# Patient Record
Sex: FEMALE | Race: OTHER | HISPANIC OR LATINO | Employment: FULL TIME | URBAN - METROPOLITAN AREA
[De-identification: names, ages, dates, MRNs, and addresses within clinical notes are randomized per-mention and may not be internally consistent; named-entity substitution may affect disease eponyms.]

---

## 2017-12-11 ENCOUNTER — ALLSCRIPTS OFFICE VISIT (OUTPATIENT)
Dept: OTHER | Facility: OTHER | Age: 32
End: 2017-12-11

## 2017-12-12 NOTE — CONSULTS
Assessment    1  GERD without esophagitis (530 81) (K21 9)   2  Gastritis (535 50) (K29 70)   3  Hematemesis (578 0) (K92 0)    Plan  Gastritis, GERD without esophagitis    · Follow Up After Tests Complete Evaluation and Treatment  Follow-up  Status: Hold For -Scheduling  Requested for: [id number]   Ordered; For: Gastritis, GERD without esophagitis; Ordered By: Max Mendez Performed:  Due: [id number]   · EGD; Status:Hold For - Scheduling; Requested for:[id number];    Perform:[facility]; Order Comments:west; SWR:[id number];LBYVCVF;SQFL without esophagitis; Ordered By:Laurie Pickard;    Discussion/Summary  Discussion Summary:   She appears to have worsening of her reflux symptoms  I will schedule her for an upper endoscopy to evaluate for reflux esophagitis, Santana's esophagus, and hiatal hernia  Given her history of gastritis in her possible history of bleeding also evaluate for peptic ulcer disease and Helicobacter pylori infection  I spoke to her about the benefits and risks of the procedure as well as instructions and answered all of her questions  I have asked her to restart the lansoprazole but to hold off on the sucralfate and metoclopramide for now  Chief Complaint  Chief Complaint Free Text Note Form: Patient is here for consult for GERD  History of Present Illness  HPI: She presents for evaluation because of gastroesophageal reflux disease  Her reflux symptoms began about 10 years ago and she had an upper endoscopy and was diagnosed with reflux and gastritis  Her symptoms had improved but then recently she had a worsening of her symptoms and was started on omeprazole  She developed headaches so she stopped taking the omeprazole  She was then started on lansoprazole, sucralfate, and metoclopramide  She felt this helped but then stopped taking the medications when her prescription ran out  She denies any dysphagia, change in bowel habits, bleeding, or weight loss   She complains of mild chronic constipation  She did have one episode of vomiting and noted some red blood in the emesis  History Reviewed: The history was obtained today from the patient and I agree with the documented history  Review of Systems  Complete-Female GI Adult:  Constitutional: No fever, no chills, feels well, no tiredness, no recent weight gain or weight loss  Eyes: No complaints of eye pain, no red eyes, no eyesight problems, no discharge, no dry eyes, no itching of eyes  ENT: no complaints of earache, no loss of hearing, no nose bleeds, no nasal discharge, no sore throat, no hoarseness  Cardiovascular: No complaints of slow heart rate, no fast heart rate, no chest pain, no palpitations, no leg claudication, no lower extremity edema  Respiratory: No complaints of shortness of breath, no wheezing, no cough, no SOB on exertion, no orthopnea, no PND  Gastrointestinal: GERD  Genitourinary: No complaints of dysuria, no incontinence, no pelvic pain, no dysmenorrhea, no vaginal discharge or bleeding  Musculoskeletal: No complaints of arthralgias, no myalgias, no joint swelling or stiffness, no limb pain or swelling  Integumentary: No complaints of skin rash or lesions, no itching, no skin wounds, no breast pain or lump  Neurological: No complaints of headache, no confusion, no convulsions, no numbness, no dizziness or fainting, no tingling, no limb weakness, no difficulty walking  Psychiatric: Not suicidal, no sleep disturbance, no anxiety or depression, no change in personality, no emotional problems  Endocrine: No complaints of proptosis, no hot flashes, no muscle weakness, no deepening of the voice, no feelings of weakness  Hematologic/Lymphatic: No complaints of swollen glands, no swollen glands in the neck, does not bleed easily, does not bruise easily  ROS Reviewed:   ROS reviewed  Past Medical History  Active Problems And Past Medical History Reviewed:    The active problems and past medical history were reviewed and updated today  Surgical History    1  History of Ankle Surgery   2  History of Tonsillectomy  Surgical History Reviewed: The surgical history was reviewed and updated today  Family History  Other    1  No pertinent family history  Family History Reviewed: The family history was reviewed and updated today  Social History  Social History Reviewed: The social history was reviewed and updated today  Current Meds   1  Allegra CAPS; Therapy: (Recorded:[id number]) to Recorded   2  Multiple Vitamin TABS; Therapy: (Recorded:90Adw6466) to Recorded  Medication List Reviewed: The medication list was reviewed and updated today  Allergies  1  No Known Drug Allergies    Vitals  Vital Signs    Recorded: 49QXM2373 10:11AM   Temperature 98 7 F, Tympanic   Heart Rate 88   Respiration 18   Systolic 194, LUE, Sitting   Diastolic 76, LUE, Sitting   Height 5 ft 7 in   Weight 200 lb    BMI Calculated 31 32   BSA Calculated 2 02       Physical Exam   Constitutional  General appearance: No acute distress, well appearing and well nourished  Eyes  Conjunctiva and lids: No swelling, erythema or discharge  Pupils and irises: Equal, round and reactive to light  Ears, Nose, Mouth, and Throat  External inspection of ears and nose: Normal    Nasal mucosa, septum, and turbinates: Normal without edema or erythema  Oropharynx: Normal with no erythema, edema, exudate or lesions  Pulmonary  Respiratory effort: No increased work of breathing or signs of respiratory distress  Auscultation of lungs: Clear to auscultation  Cardiovascular  Auscultation of heart: Normal rate and rhythm, normal S1 and S2, without murmurs  Examination of extremities for edema and/or varicosities: Normal    Abdomen  Abdomen: Abnormal  -- mild epigastric tenderness  Liver and spleen: No hepatomegaly or splenomegaly  Lymphatic  Palpation of lymph nodes in neck: No lymphadenopathy     Musculoskeletal  Gait and station: Normal    Digits and nails: Normal without clubbing or cyanosis  Inspection/palpation of joints, bones, and muscles: Normal    Skin  Skin and subcutaneous tissue: Normal without rashes or lesions     Psychiatric  Orientation to person, place, and time: Normal    Mood and affect: Normal          Signatures   Electronically signed by : Charo Posada MD; Dec 11 2017 10:48AM EST                       (Author)

## 2018-01-19 RX ORDER — DIPHENOXYLATE HYDROCHLORIDE AND ATROPINE SULFATE 2.5; .025 MG/1; MG/1
1 TABLET ORAL DAILY
COMMUNITY
End: 2020-08-24 | Stop reason: ALTCHOICE

## 2018-01-22 VITALS
RESPIRATION RATE: 18 BRPM | HEIGHT: 67 IN | TEMPERATURE: 98.7 F | SYSTOLIC BLOOD PRESSURE: 113 MMHG | DIASTOLIC BLOOD PRESSURE: 76 MMHG | WEIGHT: 200 LBS | HEART RATE: 88 BPM | BODY MASS INDEX: 31.39 KG/M2

## 2018-01-23 NOTE — CONSULTS
Chief Complaint  Patient is here for consult for GERD  History of Present Illness  She presents for evaluation because of gastroesophageal reflux disease  Her reflux symptoms began about 10 years ago and she had an upper endoscopy and was diagnosed with reflux and gastritis  Her symptoms had improved but then recently she had a worsening of her symptoms and was started on omeprazole  She developed headaches so she stopped taking the omeprazole  She was then started on lansoprazole, sucralfate, and metoclopramide  She felt this helped but then stopped taking the medications when her prescription ran out  She denies any dysphagia, change in bowel habits, bleeding, or weight loss  She complains of mild chronic constipation  She did have one episode of vomiting and noted some red blood in the emesis  The history was obtained today from the patient and I agree with the documented history  Review of Systems    Constitutional: No fever, no chills, feels well, no tiredness, no recent weight gain or weight loss  Eyes: No complaints of eye pain, no red eyes, no eyesight problems, no discharge, no dry eyes, no itching of eyes  ENT: no complaints of earache, no loss of hearing, no nose bleeds, no nasal discharge, no sore throat, no hoarseness  Cardiovascular: No complaints of slow heart rate, no fast heart rate, no chest pain, no palpitations, no leg claudication, no lower extremity edema  Respiratory: No complaints of shortness of breath, no wheezing, no cough, no SOB on exertion, no orthopnea, no PND  Gastrointestinal: GERD  Genitourinary: No complaints of dysuria, no incontinence, no pelvic pain, no dysmenorrhea, no vaginal discharge or bleeding  Musculoskeletal: No complaints of arthralgias, no myalgias, no joint swelling or stiffness, no limb pain or swelling  Integumentary: No complaints of skin rash or lesions, no itching, no skin wounds, no breast pain or lump     Neurological: No complaints of headache, no confusion, no convulsions, no numbness, no dizziness or fainting, no tingling, no limb weakness, no difficulty walking  Psychiatric: Not suicidal, no sleep disturbance, no anxiety or depression, no change in personality, no emotional problems  Endocrine: No complaints of proptosis, no hot flashes, no muscle weakness, no deepening of the voice, no feelings of weakness  Hematologic/Lymphatic: No complaints of swollen glands, no swollen glands in the neck, does not bleed easily, does not bruise easily  ROS reviewed  Past Medical History    The active problems and past medical history were reviewed and updated today  Surgical History    · History of Ankle Surgery   · History of Tonsillectomy    The surgical history was reviewed and updated today  Family History    · No pertinent family history    The family history was reviewed and updated today  Social History  The social history was reviewed and updated today  Current Meds   1  Allegra CAPS; Therapy: (Recorded:78Hka1863) to Recorded   2  Multiple Vitamin TABS; Therapy: (Recorded:54Sjk7530) to Recorded    The medication list was reviewed and updated today  Allergies    1  No Known Drug Allergies    Vitals   Recorded: 88GRZ9411 10:11AM   Temperature 98 7 F, Tympanic   Heart Rate 88   Respiration 18   Systolic 721, LUE, Sitting   Diastolic 76, LUE, Sitting   Height 5 ft 7 in   Weight 200 lb    BMI Calculated 31 32   BSA Calculated 2 02     Physical Exam    Constitutional   General appearance: No acute distress, well appearing and well nourished  Eyes   Conjunctiva and lids: No swelling, erythema or discharge  Pupils and irises: Equal, round and reactive to light  Ears, Nose, Mouth, and Throat   External inspection of ears and nose: Normal     Nasal mucosa, septum, and turbinates: Normal without edema or erythema  Oropharynx: Normal with no erythema, edema, exudate or lesions      Pulmonary Respiratory effort: No increased work of breathing or signs of respiratory distress  Auscultation of lungs: Clear to auscultation  Cardiovascular   Auscultation of heart: Normal rate and rhythm, normal S1 and S2, without murmurs  Examination of extremities for edema and/or varicosities: Normal     Abdomen   Abdomen: Abnormal   mild epigastric tenderness  Liver and spleen: No hepatomegaly or splenomegaly  Lymphatic   Palpation of lymph nodes in neck: No lymphadenopathy  Musculoskeletal   Gait and station: Normal     Digits and nails: Normal without clubbing or cyanosis  Inspection/palpation of joints, bones, and muscles: Normal     Skin   Skin and subcutaneous tissue: Normal without rashes or lesions  Psychiatric   Orientation to person, place, and time: Normal     Mood and affect: Normal          Assessment    1  GERD without esophagitis (530 81) (K21 9)   2  Gastritis (535 50) (K29 70)   3  Hematemesis (578 0) (K92 0)    Plan  Gastritis, GERD without esophagitis    · Follow Up After Tests Complete Evaluation and Treatment  Follow-up  Status: Hold For -  Scheduling  Requested for: [id number]   Ordered; For: Gastritis, GERD without esophagitis; Ordered By: Alex Ojeda Performed:  Due: 12UCW4983   · EGD; Status:Hold For - Scheduling; Requested for:04Hey1098;    Perform:[facility]; Order Comments:west; WOZ:[id number]; For:Gastritis, GERD without esophagitis; Ordered By:Gabby Pickard Seat;    Discussion/Summary    She appears to have worsening of her reflux symptoms  I will schedule her for an upper endoscopy to evaluate for reflux esophagitis, Santana's esophagus, and hiatal hernia  Given her history of gastritis in her possible history of bleeding also evaluate for peptic ulcer disease and Helicobacter pylori infection  I spoke to her about the benefits and risks of the procedure as well as instructions and answered all of her questions   I have asked her to restart the lansoprazole but to hold off on the sucralfate and metoclopramide for now        Signatures   Electronically signed by : Ruby Stern MD; Dec 11 2017 10:48AM EST                       (Author)

## 2018-01-24 ENCOUNTER — ANESTHESIA EVENT (OUTPATIENT)
Dept: GASTROENTEROLOGY | Facility: MEDICAL CENTER | Age: 33
End: 2018-01-24
Payer: COMMERCIAL

## 2018-01-29 ENCOUNTER — ANESTHESIA (OUTPATIENT)
Dept: GASTROENTEROLOGY | Facility: MEDICAL CENTER | Age: 33
End: 2018-01-29
Payer: COMMERCIAL

## 2018-01-29 ENCOUNTER — HOSPITAL ENCOUNTER (OUTPATIENT)
Facility: MEDICAL CENTER | Age: 33
Setting detail: OUTPATIENT SURGERY
Discharge: HOME | End: 2018-01-29
Attending: INTERNAL MEDICINE | Admitting: INTERNAL MEDICINE
Payer: COMMERCIAL

## 2018-01-29 VITALS
HEIGHT: 66 IN | OXYGEN SATURATION: 100 % | SYSTOLIC BLOOD PRESSURE: 100 MMHG | WEIGHT: 190 LBS | BODY MASS INDEX: 30.53 KG/M2 | HEART RATE: 64 BPM | DIASTOLIC BLOOD PRESSURE: 60 MMHG | TEMPERATURE: 98.6 F | RESPIRATION RATE: 16 BRPM

## 2018-01-29 DIAGNOSIS — K21.9 GASTRO-ESOPHAGEAL REFLUX DISEASE WITHOUT ESOPHAGITIS: ICD-10-CM

## 2018-01-29 LAB — EXT PREGNANCY TEST URINE: NEGATIVE

## 2018-01-29 PROCEDURE — 88305 TISSUE EXAM BY PATHOLOGIST: CPT | Performed by: INTERNAL MEDICINE

## 2018-01-29 PROCEDURE — 88342 IMHCHEM/IMCYTCHM 1ST ANTB: CPT | Performed by: PATHOLOGY

## 2018-01-29 PROCEDURE — 88313 SPECIAL STAINS GROUP 2: CPT | Performed by: INTERNAL MEDICINE

## 2018-01-29 PROCEDURE — 43239 EGD BIOPSY SINGLE/MULTIPLE: CPT | Performed by: INTERNAL MEDICINE

## 2018-01-29 PROCEDURE — 81025 URINE PREGNANCY TEST: CPT | Performed by: ANESTHESIOLOGY

## 2018-01-29 PROCEDURE — 88341 IMHCHEM/IMCYTCHM EA ADD ANTB: CPT | Performed by: INTERNAL MEDICINE

## 2018-01-29 PROCEDURE — 88313 SPECIAL STAINS GROUP 2: CPT | Performed by: PATHOLOGY

## 2018-01-29 PROCEDURE — 88342 IMHCHEM/IMCYTCHM 1ST ANTB: CPT | Performed by: INTERNAL MEDICINE

## 2018-01-29 PROCEDURE — 88305 TISSUE EXAM BY PATHOLOGIST: CPT | Performed by: PATHOLOGY

## 2018-01-29 RX ORDER — SODIUM CHLORIDE 9 MG/ML
125 INJECTION, SOLUTION INTRAVENOUS CONTINUOUS
Status: DISCONTINUED | OUTPATIENT
Start: 2018-01-29 | End: 2018-01-29 | Stop reason: HOSPADM

## 2018-01-29 RX ORDER — DEXTROAMPHETAMINE SACCHARATE, AMPHETAMINE ASPARTATE, DEXTROAMPHETAMINE SULFATE AND AMPHETAMINE SULFATE 2.5; 2.5; 2.5; 2.5 MG/1; MG/1; MG/1; MG/1
10 TABLET ORAL
COMMUNITY
End: 2020-08-24 | Stop reason: ALTCHOICE

## 2018-01-29 RX ADMIN — SODIUM CHLORIDE 125 ML/HR: 0.9 INJECTION, SOLUTION INTRAVENOUS at 12:00

## 2018-01-29 NOTE — OP NOTE
**** GI/ENDOSCOPY REPORT ****     PATIENT NAME: MARCY Deleon - VISIT ID:  Patient ID: [id number] YOB: 1985     INTRODUCTION: Esophagogastroduodenoscopy - A 29 female patient presents   for an outpatient Esophagogastroduodenoscopy at [address] [place] [address]  INDICATIONS: GERD  CONSENT: The benefits, risks, and alternatives to the procedure were   discussed and informed consent was obtained from the patient  PREPARATION:  EKG, pulse, pulse oximetry and blood pressure were monitored   throughout the procedure  ASA Classification: Class 2 - Patient has mild   to moderate systemic disturbance that may or may not be related to the   disorder requiring surgery  MEDICATIONS: Anesthesia-check records     PROCEDURE:  The endoscope was passed without difficulty through the mouth   under direct visualization and advanced to the 2nd portion of the   duodenum  The scope was withdrawn and the mucosa was carefully examined  Retroflexion was performed  FINDINGS:   Esophagus: The esophagus appeared to be normal    GE junction:   The GE junction appeared to be normal   Stomach: Erosive gastritis was   found in the stomach  Multiple biopsies was taken  Duodenum: The   duodenum appeared to be normal      COMPLICATIONS: There were no complications  IMPRESSIONS: Normal esophagus  Normal GE junction  Erosive gastritis   found  Multiple biopsies taken  Normal duodenum  RECOMMENDATIONS: Anti-reflux measures: Raise the head of the bed 4 to 6   inches  Avoid smoking  Avoid excess coffee, tea or other caffeinated   beverages  Avoid garments that fit tightly through the abdomen  Avoid   eating before bed  Continue current medications  Follow-up on the results   of the biopsy specimens  Follow-up appointment with endoscopist      ESTIMATED BLOOD LOSS:     PATHOLOGY SPECIMENS: Multiple biopsies taken  Associated finding:   Gastritis       PROCEDURE CODES:     ICD-9 Codes: 530 81 Esophageal reflux 535 40 Other specified gastritides,   without mention of hemorrhage     ICD-10 Codes: K21 Gastro-esophageal reflux disease K29 Gastritis and   duodenitis     PERFORMED BY: REINALDO Storm  on 01/29/2018  Version 1, electronically signed by REINALDO Cha  on 01/29/2018   at 13:06

## 2018-01-29 NOTE — DISCHARGE INSTRUCTIONS
Gastroesophageal Reflux Disease   WHAT YOU NEED TO KNOW:   What is gastroesophageal reflux disease? Gastroesophageal reflux occurs when acid and food in the stomach back up into the esophagus  Gastroesophageal reflux disease (GERD) is reflux that occurs more than twice a week for a few weeks  It usually causes heartburn and other symptoms  GERD can cause other health problems over time if it is not treated  What causes GERD? GERD often occurs when the lower muscle (sphincter) of the esophagus does not close properly  The sphincter normally opens to let food into the stomach  It then closes to keep food and stomach acid in the stomach  If the sphincter does not close properly, stomach acid and food back up (reflux) into the esophagus  The following may increase your risk for GERD:  · Certain foods such as spicy foods, chocolate, foods that contain caffeine, peppermint, and fried foods    · Hiatal hernia    · Certain medicines such as calcium channel blockers (used to treat high blood pressure), allergy medicines, sedatives, or antidepressants    · Pregnancy or obesity    · Lying down after a meal    · Drinking alcohol or smoking  What are the signs and symptoms of GERD? Heartburn is the most common symptom of GERD  You may feel burning pain in your chest or below the breast bone  This usually occurs after meals and spreads to your neck, jaw, or shoulder  The pain gets better when you change positions  You may also have any of the following:  · Bitter or acid taste in your mouth    · Dry cough    · Trouble swallowing or pain with swallowing    · Hoarseness or sore throat    · Frequent burping or hiccups    · Feeling of fullness soon after you start eating  How is GERD diagnosed? Your healthcare provider will ask about your symptoms and when they started  Tell him or her about other medical conditions you have, your eating habits, and your activities   You may also need any of the following:  · Esophageal pH monitoring  is used to place a small probe inside your esophagus and stomach to check the amount of acid  · An endoscopy  is a procedure used to look at the inside of your esophagus and stomach  An endoscope is a bendable tube with a light and camera on the end  Your healthcare provider may remove a small sample of tissue and send it to a lab for tests  · Upper GI x-rays  are done to take pictures of your stomach and intestines (bowel)  You may be given a chalky liquid to drink before the pictures are taken  This liquid helps your stomach and intestines show up better on the x-rays  · Esophageal manometry  is a test that shows how your esophagus pushes food and fluid to your stomach  It also shows the pressures in your esophagus and stomach  It may show a hiatal hernia  How is GERD treated? · Medicines  are used to decrease stomach acid  Medicine may also be used to help your lower esophageal sphincter and stomach contract (tighten) more  · Surgery  is done to wrap the upper part of the stomach around the esophageal sphincter  This will strengthen the sphincter and prevent reflux  How can I help manage GERD? · Do not have foods or drinks that may increase heartburn  These include chocolate, peppermint, fried or fatty foods, drinks that contain caffeine, or carbonated drinks (soda)  Other foods include spicy foods, onions, tomatoes, and tomato-based foods  Do not have foods or drinks that can irritate your esophagus, such as citrus fruits, juices, and alcohol  · Do not eat large meals  When you eat a lot of food at one time, your stomach needs more acid to digest it  Eat 6 small meals each day instead of 3 large ones, and eat slowly  Do not eat meals 2 to 3 hours before bedtime  · Elevate the head of your bed  Place 6-inch blocks under the head of your bed frame  You may also use more than one pillow under your head and shoulders while you sleep  · Maintain a healthy weight    If you are overweight, weight loss may help relieve symptoms of GERD  · Do not smoke  Smoking weakens the lower esophageal sphincter and increases the risk of GERD  Ask your healthcare provider for information if you currently smoke and need help to quit  E-cigarettes or smokeless tobacco still contain nicotine  Talk to your healthcare provider before you use these products  · Do not wear clothing that is tight around your waist   Tight clothing can put pressure on your stomach and cause or worsen GERD symptoms  When should I seek immediate care? · You feel full and cannot burp or vomit  · You have severe chest pain and sudden trouble breathing  · Your bowel movements are black, bloody, or tarry-looking  · Your vomit looks like coffee grounds or has blood in it  When should I contact my healthcare provider? · You vomit large amounts, or you vomit often  · You have trouble breathing after you vomit  · You have trouble swallowing, or pain with swallowing  · You are losing weight without trying  · Your symptoms get worse or do not improve with treatment  · You have questions or concerns about your condition or care  CARE AGREEMENT:   You have the right to help plan your care  Learn about your health condition and how it may be treated  Discuss treatment options with your caregivers to decide what care you want to receive  You always have the right to refuse treatment  The above information is an  only  It is not intended as medical advice for individual conditions or treatments  Talk to your doctor, nurse or pharmacist before following any medical regimen to see if it is safe and effective for you  © 2017 [address] Information is for End User's use only and may not be sold, redistributed or otherwise used for commercial purposes   All illustrations and images included in CareNotes® are the copyrighted property of [employer] M [employer]  or [employer]  Upper Endoscopy   WHAT YOU NEED TO KNOW:   An upper endoscopy is also called an upper gastrointestinal (GI) endoscopy, or an esophagogastroduodenoscopy (EGD)  You may feel bloated, gassy, or have some abdominal discomfort after your procedure  Your throat may be sore for 24 to 36 hours  You may burp or pass gas from air that is still inside your body  DISCHARGE INSTRUCTIONS:   Call 911 for any of the following:   · You have sudden chest pain or trouble breathing  Seek care immediately if:   · You feel dizzy or faint  · You have trouble swallowing  · Your bowel movements are very dark or black  · Your abdomen is hard and firm and you have severe pain  · You vomit blood  Contact your healthcare provider if:   · You feel full or bloated and cannot burp or pass gas  · You have not had a bowel movement for 3 days after your procedure  · You have neck pain  · You have a fever or chills  · You have nausea or are vomiting  · You have a rash or hives  · You have questions or concerns about your endoscopy  Relieve a sore throat:  Suck on throat lozenges or crushed ice  Gargle with a small amount of warm salt water  Mix 1 teaspoon of salt and 1 cup of warm water to make salt water  Relieve gas and discomfort from bloating:  Lie on your right side with a heating pad on your abdomen  Take short walks to help pass gas  Eat small meals until bloating is relieved  Rest after your procedure: You have been given medicine to relax you  Do not  drive or make important decisions until the day after your procedure  Return to your normal activity as directed  You can usually return to work the day after your procedure  Follow up with your healthcare provider as directed:  Write down your questions so you remember to ask them during your visits     © 2017 [address] is for End User's use only and may not be sold, redistributed or otherwise used for commercial purposes  All illustrations and images included in CareNotes® are the copyrighted property of [employer]  or [employer]  The above information is an  only  It is not intended as medical advice for individual conditions or treatments  Talk to your doctor, nurse or pharmacist before following any medical regimen to see if it is safe and effective for you  Helicobacter Pylori   WHAT YOU NEED TO KNOW:   What is Helicobacter pylori (H  pylori)? H  pylori is a type of bacteria that causes an infection in the lining of the stomach and upper intestine  People are usually infected with the bacteria as children, but may not have symptoms until they are adults  What increases my risk for an H  pylori infection? Experts do not know exactly how H  pylori spread  The following may increase your risk for an infection:  · You eat food that is not washed well or cooked properly  · You drink water that is not clean  · You have contact with bowel movement, vomit, or saliva that is infected with H  pylori  What are the signs and symptoms of an H  pylori infection? Most people who are infected with H  pylori never have symptoms  If you do, your symptoms may come and go and last for minutes or hours  You may feel better for a short time after you eat food or take medicine  You may have any of the following:  · Dull or burning pain in your stomach or throat    · Nausea, vomiting, bloating, or burping    · Loss of appetite or weight loss    · Pain at night or with an empty stomach  How is an H  pylori infection diagnosed? · A urea breath test  may be used to test for H  pylori  You will swallow pudding, liquid, or a capsule that contains a chemical  Then you will breathe into a container  Your breath sample will be tested for a reaction to the chemical that confirms H  pylori infection  · A bowel movement sample  may be sent to a lab to test for infection      · Blood tests may be used to test for infection  · Endoscopy  is a procedure that uses a scope to see the inside of your stomach  A scope is a soft, flexible tube with a light and tiny camera on the end  It is passed down your throat and into your stomach  Samples of your stomach tissue may be removed and sent to a lab to test for H  pylori infection  This test will be done if your healthcare provider thinks you may have an ulcer  How is an H  pylori infection treated? · Antibiotics  help kill the bacteria  You may need to take this medicine for 10 to 14 days  Your healthcare provider will prescribe at least 2 antibiotics at the same time  · Antiulcer medicines  help decrease the amount of acid that is normally made by the stomach  These help relieve pain and heal or prevent ulcers  · Bismuth  is a liquid or tablet that may be used to decrease heartburn, upset stomach, or diarrhea  It may also decrease swelling in your stomach and help kill the infection if other medicines do not work  It also protects ulcers from stomach acid so they can heal   What are the risks of an H  pylori infection? You may need more than one course of treatment to kill the H  pylori infection  Your symptoms may return after treatment  You are at risk for a peptic ulcer in the lining of your stomach and intestines if you do not receive treatment for your infection  H  pylori may also cause swelling in your stomach  The infection may lead to stomach cancer and lymphoma (cancer of the lymph nodes) if it is not treated  How can I help prevent an H  pylori infection? · Wash your hands well with soap and warm water  Clean your hands before you eat and after you use the bathroom  · Handle food properly  Rinse food well before you cook or eat it  [place] food all the way through  Proper handling will help kill any bacteria that may be on your food  · Drink clean water from a safe source    Only drink water that has been filtered or purified  · Ask about NSAIDs  NSAIDs can cause stomach bleeding and kidney problems if not taken correctly  Your healthcare provider may tell you to avoid these medicines because they can make your symptoms worse  · Do not smoke  Nicotine and other chemicals in cigarettes and cigars can worsen your symptoms and also cause lung damage  Ask your healthcare provider for information if you currently smoke and need help to quit  E-cigarettes or smokeless tobacco still contain nicotine  Talk to your healthcare provider before you use these products  · Do not drink alcohol  Alcohol may worsen your symptoms of heartburn  When should I seek immediate care? · You have bloody bowel movements, bloody vomit, or vomit that looks like coffee grounds  · You have sudden, sharp stomach pain that does not go away or spreads to your back  When should I contact my healthcare provider? · Your symptoms do not improve with treatment  · You feel full after eating only a small amount of food  · You lose weight without trying  · You have questions or concerns about your condition or care  CARE AGREEMENT:   You have the right to help plan your care  Learn about your health condition and how it may be treated  Discuss treatment options with your caregivers to decide what care you want to receive  You always have the right to refuse treatment  The above information is an  only  It is not intended as medical advice for individual conditions or treatments  Talk to your doctor, nurse or pharmacist before following any medical regimen to see if it is safe and effective for you  © 2017 [address] Information is for End User's use only and may not be sold, redistributed or otherwise used for commercial purposes  All illustrations and images included in CareNotes® are the copyrighted property of [employer]  or [employer] Garcia

## 2018-01-29 NOTE — ANESTHESIA PREPROCEDURE EVALUATION
Review of Systems/Medical History  Patient summary reviewed  Chart reviewed      Cardiovascular  Negative cardio ROS Exercise tolerance: good,     Pulmonary  Negative pulmonary ROS        GI/Hepatic  Dysphagia,          Negative  ROS        Endo/Other  Negative endo/other ROS      GYN  Negative gynecology ROS          Hematology  Negative hematology ROS      Musculoskeletal  Negative musculoskeletal ROS        Neurology  Negative neurology ROS      Psychology   Negative psychology ROS              Physical Exam    Airway    Mallampati score: I  TM Distance: >3 FB  Neck ROM: full     Dental       Cardiovascular  Comment: Negative ROS, Cardiovascular exam normal    Pulmonary  Pulmonary exam normal     Other Findings        Anesthesia Plan  ASA Score- 1     Anesthesia Type- IV sedation with anesthesia with ASA Monitors  Additional Monitors:   Airway Plan:         Plan Factors- Patient instructed to abstain from smoking on day of procedure  Patient did not smoke on day of surgery  Induction- intravenous  Postoperative Plan-     Informed Consent- Anesthetic plan and risks discussed with patient

## 2018-02-03 DIAGNOSIS — A04.8 HELICOBACTER PYLORI (H. PYLORI) INFECTION: Primary | ICD-10-CM

## 2018-02-03 RX ORDER — AMOXICILLIN 500 MG/1
1000 CAPSULE ORAL EVERY 12 HOURS SCHEDULED
Qty: 56 CAPSULE | Refills: 0 | Status: SHIPPED | OUTPATIENT
Start: 2018-02-03 | End: 2018-02-17

## 2018-02-03 RX ORDER — LANSOPRAZOLE 30 MG/1
30 CAPSULE, DELAYED RELEASE ORAL
Qty: 28 CAPSULE | Refills: 0 | Status: SHIPPED | OUTPATIENT
Start: 2018-02-03 | End: 2018-04-10

## 2018-02-03 RX ORDER — CLARITHROMYCIN 500 MG/1
500 TABLET, COATED ORAL EVERY 12 HOURS SCHEDULED
Qty: 28 TABLET | Refills: 0 | Status: SHIPPED | OUTPATIENT
Start: 2018-02-03 | End: 2018-02-17

## 2018-02-03 RX ORDER — LANSOPRAZOLE, AMOXICILLIN, CLARITHROMYCIN 30-500-500
KIT ORAL 2 TIMES DAILY
Qty: 1 KIT | Refills: 0 | Status: SHIPPED | OUTPATIENT
Start: 2018-02-03 | End: 2018-02-03

## 2018-02-16 ENCOUNTER — TELEPHONE (OUTPATIENT)
Dept: GASTROENTEROLOGY | Facility: MEDICAL CENTER | Age: 33
End: 2018-02-16

## 2018-02-16 DIAGNOSIS — A04.8 HELICOBACTER PYLORI (H. PYLORI) INFECTION: ICD-10-CM

## 2018-02-16 RX ORDER — LANSOPRAZOLE 30 MG/1
30 CAPSULE, DELAYED RELEASE ORAL
Qty: 28 CAPSULE | Refills: 0 | Status: CANCELLED | OUTPATIENT
Start: 2018-02-16 | End: 2018-03-02

## 2018-02-16 RX ORDER — LANSOPRAZOLE 30 MG/1
30 CAPSULE, DELAYED RELEASE ORAL
COMMUNITY
End: 2018-02-16

## 2018-02-16 NOTE — TELEPHONE ENCOUNTER
Dr Deana Van pt called stating she will finishing her antibiotics tomorrown and also her Lansoprazole  Pt states she is still having the same symptoms  Pt is sched for office visit with PA on 02/28/18 and wanted to know if she can get a refill on her Lansoprazole   Pt can

## 2018-04-10 ENCOUNTER — OFFICE VISIT (OUTPATIENT)
Dept: GASTROENTEROLOGY | Facility: MEDICAL CENTER | Age: 33
End: 2018-04-10
Payer: COMMERCIAL

## 2018-04-10 VITALS
HEART RATE: 89 BPM | WEIGHT: 190 LBS | TEMPERATURE: 96.8 F | BODY MASS INDEX: 30.67 KG/M2 | SYSTOLIC BLOOD PRESSURE: 108 MMHG | DIASTOLIC BLOOD PRESSURE: 72 MMHG

## 2018-04-10 DIAGNOSIS — K29.70 HELICOBACTER POSITIVE GASTRITIS: Primary | ICD-10-CM

## 2018-04-10 DIAGNOSIS — B96.81 HELICOBACTER POSITIVE GASTRITIS: Primary | ICD-10-CM

## 2018-04-10 DIAGNOSIS — K21.9 GERD WITHOUT ESOPHAGITIS: ICD-10-CM

## 2018-04-10 PROCEDURE — 99214 OFFICE O/P EST MOD 30 MIN: CPT | Performed by: INTERNAL MEDICINE

## 2018-04-10 RX ORDER — OMEPRAZOLE 40 MG/1
CAPSULE, DELAYED RELEASE ORAL
Refills: 11 | COMMUNITY
Start: 2018-03-28 | End: 2018-04-10 | Stop reason: ALTCHOICE

## 2018-04-10 RX ORDER — LANSOPRAZOLE 30 MG/1
30 CAPSULE, DELAYED RELEASE ORAL
Qty: 30 CAPSULE | Refills: 5 | Status: SHIPPED | OUTPATIENT
Start: 2018-04-10 | End: 2018-12-19 | Stop reason: SDUPTHER

## 2018-04-10 NOTE — LETTER
April 10, 2018     Patient: Светлана Stephenson   YOB: 1985   Date of Visit: 4/10/2018       To Whom it May Concern:    Светлана Stpehenson is under my professional care  She was seen in my office on 4/10/2018  She may return to work on 4/11/2018  If you have any questions or concerns, please don't hesitate to call           Sincerely,          Shahla Kincaid MD        CC: No Recipients

## 2018-04-10 NOTE — LETTER
April 10, 2018     Kourtney Dyer MD  [address]  [address] [place] [address]    Patient: Charles Lindo   YOB: 1985   Date of Visit: 4/10/2018       Dear Dr Tamika Green: Thank you for referring Charles Lindo to me for evaluation  Below are my notes for this consultation  If you have questions, please do not hesitate to call me  I look forward to following your patient along with you  Sincerely,        Liane Parikh MD        CC: No Recipients  Liane Parikh MD  4/10/2018  2:24 PM  Sign at close encounter  [facility] - Outpatient Follow-up Note  Charles Lindo 29 y o  female MRN: [id number]  Encounter: [id number]          ASSESSMENT AND PLAN:      1  Helicobacter positive gastritis  I will check her Helicobacter pylori stool antigen to make sure her infection has been eradicated  I asked her to hold the lansoprazole for one week prior to the stool test   - lansoprazole (PREVACID) 30 mg capsule; Take 1 capsule (30 mg total) by mouth daily before breakfast for 30 days  Dispense: 30 capsule; Refill: 5  - H  pylori antigen, stool; Future    2  GERD without esophagitis  I have asked her to resume lansoprazole daily as the omeprazole is causing headaches  I also spoke to her about dietary and lifestyle modifications that can help her symptoms  She should call if she develops any alarm symptoms   - lansoprazole (PREVACID) 30 mg capsule; Take 1 capsule (30 mg total) by mouth daily before breakfast for 30 days  Dispense: 30 capsule; Refill: 5    ______________________________________________________________________    SUBJECTIVE:  She presents for follow-up after her recent upper endoscopy because of reflux and epigastric pain  She was diagnosed with gastritis and her biopsies were positive for Helicobacter pylori infection  I started her on triple therapy and she completed a two week course    She recently started taking omeprazole again but feels it is giving her headaches  She denies any dysphagia, and feels her reflux is well controlled with a proton pump inhibitor  REVIEW OF SYSTEMS IS OTHERWISE NEGATIVE  Historical Information   No past medical history on file  Past Surgical History:   Procedure Laterality Date    ANKLE SURGERY      MOUTH SURGERY      [place] ESOPHAGOGASTRODUODENOSCOPY TRANSORAL DIAGNOSTIC N/A 1/29/2018    Procedure: ESOPHAGOGASTRODUODENOSCOPY (EGD); Surgeon: Lupe Keane MD;  Location: [facility]; Service: Gastroenterology    TONSILLECTOMY       Social History   History   Alcohol Use    Yes     History   Drug Use No     History   Smoking Status    Never Smoker   Smokeless Tobacco    Former User     No family history on file  Meds/Allergies       Current Outpatient Prescriptions:     amphetamine-dextroamphetamine (ADDERALL) 10 mg tablet    Fexofenadine HCl (ALLEGRA PO)    multivitamin (THERAGRAN) TABS    lansoprazole (PREVACID) 30 mg capsule    No Known Allergies        Objective     Blood pressure 108/72, pulse 89, temperature (!) 96 8 °F (36 °C), temperature source Tympanic, weight 86 2 kg (190 lb), unknown if currently breastfeeding  Body mass index is 30 67 kg/m²  PHYSICAL EXAM:      General Appearance:   Alert, cooperative, no distress   HEENT:   Normocephalic, atraumatic, anicteric      Neck:  Supple, symmetrical, trachea midline   Lungs:   Clear to auscultation bilaterally; no rales, rhonchi or wheezing; respirations unlabored    Heart[de-identified]   Regular rate and rhythm; no murmur, rub, or gallop  Abdomen:   Soft, mild tenderness, non-distended; normal bowel sounds; no masses, no organomegaly    Genitalia:   Deferred    Rectal:   Deferred    Extremities:  No cyanosis, clubbing or edema    Pulses:  2+ and symmetric    Skin:  No jaundice, rashes, or lesions    Lymph nodes:  No palpable cervical lymphadenopathy        Lab Results:   No visits with results within 1 Day(s) from this visit     Latest known visit with results is:   Admission on 01/29/2018, Discharged on 01/29/2018   Component Date Value    EXT Preg Test, Ur 01/29/2018 Negative     Case Report 01/29/2018                      Value:Surgical Pathology Report                         Case: [id number]                                   Authorizing Provider:  Shawn Gimenez MD           Collected:           01/29/2018 1254              Ordering Location:     [facility]        Received:            01/29/2018 [facility] Endoscopy                                                     Pathologist:           Paul Navarrete MD                                                              Specimen:    Stomach, erosive gastritis, r/o h pylori                                                   Addendum 01/29/2018                      Value: This result contains rich text formatting which cannot be displayed here   Final Diagnosis 01/29/2018                      Value: This result contains rich text formatting which cannot be displayed here   Note 01/29/2018                      Value: This result contains rich text formatting which cannot be displayed here   Additional Information 01/29/2018                      Value: This result contains rich text formatting which cannot be displayed here  Faye Medrano Gross Description 01/29/2018                      Value: This result contains rich text formatting which cannot be displayed here  Radiology Results:   No results found

## 2018-04-10 NOTE — PROGRESS NOTES
[facility] - Outpatient Follow-up Note  Marielos Mccauley 29 y o  female MRN: [id number]  Encounter: [id number]          ASSESSMENT AND PLAN:      1  Helicobacter positive gastritis  I will check her Helicobacter pylori stool antigen to make sure her infection has been eradicated  I asked her to hold the lansoprazole for one week prior to the stool test   - lansoprazole (PREVACID) 30 mg capsule; Take 1 capsule (30 mg total) by mouth daily before breakfast for 30 days  Dispense: 30 capsule; Refill: 5  - H  pylori antigen, stool; Future    2  GERD without esophagitis  I have asked her to resume lansoprazole daily as the omeprazole is causing headaches  I also spoke to her about dietary and lifestyle modifications that can help her symptoms  She should call if she develops any alarm symptoms   - lansoprazole (PREVACID) 30 mg capsule; Take 1 capsule (30 mg total) by mouth daily before breakfast for 30 days  Dispense: 30 capsule; Refill: 5    ______________________________________________________________________    SUBJECTIVE:  She presents for follow-up after her recent upper endoscopy because of reflux and epigastric pain  She was diagnosed with gastritis and her biopsies were positive for Helicobacter pylori infection  I started her on triple therapy and she completed a two week course  She recently started taking omeprazole again but feels it is giving her headaches  She denies any dysphagia, and feels her reflux is well controlled with a proton pump inhibitor  REVIEW OF SYSTEMS IS OTHERWISE NEGATIVE  Historical Information   No past medical history on file  Past Surgical History:   Procedure Laterality Date    ANKLE SURGERY      MOUTH SURGERY      AR ESOPHAGOGASTRODUODENOSCOPY TRANSORAL DIAGNOSTIC N/A 1/29/2018    Procedure: ESOPHAGOGASTRODUODENOSCOPY (EGD); Surgeon: Daniela Zarate MD;  Location: [facility];   Service: Gastroenterology    TONSILLECTOMY       Social History   History   Alcohol Use    Yes     History   Drug Use No     History   Smoking Status    Never Smoker   Smokeless Tobacco    Former User     No family history on file  Meds/Allergies       Current Outpatient Prescriptions:     amphetamine-dextroamphetamine (ADDERALL) 10 mg tablet    Fexofenadine HCl (ALLEGRA PO)    multivitamin (THERAGRAN) TABS    lansoprazole (PREVACID) 30 mg capsule    No Known Allergies        Objective     Blood pressure 108/72, pulse 89, temperature (!) 96 8 °F (36 °C), temperature source Tympanic, weight 86 2 kg (190 lb), unknown if currently breastfeeding  Body mass index is 30 67 kg/m²  PHYSICAL EXAM:      General Appearance:   Alert, cooperative, no distress   HEENT:   Normocephalic, atraumatic, anicteric      Neck:  Supple, symmetrical, trachea midline   Lungs:   Clear to auscultation bilaterally; no rales, rhonchi or wheezing; respirations unlabored    Heart[de-identified]   Regular rate and rhythm; no murmur, rub, or gallop  Abdomen:   Soft, mild tenderness, non-distended; normal bowel sounds; no masses, no organomegaly    Genitalia:   Deferred    Rectal:   Deferred    Extremities:  No cyanosis, clubbing or edema    Pulses:  2+ and symmetric    Skin:  No jaundice, rashes, or lesions    Lymph nodes:  No palpable cervical lymphadenopathy        Lab Results:   No visits with results within 1 Day(s) from this visit     Latest known visit with results is:   Admission on 01/29/2018, Discharged on 01/29/2018   Component Date Value    EXT Preg Test, Ur 01/29/2018 Negative     Case Report 01/29/2018                      Value:Surgical Pathology Report                         Case: [id number]                                   Authorizing Provider:  Ankit Draper MD           Collected:           01/29/2018 1254              Ordering Location:     [address] [place] [address] End        Received:            01/29/2018 [facility] Pathologist:           Sheryl Hooper MD                                                              Specimen:    Stomach, erosive gastritis, r/o h pylori                                                   Addendum 01/29/2018                      Value: This result contains rich text formatting which cannot be displayed here   Final Diagnosis 01/29/2018                      Value: This result contains rich text formatting which cannot be displayed here   Note 01/29/2018                      Value: This result contains rich text formatting which cannot be displayed here   Additional Information 01/29/2018                      Value: This result contains rich text formatting which cannot be displayed here  Randall Ken Gross Description 01/29/2018                      Value: This result contains rich text formatting which cannot be displayed here  Radiology Results:   No results found

## 2018-04-20 ENCOUNTER — LAB (OUTPATIENT)
Dept: LAB | Facility: MEDICAL CENTER | Age: 33
End: 2018-04-20
Attending: INTERNAL MEDICINE
Payer: COMMERCIAL

## 2018-04-20 DIAGNOSIS — K29.70 HELICOBACTER POSITIVE GASTRITIS: ICD-10-CM

## 2018-04-20 DIAGNOSIS — B96.81 HELICOBACTER POSITIVE GASTRITIS: ICD-10-CM

## 2018-04-20 PROCEDURE — 87338 HPYLORI STOOL AG IA: CPT

## 2018-04-21 LAB — H PYLORI AG STL QL IA: NEGATIVE

## 2018-07-13 ENCOUNTER — OFFICE VISIT (OUTPATIENT)
Dept: OBGYN CLINIC | Facility: MEDICAL CENTER | Age: 33
End: 2018-07-13
Payer: COMMERCIAL

## 2018-07-13 VITALS
WEIGHT: 186.8 LBS | HEIGHT: 66 IN | DIASTOLIC BLOOD PRESSURE: 68 MMHG | SYSTOLIC BLOOD PRESSURE: 102 MMHG | BODY MASS INDEX: 30.02 KG/M2

## 2018-07-13 DIAGNOSIS — Z11.51 SCREENING FOR HPV (HUMAN PAPILLOMAVIRUS): ICD-10-CM

## 2018-07-13 DIAGNOSIS — Z01.419 ENCOUNTER FOR WELL WOMAN EXAM WITH ROUTINE GYNECOLOGICAL EXAM: ICD-10-CM

## 2018-07-13 DIAGNOSIS — Z01.419 ENCOUNTER FOR ROUTINE GYNECOLOGICAL EXAMINATION WITH PAPANICOLAOU SMEAR OF CERVIX: Primary | ICD-10-CM

## 2018-07-13 DIAGNOSIS — N93.9 ABNORMAL UTERINE BLEEDING (AUB): ICD-10-CM

## 2018-07-13 PROBLEM — Z97.5 IUD (INTRAUTERINE DEVICE) IN PLACE: Status: ACTIVE | Noted: 2018-07-13

## 2018-07-13 PROCEDURE — 87624 HPV HI-RISK TYP POOLED RSLT: CPT | Performed by: OBSTETRICS & GYNECOLOGY

## 2018-07-13 PROCEDURE — S0610 ANNUAL GYNECOLOGICAL EXAMINA: HCPCS | Performed by: OBSTETRICS & GYNECOLOGY

## 2018-07-13 PROCEDURE — G0145 SCR C/V CYTO,THINLAYER,RESCR: HCPCS | Performed by: OBSTETRICS & GYNECOLOGY

## 2018-07-13 NOTE — PROGRESS NOTES
ASSESSMENT & PLAN: Sallie Paula is a 29 y o  Teresa Matute with normal gynecologic exam     1   Routine well woman exam done today  2  Pap and HPV:  The patient's last pap and hpv was   It was normal in [facility]  Pap and cotesting was done today  Current ASCCP Guidelines reviewed  3   The following were reviewed in today's visit: breast self exam and mammography screening ordered  4  AUB, Mirena in place; states was told she has Fibroid in [facility] - check ultrasound     CC:  Annual Gynecologic Examination    HPI: Sallie Paula is a 29 y o  Teresa Matute who presents for annual gynecologic examination  She has the following concerns:  States had heavy bleeding after intercourse; has been using Mirena for 7 years; has not had intercourse since episode, was told she has Fibroid in [facility]  Health Maintenance:    She wears her seatbelt routinely  She does not perform regular monthly self breast exams  She feels safe at home  Past Medical History:   Diagnosis Date    Gastritis        Past Surgical History:   Procedure Laterality Date    ANKLE SURGERY      MOUTH SURGERY      [place] ESOPHAGOGASTRODUODENOSCOPY TRANSORAL DIAGNOSTIC N/A 2018    Procedure: ESOPHAGOGASTRODUODENOSCOPY (EGD); Surgeon: Shawn Gimenez MD;  Location: [facility] GI [facility]; Service: Gastroenterology    TONSILLECTOMY         Past OB/Gyn History:  OB History      Para Term  AB Living    0 0 0 0 0 0    SAB TAB Ectopic Multiple Live Births    0 0 0 0 0        Pt does not have menstrual issues  History of sexually transmitted infection: No   History of abnormal pap smears: No prior HPV +, states had laser  Patient is currently sexually active  heterosexual   The current method of family planning is IUD      Family History   Problem Relation Age of Onset    Hyperlipidemia Father     Hyperlipidemia Brother        Social History:  Social History     Social History    Marital status: /Civil Union     Spouse name: N/A  Number of children: N/A    Years of education: N/A     Occupational History    Not on file  Social History Main Topics    Smoking status: Never Smoker    Smokeless tobacco: Never Used    Alcohol use Yes      Comment: social    Drug use: No    Sexual activity: Yes     Partners: Male     Birth control/ protection: IUD     Other Topics Concern    Not on file     Social History Narrative    No narrative on file       No Known Allergies      Current Outpatient Prescriptions:     amphetamine-dextroamphetamine (ADDERALL) 10 mg tablet, Take 10 mg by mouth 2 (two) times a day, Disp: , Rfl:     Fexofenadine HCl (ALLEGRA PO), Take by mouth, Disp: , Rfl:     levonorgestrel (MIRENA) 20 MCG/24HR IUD, , Disp: , Rfl:     multivitamin (THERAGRAN) TABS, Take 1 tablet by mouth daily, Disp: , Rfl:     lansoprazole (PREVACID) 30 mg capsule, Take 1 capsule (30 mg total) by mouth daily before breakfast for 30 days, Disp: 30 capsule, Rfl: 5      Review of Systems  Constitutional :no fever, feels well, no tiredness, no recent weight gain or loss  ENT: no ear ache, no loss of hearing, no nosebleeds or nasal discharge, no sore throat or hoarseness  Cardiovascular: no complaints of slow or fast heart beat, no chest pain, no palpitations, no leg claudication or lower extremity edema  Respiratory: no complaints of shortness of shortness of breath, no MORTENSEN  Breasts:no complaints of breast pain, breast lump, or nipple discharge  Gastrointestinal: no complaints of abdominal pain, constipation, nausea, vomiting, or diarrhea or bloody stools  Genitourinary : no complaints of dysuria, incontinence, pelvic pain, no dysmenorrhea, vaginal discharge or abnormal vaginal bleeding and as noted in HPI  Musculoskeletal: no complaints of arthralgia, no myalgia, no joint swelling or stiffness, no limb pain or swelling    Integumentary: no complaints of skin rash or lesion, itching or dry skin  Neurological: no complaints of headache, no confusion, no numbness or tingling, no dizziness or fainting    Objective      /68 (BP Location: Left arm, Patient Position: Sitting, Cuff Size: Adult)   Ht 5' 5 75" (1 67 m)   Wt 84 7 kg (186 lb 12 8 oz)   BMI 30 38 kg/m²   General:   appears stated age, cooperative, alert normal mood and affect   Neck: normal, supple,trachea midline, no masses   Heart: regular rate and rhythm, S1, S2 normal, no murmur, click, rub or gallop   Lungs: clear to auscultation bilaterally   Breasts: normal appearance, no masses or tenderness, Inspection negative, No nipple retraction or dimpling, No nipple discharge or bleeding, No axillary or supraclavicular adenopathy   Abdomen: soft, non-tender, without masses or organomegaly   Vulva: normal female genitalia, Bartholin's, Urethra, [place] normal, no lesions   Vagina: normal vagina, no discharge, exudate, lesion, or erythema   Urethra: normal   Cervix: Normal, no discharge  PAP done  IUD strings present  Uterus: normal size, contour, position, consistency, mobility, non-tender   Adnexa: no mass, fullness, tenderness   Lymphatic palpation of lymph nodes in neck, axilla, groin and/or other locations: no lymphadenopathy or masses noted   Skin normal skin turgor and no rashes     Psychiatric orientation to person, place, and time: normal  mood and affect: normal

## 2018-07-17 LAB — HPV RRNA GENITAL QL NAA+PROBE: NORMAL

## 2018-07-20 LAB
LAB AP GYN PRIMARY INTERPRETATION: NORMAL
Lab: NORMAL

## 2018-09-04 ENCOUNTER — HOSPITAL ENCOUNTER (OUTPATIENT)
Dept: ULTRASOUND IMAGING | Facility: MEDICAL CENTER | Age: 33
Discharge: HOME | End: 2018-09-04
Payer: COMMERCIAL

## 2018-09-04 DIAGNOSIS — N93.9 ABNORMAL UTERINE BLEEDING (AUB): ICD-10-CM

## 2018-09-04 PROCEDURE — 76856 US EXAM PELVIC COMPLETE: CPT

## 2018-09-04 PROCEDURE — 76830 TRANSVAGINAL US NON-OB: CPT

## 2018-09-10 ENCOUNTER — TELEPHONE (OUTPATIENT)
Dept: OBGYN CLINIC | Facility: MEDICAL CENTER | Age: 33
End: 2018-09-10

## 2018-10-04 ENCOUNTER — OFFICE VISIT (OUTPATIENT)
Dept: OBGYN CLINIC | Facility: MEDICAL CENTER | Age: 33
End: 2018-10-04
Payer: COMMERCIAL

## 2018-10-04 VITALS — WEIGHT: 182 LBS | DIASTOLIC BLOOD PRESSURE: 70 MMHG | BODY MASS INDEX: 29.6 KG/M2 | SYSTOLIC BLOOD PRESSURE: 110 MMHG

## 2018-10-04 DIAGNOSIS — Z97.5 BREAKTHROUGH BLEEDING ASSOCIATED WITH INTRAUTERINE DEVICE (IUD): Primary | ICD-10-CM

## 2018-10-04 DIAGNOSIS — Z97.5 IUD (INTRAUTERINE DEVICE) IN PLACE: ICD-10-CM

## 2018-10-04 DIAGNOSIS — N92.1 BREAKTHROUGH BLEEDING ASSOCIATED WITH INTRAUTERINE DEVICE (IUD): Primary | ICD-10-CM

## 2018-10-04 DIAGNOSIS — D25.1 INTRAMURAL LEIOMYOMA OF UTERUS: ICD-10-CM

## 2018-10-04 PROCEDURE — 99214 OFFICE O/P EST MOD 30 MIN: CPT | Performed by: OBSTETRICS & GYNECOLOGY

## 2018-10-04 NOTE — PROGRESS NOTES
Chuckie Carrasco is a 34 y o  female  presenting for discussion of recent ultrasound  The ultrasound performed revealed a 2 4 centimeter intramural fibroid  Patient brought her records from [facility] for comparison  Her last ultrasound from [facility] revealed a 1 4 centimeter intramural fibroid  She also has an IUD in place  She reports postcoital spotting and cramping  It is noted that the IUD is in place in the fundus of the endometrium  She has a questionable history of endometriosis  She has tried the oral contraceptive pills in the past but did not tolerated due to nausea  We discussed the implications of a uterine fibroid  At this size and location, is not necessary to have this fibroid removed  Will likely not interfere with conception  Patient is interested in maintaining future fertility  We will start using the Ortho Evra patch in attempts to control her breakthrough bleeding and cramping  If this tolerated well, we will consider removing the IUD  We did discuss common side effects of the IUD, with breakthrough bleeding being 1 of them  I have spent 25 minutes with Patient  today in which greater than 50% of this time was spent in counseling/coordination of care regarding Diagnostic results, Prognosis, Risks and benefits of tx options, Intructions for management, Importance of tx compliance, Risk factor reductions and Impressions

## 2018-10-09 ENCOUNTER — OFFICE VISIT (OUTPATIENT)
Dept: URGENT CARE | Facility: MEDICAL CENTER | Age: 33
End: 2018-10-09
Payer: COMMERCIAL

## 2018-10-09 VITALS
OXYGEN SATURATION: 97 % | RESPIRATION RATE: 16 BRPM | HEIGHT: 66 IN | SYSTOLIC BLOOD PRESSURE: 100 MMHG | DIASTOLIC BLOOD PRESSURE: 68 MMHG | BODY MASS INDEX: 29.25 KG/M2 | WEIGHT: 182 LBS | TEMPERATURE: 97.5 F | HEART RATE: 82 BPM

## 2018-10-09 DIAGNOSIS — R11.0 NAUSEA: Primary | ICD-10-CM

## 2018-10-09 DIAGNOSIS — H60.502 ACUTE OTITIS EXTERNA OF LEFT EAR, UNSPECIFIED TYPE: ICD-10-CM

## 2018-10-09 DIAGNOSIS — R42 VERTIGO: ICD-10-CM

## 2018-10-09 PROCEDURE — 99203 OFFICE O/P NEW LOW 30 MIN: CPT | Performed by: FAMILY MEDICINE

## 2018-10-09 RX ORDER — ONDANSETRON 4 MG/1
4 TABLET, ORALLY DISINTEGRATING ORAL EVERY 6 HOURS PRN
Status: DISCONTINUED | OUTPATIENT
Start: 2018-10-09 | End: 2020-09-03

## 2018-10-09 RX ORDER — ONDANSETRON 4 MG/1
4 TABLET, ORALLY DISINTEGRATING ORAL EVERY 6 HOURS PRN
Qty: 20 TABLET | Refills: 0 | Status: SHIPPED | OUTPATIENT
Start: 2018-10-09 | End: 2020-08-24 | Stop reason: ALTCHOICE

## 2018-10-09 RX ORDER — METHYLPREDNISOLONE 4 MG/1
TABLET ORAL
Qty: 21 TABLET | Refills: 0 | Status: SHIPPED | OUTPATIENT
Start: 2018-10-09 | End: 2020-08-24 | Stop reason: ALTCHOICE

## 2018-10-09 RX ORDER — NEOMYCIN SULFATE, POLYMYXIN B SULFATE AND HYDROCORTISONE 10; 3.5; 1 MG/ML; MG/ML; [USP'U]/ML
4 SUSPENSION/ DROPS AURICULAR (OTIC) EVERY 8 HOURS SCHEDULED
Qty: 10 ML | Refills: 0 | Status: SHIPPED | OUTPATIENT
Start: 2018-10-09 | End: 2020-08-24 | Stop reason: ALTCHOICE

## 2018-10-09 RX ADMIN — ONDANSETRON 4 MG: 4 TABLET, ORALLY DISINTEGRATING ORAL at 12:23

## 2018-10-09 NOTE — PATIENT INSTRUCTIONS
Acute Nausea and Vomiting   WHAT YOU NEED TO KNOW:   Acute nausea and vomiting start suddenly, worsen quickly, and last a short time  DISCHARGE INSTRUCTIONS:   Return to the emergency department if:   · You see blood in your vomit or your bowel movements  · You have sudden, severe pain in your chest and upper abdomen after hard vomiting or retching  · You have swelling in your neck and chest      · You are dizzy, cold, and thirsty and your eyes and mouth are dry  · You are urinating very little or not at all  · You have muscle weakness, leg cramps, and trouble breathing  · Your heart is beating much faster than normal      · You continue to vomit for more than 48 hours  Contact your healthcare provider if:   · You have frequent dry heaves (vomiting but nothing comes out)  · Your nausea and vomiting does not get better or go away after you use medicine  · You have questions or concerns about your condition or treatment  Medicines: You may need any of the following:  · Medicines  may be given to calm your stomach and stop your vomiting  You may also need medicines to help you feel more relaxed or to stop nausea and vomiting caused by motion sickness  · Gastrointestinal stimulants  are used to help empty your stomach and bowels  This may help decrease nausea and vomiting  · Take your medicine as directed  Contact your healthcare provider if you think your medicine is not helping or if you have side effects  Tell him or her if you are allergic to any medicine  Keep a list of the medicines, vitamins, and herbs you take  Include the amounts, and when and why you take them  Bring the list or the pill bottles to follow-up visits  Carry your medicine list with you in case of an emergency  Prevent or manage acute nausea and vomiting:   · Do not drink alcohol  Alcohol may upset or irritate your stomach  Too much alcohol can also cause acute nausea and vomiting  · Control stress    Headaches due to stress may cause nausea and vomiting  Find ways to relax and manage your stress  Get more rest and sleep  · Drink more liquids as directed  Vomiting can lead to dehydration  It is important to drink more liquids to help replace lost body fluids  Ask your healthcare provider how much liquid to drink each day and which liquids are best for you  Your provider may recommend that you drink an oral rehydration solution (ORS)  ORS contains water, salts, and sugar that are needed to replace the lost body fluids  Ask what kind of ORS to use, how much to drink, and where to get it  · Eat smaller meals, more often  Eat small amounts of food every 2 to 3 hours, even if you are not hungry  Food in your stomach may decrease your nausea  · Talk to your healthcare provider before you take over-the-counter (OTC) medicines  These medicines can cause serious problems if you use certain other medicines, or you have a medical condition  You may have problems if you use too much or use them for longer than the label says  Follow directions on the label carefully  Follow up with your healthcare provider as directed:  Write down your questions so you remember to ask them during your follow-up visits  © 2017 [employer] Information is for End User's use only and may not be sold, redistributed or otherwise used for commercial purposes  All illustrations and images included in CareNotes® are the copyrighted property of [employer]  or Luis Garcia  The above information is an  only  It is not intended as medical advice for individual conditions or treatments  Talk to your doctor, nurse or pharmacist before following any medical regimen to see if it is safe and effective for you

## 2018-10-09 NOTE — LETTER
October 9, 2018     Patient: Rhona Hadley   YOB: 1985   Date of Visit: 10/9/2018       To Whom it May Concern:    Rhona Hadley was seen in my clinic on 10/9/2018  She may return to work on 10/10/18  If you have any questions or concerns, please don't hesitate to call           Sincerely,          Ricardo Rivero MD        CC: No Recipients

## 2018-10-09 NOTE — PROGRESS NOTES
[facility]        NAME: Todd Hart is a 34 y o  female  : 1985    MRN: [id number]  DATE: 2018  TIME: 12:37 PM    Assessment and Plan   Nausea [R11 0]  1  Nausea  ondansetron (ZOFRAN-ODT) dispersible tablet 4 mg    Methylprednisolone 4 MG TBPK    ondansetron (ZOFRAN-ODT) 4 mg disintegrating tablet   2  Acute otitis externa of left ear, unspecified type  neomycin-polymyxin-hydrocortisone (CORTISPORIN) 0 35%-10,000 units/mL-1% otic suspension   3  Vertigo  Methylprednisolone 4 MG TBPK       Patient had relief in her nausea after the Zofran  Administration  This point will sent home with Zofran along with methylprednisolone for her vertigo  Patient was refractory to meclizine during her last episode   follow up with the primary care physician or ENT  Patient Instructions       Follow up with PCP in 3-5 days  Proceed to  ER if symptoms worsen  Chief Complaint     Chief Complaint   Patient presents with    Earache     Pt with complaints of left ear pain , nausea, epigastric pain this am  History of vertigo  Nausea worse when moving head   Nausea         History of Present Illness        Patient complains of nausea since last night  She had an episode of nonbilious nonbloody vomiting this morning  She states that she had 1 previous episode last year which was accompanied vertigo  also there is no erwin vertigo the patient states that this is a preceding event  She has pain left ear pain since yesterday as well  Denies any changes in her hearing  Also complains of loss of appetite  Does have severe allergies takes the Zyrtec   Currently complaining of postnasal discharge secondary to allergies   denies any fevers or chills chest pain or shortness of breath   does have history of GERD and states that she has mild heartburn at the moment         Review of Systems   Review of Systems   as above    Current Medications       Current Outpatient Prescriptions:    amphetamine-dextroamphetamine (ADDERALL) 10 mg tablet, Take 10 mg by mouth 2 (two) times a day, Disp: , Rfl:     Fexofenadine HCl (ALLEGRA PO), Take by mouth, Disp: , Rfl:     levonorgestrel (MIRENA) 20 MCG/24HR IUD, , Disp: , Rfl:     multivitamin (THERAGRAN) TABS, Take 1 tablet by mouth daily, Disp: , Rfl:     norelgestromin-ethinyl estradiol (ORTHO EVRA) 150-35 MCG/24HR, Place 1 patch on the skin once a week, Disp: 3 patch, Rfl: 11    lansoprazole (PREVACID) 30 mg capsule, Take 1 capsule (30 mg total) by mouth daily before breakfast for 30 days, Disp: 30 capsule, Rfl: 5    Methylprednisolone 4 MG TBPK, Use as directed on package, Disp: 21 tablet, Rfl: 0    neomycin-polymyxin-hydrocortisone (CORTISPORIN) 0 35%-10,000 units/mL-1% otic suspension, Administer 4 drops into the left ear every 8 (eight) hours for 5 days, Disp: 10 mL, Rfl: 0    ondansetron (ZOFRAN-ODT) 4 mg disintegrating tablet, Take 1 tablet (4 mg total) by mouth every 6 (six) hours as needed for nausea or vomiting, Disp: 20 tablet, Rfl: 0    Current Facility-Administered Medications:     ondansetron (ZOFRAN-ODT) dispersible tablet 4 mg, 4 mg, Oral, Q6H PRN, Sagar Gonzalez MD, 4 mg at 10/09/18 1223    Current Allergies     Allergies as of 10/09/2018    (No Known Allergies)            The following portions of the patient's history were reviewed and updated as appropriate: allergies, current medications, past family history, past medical history, past social history, past surgical history and problem list      Past Medical History:   Diagnosis Date    Gastritis        Past Surgical History:   Procedure Laterality Date    ANKLE SURGERY      MOUTH SURGERY      MI ESOPHAGOGASTRODUODENOSCOPY TRANSORAL DIAGNOSTIC N/A 1/29/2018    Procedure: ESOPHAGOGASTRODUODENOSCOPY (EGD); Surgeon: Payton Andrade MD;  Location: [facility];   Service: Gastroenterology    TONSILLECTOMY         Family History   Problem Relation Age of Onset    Hyperlipidemia Father     Hyperlipidemia Brother          Medications have been verified  Objective   /68 (BP Location: Left arm, Patient Position: Sitting, Cuff Size: Standard)   Pulse 82   Temp 97 5 °F (36 4 °C) (Tympanic)   Resp 16   Ht 5' 6" (1 676 m)   Wt 82 6 kg (182 lb)   LMP 09/29/2018   SpO2 97%   BMI 29 38 kg/m²        Physical Exam     Physical Exam   Constitutional: She is oriented to person, place, and time  She appears well-developed and well-nourished  She appears distressed  HENT:   Head: Normocephalic and atraumatic  Right Ear: External ear normal    Mouth/Throat: Oropharynx is clear and moist     Left ear canal with mild erythema without exudates or discharge  Left TM appears within normal limits  Eyes: Pupils are equal, round, and reactive to light  Conjunctivae and EOM are normal    Neck: Neck supple  Cardiovascular: Normal rate, regular rhythm and normal heart sounds  Pulmonary/Chest: Effort normal and breath sounds normal  No respiratory distress  She has no wheezes  Abdominal: Soft  Musculoskeletal: Normal range of motion  Neurological: She is alert and oriented to person, place, and time  No cranial nerve deficit  Skin: Skin is warm and dry  Psychiatric: She has a normal mood and affect  Her behavior is normal    Nursing note and vitals reviewed

## 2018-11-19 ENCOUNTER — OFFICE VISIT (OUTPATIENT)
Dept: GASTROENTEROLOGY | Facility: MEDICAL CENTER | Age: 33
End: 2018-11-19
Payer: COMMERCIAL

## 2018-11-19 VITALS
DIASTOLIC BLOOD PRESSURE: 70 MMHG | SYSTOLIC BLOOD PRESSURE: 118 MMHG | WEIGHT: 183 LBS | HEIGHT: 66 IN | BODY MASS INDEX: 29.41 KG/M2 | TEMPERATURE: 98.6 F | HEART RATE: 60 BPM

## 2018-11-19 DIAGNOSIS — A04.8 H. PYLORI INFECTION: ICD-10-CM

## 2018-11-19 DIAGNOSIS — K21.9 GASTROESOPHAGEAL REFLUX DISEASE WITHOUT ESOPHAGITIS: ICD-10-CM

## 2018-11-19 DIAGNOSIS — K29.60 OTHER GASTRITIS WITHOUT HEMORRHAGE, UNSPECIFIED CHRONICITY: Primary | ICD-10-CM

## 2018-11-19 PROCEDURE — 99214 OFFICE O/P EST MOD 30 MIN: CPT | Performed by: PHYSICIAN ASSISTANT

## 2018-11-19 NOTE — PROGRESS NOTES
[facility] - Outpatient Follow-up Note  Beau Fregoso 34 y o  female MRN: [id number]  Encounter: [id number]      ASSESSMENT AND PLAN:    1  Helicobacter positive gastritis   -stool antigen test after treatment was negative, confirming eradication and her symptoms entirely resolved at that point,unfortunately she had recurrence of her symptoms about a month ago  -recheck H pylori stool test, she has been off of her PPI for 3 weeks    -she is concerned that her  also has H pylori as he has symptoms and she is maria t this from him, I recommend that he call the office directly regarding his symptoms    -consider repeat upper endoscopy with stomach biopsies if H pylori stool test is still negative and she has persistent symptoms      2  GERD without esophagitis   -she states that her reflux was well controlled until a month ago, she is concerned that she has recurrence of the H pylori, see plan for 1    -she did discontinue the lansoprazole about 3 weeks ago, I let her know that she can restart this after reviewing the H pylori stool test   We discussed the importance of tapering this rather than stopping it abruptly    -again discussed dietary and lifestyle modifications   -if the H pylori stool test is negative, could consider repeat endoscopy to confirm that the stool tests are not a false negative    -if she continues to have significant GERD symptoms despite H pylori eradication, consider esophageal manometry and pH testing for further evaluation and for consideration of STRETTA  She is young and would prefer not to be on lifelong medication  -follow-up in the office in 3 months to see how she is doing    ____________________________________________________________    SUBJECTIVE:    79-year-old female presents to the office for follow-up, she had an upper endoscopy earlier this year with gastritis noted, biopsies were positive for H pylori    She was treated with triple therapy and had a subsequent negative stool antigen test   Her upper endoscopy was otherwise normal  She reports that after treatment she was feeling very well however her symptoms recurred about a month ago  She is having nausea, vomiting, substernal burning as well as abdominal pain after eating  She states her symptoms are exactly the same as when she 1st presented was diagnosed with H pylori  She is concerned that her  also has H pylori and she is maria t it from him  She reports that her symptoms are primarily when she drinks coffee, sodas, eats spicy foods or fatty foods  She has never had a colonoscopy  REVIEW OF SYSTEMS IS OTHERWISE NEGATIVE  Historical Information   Past Medical History:   Diagnosis Date    Gastritis      Past Surgical History:   Procedure Laterality Date    ANKLE SURGERY      MOUTH SURGERY      KS ESOPHAGOGASTRODUODENOSCOPY TRANSORAL DIAGNOSTIC N/A 1/29/2018    Procedure: ESOPHAGOGASTRODUODENOSCOPY (EGD); Surgeon: Sue Antonio MD;  Location[facility];   Service: Gastroenterology    TONSILLECTOMY       Social History   History   Alcohol Use    Yes     Comment: social     History   Drug Use No     History   Smoking Status    Never Smoker   Smokeless Tobacco    Never Used     Family History   Problem Relation Age of Onset    Hyperlipidemia Father     Hyperlipidemia Brother        Meds/Allergies       Current Outpatient Prescriptions:     amphetamine-dextroamphetamine (ADDERALL) 10 mg tablet    Fexofenadine HCl (ALLEGRA PO)    lansoprazole (PREVACID) 30 mg capsule    levonorgestrel (MIRENA) 20 MCG/24HR IUD    Methylprednisolone 4 MG TBPK    multivitamin (THERAGRAN) TABS    neomycin-polymyxin-hydrocortisone (CORTISPORIN) 0 35%-10,000 units/mL-1% otic suspension    norelgestromin-ethinyl estradiol (ORTHO EVRA) 150-35 MCG/24HR    ondansetron (ZOFRAN-ODT) 4 mg disintegrating tablet    Current Facility-Administered Medications:    ondansetron (ZOFRAN-ODT) dispersible tablet 4 mg, 4 mg, Oral, Q6H PRN, 4 mg at 10/09/18 1223    No Known Allergies        Objective     Blood pressure 118/70, pulse 60, temperature 98 6 °F (37 °C), temperature source Tympanic, height 5' 6" (1 676 m), weight 83 kg (183 lb), unknown if currently breastfeeding  PHYSICAL EXAM:      Physical Exam   Constitutional: She is oriented to person, place, and time  No distress  overweight   HENT:   Head: Normocephalic and atraumatic  Eyes: Right eye exhibits no discharge  Left eye exhibits no discharge  No scleral icterus  Neck: Neck supple  No tracheal deviation present  Cardiovascular: Normal rate, regular rhythm and normal heart sounds  Exam reveals no gallop and no friction rub  No murmur heard  Pulmonary/Chest: Effort normal  No respiratory distress  She has no wheezes  She has no rales  Abdominal: Soft  Bowel sounds are normal  She exhibits no distension  There is tenderness (epigastric)  There is no rebound and no guarding  Neurological: She is alert and oriented to person, place, and time  Skin: Skin is warm and dry  Psychiatric: She has a normal mood and affect  Lab Results:   No visits with results within 1 Day(s) from this visit     Latest known visit with results is:   Office Visit on 07/13/2018   Component Date Value    Case Report 07/13/2018                      Value:Gynecologic Cytology Report                       Case: [id number]                                  Authorizing Provider:  Mihai Smiley MD          Collected:           07/13/2018 1513              Ordering Location:     [facility]  Received:            07/13/2018 [address]                                                           First Screen:          ABIMBOLA Call                                                       Rescreen:              Mary Kuhn Specimen:    LIQUID-BASED PAP, SCREENING, Endocervical                                                  Primary Interpretation 07/13/2018 Negative for intraepithelial lesion or malignancy     Specimen Adequacy 07/13/2018 Satisfactory for evaluation  Endocervical/transformation zone component present   High Risk HPV Result 07/13/2018                      Value:HPV, High Risk: HPV NEG, HPV16 NEG, HPV18 NEG      Other High Risk HPV Negative, HPV 16 Negative, HPV 18 Negative  HPV types: 16,18,31,33,35,39,45,51,52,56,58,59,66 and 68 DNA are undetectable or below the pre-set threshold  Roches FDA approved Lesia 4800 is utilized with strict adherence to the s instruction  manual to test for the presence of High-Risk HPV DNA, as well as HPV 16 and HPV 18  This instrument  has been validated by our laboratory and/or by the   A negative result does not preclude the presence of HPV infection because results depend on adequate  specimen collection, absence of inhibitors and sufficient DNA to be detected  Additionally, HPV negative  results are not intended to prevent women from proceeding to colposcopy if clinically warranted  Positive HPV test results indicate the presence of any one or more of the high risk types, but since patients  are often co-infected with low-risk types it does not rule out the presence of low-risk                           types in patients  with mixed infections   Additional Information 07/13/2018                      Value: This result contains rich text formatting which cannot be displayed here   HPV, High Risk 07/13/2018 HPV NEG, HPV16 NEG, HPV18 NEG          Radiology Results:   No results found

## 2018-11-26 ENCOUNTER — APPOINTMENT (OUTPATIENT)
Dept: LAB | Facility: MEDICAL CENTER | Age: 33
End: 2018-11-26
Payer: COMMERCIAL

## 2018-11-26 DIAGNOSIS — K29.60 OTHER GASTRITIS WITHOUT HEMORRHAGE, UNSPECIFIED CHRONICITY: ICD-10-CM

## 2018-11-26 PROCEDURE — 87338 HPYLORI STOOL AG IA: CPT

## 2018-11-27 LAB — H PYLORI AG STL QL IA: NEGATIVE

## 2018-12-19 DIAGNOSIS — B96.81 HELICOBACTER POSITIVE GASTRITIS: ICD-10-CM

## 2018-12-19 DIAGNOSIS — K29.70 HELICOBACTER POSITIVE GASTRITIS: ICD-10-CM

## 2018-12-19 DIAGNOSIS — K21.9 GERD WITHOUT ESOPHAGITIS: ICD-10-CM

## 2018-12-19 RX ORDER — LANSOPRAZOLE 30 MG/1
30 CAPSULE, DELAYED RELEASE ORAL
Qty: 30 CAPSULE | Refills: 0 | Status: SHIPPED | OUTPATIENT
Start: 2018-12-19 | End: 2019-01-15 | Stop reason: SDUPTHER

## 2018-12-19 RX ORDER — LANSOPRAZOLE 30 MG/1
30 CAPSULE, DELAYED RELEASE ORAL
Qty: 30 CAPSULE | Refills: 5 | Status: SHIPPED | OUTPATIENT
Start: 2018-12-19 | End: 2020-08-24 | Stop reason: ALTCHOICE

## 2018-12-19 NOTE — TELEPHONE ENCOUNTER
Dr Pierce Alcazar pt    Pt is requesting a refill for lansoprazole 30mg   Please send to [facility] [address]

## 2019-01-15 DIAGNOSIS — K29.70 HELICOBACTER POSITIVE GASTRITIS: ICD-10-CM

## 2019-01-15 DIAGNOSIS — K21.9 GERD WITHOUT ESOPHAGITIS: ICD-10-CM

## 2019-01-15 DIAGNOSIS — B96.81 HELICOBACTER POSITIVE GASTRITIS: ICD-10-CM

## 2019-01-15 RX ORDER — LANSOPRAZOLE 30 MG/1
30 CAPSULE, DELAYED RELEASE ORAL
Qty: 30 CAPSULE | Refills: 0 | Status: SHIPPED | OUTPATIENT
Start: 2019-01-15 | End: 2019-03-15

## 2019-02-08 DIAGNOSIS — Z30.44 ENCOUNTER FOR SURVEILLANCE OF VAGINAL RING HORMONAL CONTRACEPTIVE DEVICE: Primary | ICD-10-CM

## 2019-02-08 RX ORDER — ETONOGESTREL AND ETHINYL ESTRADIOL 11.7; 2.7 MG/1; MG/1
INSERT, EXTENDED RELEASE VAGINAL
Qty: 1 EACH | Refills: 3 | Status: SHIPPED | OUTPATIENT
Start: 2019-02-08 | End: 2019-06-12 | Stop reason: SDUPTHER

## 2019-02-20 DIAGNOSIS — K21.9 GERD WITHOUT ESOPHAGITIS: ICD-10-CM

## 2019-02-20 DIAGNOSIS — B96.81 HELICOBACTER POSITIVE GASTRITIS: ICD-10-CM

## 2019-02-20 DIAGNOSIS — K29.70 HELICOBACTER POSITIVE GASTRITIS: ICD-10-CM

## 2019-02-20 RX ORDER — LANSOPRAZOLE 30 MG/1
30 CAPSULE, DELAYED RELEASE ORAL
Qty: 30 CAPSULE | Refills: 0 | OUTPATIENT
Start: 2019-02-20 | End: 2019-03-22

## 2019-02-25 ENCOUNTER — OFFICE VISIT (OUTPATIENT)
Dept: GASTROENTEROLOGY | Facility: MEDICAL CENTER | Age: 34
End: 2019-02-25
Payer: COMMERCIAL

## 2019-02-25 VITALS
TEMPERATURE: 98.4 F | WEIGHT: 192 LBS | SYSTOLIC BLOOD PRESSURE: 116 MMHG | BODY MASS INDEX: 30.99 KG/M2 | DIASTOLIC BLOOD PRESSURE: 64 MMHG | HEART RATE: 71 BPM

## 2019-02-25 DIAGNOSIS — R13.19 ESOPHAGEAL DYSPHAGIA: ICD-10-CM

## 2019-02-25 DIAGNOSIS — K21.9 GERD WITHOUT ESOPHAGITIS: Primary | ICD-10-CM

## 2019-02-25 PROCEDURE — 99214 OFFICE O/P EST MOD 30 MIN: CPT | Performed by: INTERNAL MEDICINE

## 2019-02-25 NOTE — PROGRESS NOTES
[facility] - Outpatient Follow-up Note  Stephanie Burton 34 y o  female MRN: [id number]  Encounter: [id number]          ASSESSMENT AND PLAN:      1  GERD without esophagitis  2  Esophageal dysphagia  I am concerned that her symptoms have not completely resolved on the lansoprazole even after she has made dietary and lifestyle modifications  I encouraged her to continue the lansoprazole for now and will check a gastric emptying study to rule out gastroparesis and a pH with impedance and manometry testing to measure the motility of her esophagus and see if she has refractory acid reflux or non acid reflux  I will have her follow up with me to discuss the results after these are completed  We also again discussed dietary and lifestyle modifications that can help her symptoms   - NM gastric emptying; Future  - Espoh manometry/24hr ph; Future    ______________________________________________________________________    SUBJECTIVE:  She presents for follow-up of her reflux, difficulty swallowing, and Helicobacter pylori infection  She was treated with triple therapy and her stool test was negative for Helicobacter pylori  She felt her symptoms improved after treatment and is happy to hear that the infection is now gone  She continues to have reflux symptoms and is concerned that she has been unable to stop the lansoprazole as whenever she tries to stop at the reflux gets worse  Even on lansoprazole she continues to have occasional symptoms of reflux and the sensation of food getting stuck when she swallows  She also complains of nausea and vomiting  She denies any diarrhea constipation bleeding and weight loss  REVIEW OF SYSTEMS IS OTHERWISE NEGATIVE        Historical Information   Past Medical History:   Diagnosis Date    Gastritis      Past Surgical History:   Procedure Laterality Date    ANKLE SURGERY      MOUTH SURGERY      AR ESOPHAGOGASTRODUODENOSCOPY TRANSORAL DIAGNOSTIC N/A 1/29/2018    Procedure: ESOPHAGOGASTRODUODENOSCOPY (EGD); Surgeon: Ivis Garg MD;  Location: [facility] GI [facility]; Service: Gastroenterology    TONSILLECTOMY       Social History   Social History     Substance and Sexual Activity   Alcohol Use Yes    Comment: social     Social History     Substance and Sexual Activity   Drug Use No     Social History     Tobacco Use   Smoking Status Never Smoker   Smokeless Tobacco Never Used     Family History   Problem Relation Age of Onset    Hyperlipidemia Father     Hyperlipidemia Brother        Meds/Allergies       Current Outpatient Medications:     amphetamine-dextroamphetamine (ADDERALL) 10 mg tablet    etonogestrel-ethinyl estradiol (NUVARING) 0 12-0 015 MG/24HR vaginal ring    Fexofenadine HCl (ALLEGRA PO)    levonorgestrel (MIRENA) 20 MCG/24HR IUD    multivitamin (THERAGRAN) TABS    norelgestromin-ethinyl estradiol (ORTHO EVRA) 150-35 MCG/24HR    lansoprazole (PREVACID) 30 mg capsule    lansoprazole (PREVACID) 30 mg capsule    Methylprednisolone 4 MG TBPK    neomycin-polymyxin-hydrocortisone (CORTISPORIN) 0 35%-10,000 units/mL-1% otic suspension    ondansetron (ZOFRAN-ODT) 4 mg disintegrating tablet    Current Facility-Administered Medications:     ondansetron (ZOFRAN-ODT) dispersible tablet 4 mg, 4 mg, Oral, Q6H PRN, 4 mg at 10/09/18 1223    No Known Allergies        Objective     Blood pressure 116/64, pulse 71, temperature 98 4 °F (36 9 °C), temperature source Tympanic, weight 87 1 kg (192 lb), unknown if currently breastfeeding  Body mass index is 30 99 kg/m²  PHYSICAL EXAM:      General Appearance:   Alert, cooperative, no distress   HEENT:   Normocephalic, atraumatic, anicteric      Neck:  Supple, symmetrical, trachea midline   Lungs:   Clear to auscultation bilaterally; no rales, rhonchi or wheezing; respirations unlabored    Heart[de-identified]   Regular rate and rhythm; no murmur, rub, or gallop     Abdomen:   Soft, non-tender, non-distended; normal bowel sounds; no masses, no organomegaly    Genitalia:   Deferred    Rectal:   Deferred    Extremities:  No cyanosis, clubbing or edema    Pulses:  2+ and symmetric    Skin:  No jaundice, rashes, or lesions    Lymph nodes:  No palpable cervical lymphadenopathy        Lab Results:   No visits with results within 1 Day(s) from this visit  Latest known visit with results is:   Appointment on 11/26/2018   Component Date Value    H pylori Ag, Stl 11/26/2018 Negative          Radiology Results:   No results found

## 2019-03-14 DIAGNOSIS — K29.70 HELICOBACTER POSITIVE GASTRITIS: ICD-10-CM

## 2019-03-14 DIAGNOSIS — K21.9 GERD WITHOUT ESOPHAGITIS: ICD-10-CM

## 2019-03-14 DIAGNOSIS — B96.81 HELICOBACTER POSITIVE GASTRITIS: ICD-10-CM

## 2019-03-15 DIAGNOSIS — B96.81 HELICOBACTER POSITIVE GASTRITIS: ICD-10-CM

## 2019-03-15 DIAGNOSIS — K29.70 HELICOBACTER POSITIVE GASTRITIS: ICD-10-CM

## 2019-03-15 DIAGNOSIS — K21.9 GERD WITHOUT ESOPHAGITIS: ICD-10-CM

## 2019-03-15 RX ORDER — LANSOPRAZOLE 30 MG/1
30 CAPSULE, DELAYED RELEASE ORAL
Qty: 30 CAPSULE | Refills: 5 | OUTPATIENT
Start: 2019-03-15 | End: 2019-04-14

## 2019-03-15 RX ORDER — LANSOPRAZOLE 30 MG/1
30 CAPSULE, DELAYED RELEASE ORAL
Qty: 30 CAPSULE | Refills: 0 | Status: CANCELLED | OUTPATIENT
Start: 2019-03-15 | End: 2019-04-14

## 2019-03-15 RX ORDER — LANSOPRAZOLE 30 MG/1
30 CAPSULE, DELAYED RELEASE ORAL
Qty: 30 CAPSULE | Refills: 0 | Status: SHIPPED | OUTPATIENT
Start: 2019-03-15 | End: 2019-04-15 | Stop reason: SDUPTHER

## 2019-03-28 ENCOUNTER — DOCUMENTATION (OUTPATIENT)
Dept: GASTROENTEROLOGY | Facility: CLINIC | Age: 34
End: 2019-03-28

## 2019-03-28 NOTE — PROGRESS NOTES
Prior authorization for CPT code 31892 has been approved through [employer]  Prior authorization # is [id number], valid 3/28/2019 - 5/27/2019  Authorization form was faxed back to [place],  @ [phone]

## 2019-04-01 ENCOUNTER — HOSPITAL ENCOUNTER (OUTPATIENT)
Dept: NUCLEAR MEDICINE | Facility: HOSPITAL | Age: 34
Discharge: HOME | End: 2019-04-01
Attending: INTERNAL MEDICINE
Payer: COMMERCIAL

## 2019-04-01 DIAGNOSIS — K21.9 GERD WITHOUT ESOPHAGITIS: ICD-10-CM

## 2019-04-01 DIAGNOSIS — R13.19 ESOPHAGEAL DYSPHAGIA: ICD-10-CM

## 2019-04-01 PROCEDURE — 78264 GASTRIC EMPTYING IMG STUDY: CPT

## 2019-04-01 PROCEDURE — A9541 TC99M SULFUR COLLOID: HCPCS

## 2019-04-15 DIAGNOSIS — K21.9 GERD WITHOUT ESOPHAGITIS: ICD-10-CM

## 2019-04-15 DIAGNOSIS — B96.81 HELICOBACTER POSITIVE GASTRITIS: ICD-10-CM

## 2019-04-15 DIAGNOSIS — K29.70 HELICOBACTER POSITIVE GASTRITIS: ICD-10-CM

## 2019-04-15 RX ORDER — LANSOPRAZOLE 30 MG/1
30 CAPSULE, DELAYED RELEASE ORAL
Qty: 30 CAPSULE | Refills: 0 | Status: SHIPPED | OUTPATIENT
Start: 2019-04-15 | End: 2019-05-19 | Stop reason: SDUPTHER

## 2019-04-18 ENCOUNTER — HOSPITAL ENCOUNTER (OUTPATIENT)
Dept: GASTROENTEROLOGY | Facility: HOSPITAL | Age: 34
Discharge: HOME | End: 2019-04-18
Attending: INTERNAL MEDICINE
Payer: COMMERCIAL

## 2019-04-18 VITALS
HEART RATE: 80 BPM | OXYGEN SATURATION: 96 % | SYSTOLIC BLOOD PRESSURE: 118 MMHG | RESPIRATION RATE: 16 BRPM | DIASTOLIC BLOOD PRESSURE: 61 MMHG | TEMPERATURE: 98 F

## 2019-04-18 DIAGNOSIS — R13.19 ESOPHAGEAL DYSPHAGIA: ICD-10-CM

## 2019-04-18 DIAGNOSIS — K21.9 GERD WITHOUT ESOPHAGITIS: ICD-10-CM

## 2019-04-18 PROCEDURE — 91038 ESOPH IMPED FUNCT TEST > 1HR: CPT

## 2019-05-03 ENCOUNTER — TELEPHONE (OUTPATIENT)
Dept: GASTROENTEROLOGY | Facility: CLINIC | Age: 34
End: 2019-05-03

## 2019-05-03 DIAGNOSIS — R13.19 ESOPHAGEAL DYSPHAGIA: Primary | ICD-10-CM

## 2019-05-19 DIAGNOSIS — K29.70 HELICOBACTER POSITIVE GASTRITIS: ICD-10-CM

## 2019-05-19 DIAGNOSIS — B96.81 HELICOBACTER POSITIVE GASTRITIS: ICD-10-CM

## 2019-05-19 DIAGNOSIS — K21.9 GERD WITHOUT ESOPHAGITIS: ICD-10-CM

## 2019-05-20 RX ORDER — LANSOPRAZOLE 30 MG/1
30 CAPSULE, DELAYED RELEASE ORAL
Qty: 30 CAPSULE | Refills: 3 | Status: SHIPPED | OUTPATIENT
Start: 2019-05-20 | End: 2019-06-19

## 2019-06-12 DIAGNOSIS — Z30.44 ENCOUNTER FOR SURVEILLANCE OF VAGINAL RING HORMONAL CONTRACEPTIVE DEVICE: ICD-10-CM

## 2019-06-12 RX ORDER — ETONOGESTREL AND ETHINYL ESTRADIOL 11.7; 2.7 MG/1; MG/1
INSERT, EXTENDED RELEASE VAGINAL
Qty: 3 EACH | Refills: 3 | Status: SHIPPED | OUTPATIENT
Start: 2019-06-12 | End: 2020-08-24 | Stop reason: ALTCHOICE

## 2019-07-25 ENCOUNTER — TELEPHONE (OUTPATIENT)
Dept: OBGYN CLINIC | Facility: MEDICAL CENTER | Age: 34
End: 2019-07-25

## 2019-07-25 NOTE — TELEPHONE ENCOUNTER
----- Message from Uriel Bunn sent at 7/25/2019  8:42 AM EDT -----  Regarding: RE: Dewayne Don  lmovm to cb     ----- Message -----  From: Uriel Bunn  Sent: 7/25/2019   8:32 AM EDT  To: Uriel Bunn  Subject: RE: Dewayne Don                                      I contacted pt's insurance  Pt effective 1/1/19  Pt is covered for removal at 100%  Will be subject to pt's $200 deductible  Deductible has been met  Jeffrey Chapman Ref# [id number]    ----- Message -----  From: Carrillo Adam  Sent: 7/24/2019   3:06 PM EDT  To: Uriel Bunn  Subject: Preaut                                          Pt wants mirena removed  Says its been 3 yrs since placement  Please shellih

## 2019-09-06 ENCOUNTER — PROCEDURE VISIT (OUTPATIENT)
Dept: OBGYN CLINIC | Facility: MEDICAL CENTER | Age: 34
End: 2019-09-06
Payer: COMMERCIAL

## 2019-09-06 VITALS — WEIGHT: 200.4 LBS | DIASTOLIC BLOOD PRESSURE: 80 MMHG | SYSTOLIC BLOOD PRESSURE: 106 MMHG | BODY MASS INDEX: 32.35 KG/M2

## 2019-09-06 DIAGNOSIS — N92.1 BREAKTHROUGH BLEEDING ASSOCIATED WITH INTRAUTERINE DEVICE (IUD): ICD-10-CM

## 2019-09-06 DIAGNOSIS — Z30.432 ENCOUNTER FOR REMOVAL OF INTRAUTERINE CONTRACEPTIVE DEVICE (IUD): Primary | ICD-10-CM

## 2019-09-06 DIAGNOSIS — Z97.5 BREAKTHROUGH BLEEDING ASSOCIATED WITH INTRAUTERINE DEVICE (IUD): ICD-10-CM

## 2019-09-06 PROCEDURE — 58301 REMOVE INTRAUTERINE DEVICE: CPT | Performed by: OBSTETRICS & GYNECOLOGY

## 2019-09-06 NOTE — PROGRESS NOTES
Iud removal  Date/Time: 9/6/2019 3:29 PM  Performed by: Sudeep Avila MD  Authorized by: Sudeep Avila MD     Consent:     Consent obtained:  Verbal and written    Consent given by:  Patient    Procedure risks and benefits discussed: yes      Patient questions answered: yes      Patient agrees, verbalizes understanding, and wants to proceed: yes      Educational handouts given: yes    Universal protocol:     Patient states understanding of procedure being performed: yes    Procedure:     Removed with no complications: yes      Other reason for removal:  Breakthrough bleeding      Patient was also noted to have a Diva cup, which was removed prior to removing the IUD

## 2019-09-06 NOTE — PATIENT INSTRUCTIONS
Thank you for your confidence in our team    We appreciate you and welcome your feedback  If you receive a survey from us, please take a few moments to let us know how we are doing     Sincerely,   Arleth Caro MD

## 2020-06-29 ENCOUNTER — APPOINTMENT (OUTPATIENT)
Dept: LAB | Facility: MEDICAL CENTER | Age: 35
End: 2020-06-29
Payer: COMMERCIAL

## 2020-06-29 DIAGNOSIS — N91.2 AMENORRHEA: Primary | ICD-10-CM

## 2020-06-29 DIAGNOSIS — N91.2 AMENORRHEA: ICD-10-CM

## 2020-06-29 LAB
B-HCG SERPL-ACNC: 44 MIU/ML
PROGEST SERPL-MCNC: 29.5 NG/ML

## 2020-06-29 PROCEDURE — 84702 CHORIONIC GONADOTROPIN TEST: CPT

## 2020-06-29 PROCEDURE — 84144 ASSAY OF PROGESTERONE: CPT

## 2020-06-29 PROCEDURE — 36415 COLL VENOUS BLD VENIPUNCTURE: CPT

## 2020-06-30 DIAGNOSIS — N91.2 AMENORRHEA: Primary | ICD-10-CM

## 2020-07-06 ENCOUNTER — APPOINTMENT (OUTPATIENT)
Dept: LAB | Facility: MEDICAL CENTER | Age: 35
End: 2020-07-06
Payer: COMMERCIAL

## 2020-07-06 DIAGNOSIS — N91.2 AMENORRHEA: ICD-10-CM

## 2020-07-06 LAB — B-HCG SERPL-ACNC: 1008 MIU/ML

## 2020-07-06 PROCEDURE — 84702 CHORIONIC GONADOTROPIN TEST: CPT

## 2020-07-06 PROCEDURE — 36415 COLL VENOUS BLD VENIPUNCTURE: CPT

## 2020-07-07 DIAGNOSIS — N91.2 AMENORRHEA: Primary | ICD-10-CM

## 2020-07-13 ENCOUNTER — APPOINTMENT (OUTPATIENT)
Dept: LAB | Facility: MEDICAL CENTER | Age: 35
End: 2020-07-13
Payer: COMMERCIAL

## 2020-07-13 DIAGNOSIS — N91.2 AMENORRHEA: ICD-10-CM

## 2020-07-13 LAB — B-HCG SERPL-ACNC: ABNORMAL MIU/ML

## 2020-07-13 PROCEDURE — 84702 CHORIONIC GONADOTROPIN TEST: CPT

## 2020-07-13 PROCEDURE — 36415 COLL VENOUS BLD VENIPUNCTURE: CPT

## 2020-07-27 ENCOUNTER — OFFICE VISIT (OUTPATIENT)
Dept: OBGYN CLINIC | Facility: MEDICAL CENTER | Age: 35
End: 2020-07-27
Payer: COMMERCIAL

## 2020-07-27 VITALS
HEIGHT: 66 IN | DIASTOLIC BLOOD PRESSURE: 60 MMHG | TEMPERATURE: 98.6 F | SYSTOLIC BLOOD PRESSURE: 100 MMHG | BODY MASS INDEX: 36.32 KG/M2 | WEIGHT: 226 LBS

## 2020-07-27 DIAGNOSIS — N91.2 AMENORRHEA: Primary | ICD-10-CM

## 2020-07-27 PROCEDURE — 99214 OFFICE O/P EST MOD 30 MIN: CPT | Performed by: OBSTETRICS & GYNECOLOGY

## 2020-07-27 PROCEDURE — 76830 TRANSVAGINAL US NON-OB: CPT | Performed by: OBSTETRICS & GYNECOLOGY

## 2020-07-27 NOTE — PROGRESS NOTES
LMP - 6/5/20 [facility] 3/12/21 @ 7w3d  Sonogram consistent with 7w 6 days will use the LMP dating      Pregnancy Counseling     1  Testing   Advised of the labs that we drawl and why and when they should be done     Genetic testing    CF / SMA - info given for codes to call insurance company for coverage      Nuchal / blood - pt explained timing of the test and why we do it      US - dating and anatomy scan     2  Life style    Exercise    Cont with the plan that done now if nothing recommend exercise 3 x week for  20 min    Weight gain - 25-30 pounds total gain    No smoking or drinking and no drugs       3   Eating    restriction and recommendation of diet during pregnancy

## 2020-08-03 ENCOUNTER — TELEPHONE (OUTPATIENT)
Dept: OBGYN CLINIC | Facility: MEDICAL CENTER | Age: 35
End: 2020-08-03

## 2020-08-03 NOTE — TELEPHONE ENCOUNTER
The patient called in this morning and stated that she is having sinus symptoms and per Gil Valdovinos I told the patient to call her PCP and let them know her symptoms and see if they recommend her to be tested    She was also told to call us back and let us know what she was recommended to do by her PCP

## 2020-08-19 NOTE — PATIENT INSTRUCTIONS
Pregnancy at 7 to [address]:   What changes are happening to your body:  Pregnancy hormones may cause your body to go through many changes during this stage of your pregnancy  You may feel more tired than usual, and have mood swings, nausea and vomiting, and headaches  Your breasts may feel tender and swollen and you may urinate more frequently  Seek care immediately if:   · You have pain or cramping in your abdomen or low back  · You have heavy vaginal bleeding or clotting  · You pass material that looks like tissue or large clots  Collect the material and bring it with you  Contact your healthcare provider if:   · You have light bleeding  · You have chills or a fever  · You have vaginal itching, burning, or pain  · You have yellow, green, white, or foul-smelling vaginal discharge  · You have pain or burning when you urinate, less urine than usual, or pink or bloody urine  · You have questions or concerns about your condition or care  How to care for yourself at this stage of your pregnancy:   · Manage nausea and vomiting  Avoid fatty and spicy foods  Eat small meals throughout the day instead of large meals  Aniya may help to decrease nausea  Ask your healthcare provider about other ways of decreasing nausea and vomiting  · Eat a variety of healthy foods  Healthy foods include fruits, vegetables, whole-grain breads, low-fat dairy foods, beans, lean meats, and fish  Drink liquids as directed  Ask how much liquid to drink each day and which liquids are best for you  Limit caffeine to less than 200 milligrams each day  Limit your intake of fish to 2 servings each week  Choose fish low in mercury such as canned light tuna, shrimp, salmon, cod, or tilapia  Do not  eat fish high in mercury such as swordfish, tilefish, sharon mackerel, and shark  · Take prenatal vitamins as directed    Your need for certain vitamins and minerals, such as folic acid, increases during pregnancy  Prenatal vitamins provide some of the extra vitamins and minerals you need  Prenatal vitamins may also help to decrease the risk of certain birth defects  · Ask how much weight you should gain each month  Too much or too little weight gain can be unhealthy for you and your baby  · Do not smoke  If you smoke, it is never too late to quit  Smoking increases your risk of a miscarriage and other health problems during your pregnancy  Smoking can cause your baby to be born too early or weigh less at birth  Ask your healthcare provider for information if you need help quitting  · Do not drink alcohol  Alcohol passes from your body to your baby through the placenta  It can affect your baby's brain development and cause fetal alcohol syndrome (FAS)  FAS is a group of conditions that causes mental, behavior, and growth problems  · Talk to your healthcare provider before you take any medicines  Many medicines may harm your baby if you take them when you are pregnant  Do not take any medicines, vitamins, herbs, or supplements without first talking to your healthcare provider  Never use illegal or street drugs (such as marijuana or cocaine) while you are pregnant  Safety tips during pregnancy:   · Avoid hot tubs and saunas  Do not use a hot tub or sauna while you are pregnant, especially during your first trimester  Hot tubs and saunas may raise your baby's temperature and increase the risk of birth defects  · Avoid toxoplasmosis  This is an infection caused by eating raw meat or being around infected cat feces  It can cause birth defects, miscarriages, and other problems  Wash your hands after you touch raw meat  Make sure any meat is well-cooked before you eat it  Avoid raw eggs and unpasteurized milk  Use gloves or ask someone else to clean your cat's litter box while you are pregnant    Changes that are happening with your baby:  By 10 weeks, your baby will be about 2 ½ inches long from the top of the head to the rump (baby's bottom)  Your baby weighs about ½ ounce  Major body organs, such as the brain, heart, and lungs, are forming  Your baby's facial features are also starting to form  What you need to know about prenatal care:  Prenatal care is a series of visits with your healthcare provider throughout your pregnancy  During the first 28 weeks of your pregnancy, you will see your healthcare provider once a month  Prenatal care can help prevent problems during pregnancy and childbirth  Your healthcare provider will ask questions about your health and any previous pregnancies you have had  He will also ask about any medicines you are taking  You may also need any of the following:  · A pap smear  will be done to check your cervix for abnormal cells  The cervix is the narrow opening at the bottom of your uterus  The cervix meets the top part of the vagina  · A pelvic exam  allows your healthcare provider to see your cervix (the bottom part of your uterus)  Your healthcare provider uses a speculum to gently open your vagina  He will check the size and shape of your uterus  · Blood tests  may be done to check for anemia or blood type  Your healthcare provider may also order other blood tests to check if you are immune to certain diseases such as Hepatitis B  He may also recommend an HIV test     · Urine tests  may also be done to check for signs of infection  · Your blood pressure and weight  will be checked  © 2017 [employer] Information is for End User's use only and may not be sold, redistributed or otherwise used for commercial purposes  All illustrations and images included in CareNotes® are the copyrighted property of [employer] M [employer]  or Luis Garcia  The above information is an  only  It is not intended as medical advice for individual conditions or treatments   Talk to your doctor, nurse or pharmacist before following any medical regimen to see if it is safe and effective for you  Pregnancy at 11 to 14 Weeks   AMBULATORY CARE:   What changes are happening to your body: You are now at the end of your first trimester and entering your second trimester  Morning sickness usually goes away by this time  You may have other symptoms such as fatigue, frequent urination, and headaches  You may have gained between 2 to 4 pounds by now  Seek care immediately if:   · You have pain or cramping in your abdomen or low back  · You have heavy vaginal bleeding or clotting  · You pass material that looks like tissue or large clots  Collect the material and bring it with you  Contact your healthcare provider if:   · You cannot keep food or drinks down, and you are losing weight  · You have light bleeding  · You have chills or a fever  · You have vaginal itching, burning, or pain  · You have yellow, green, white, or foul-smelling vaginal discharge  · You have pain or burning when you urinate, less urine than usual, or pink or bloody urine  · You have questions or concerns about your condition or care  How to care for yourself at this stage of your pregnancy:   · Get plenty of rest   You may feel more tired than normal  You may need to take naps or go to bed earlier  · Manage nausea and vomiting  Avoid fatty and spicy foods  Eat small meals throughout the day instead of large meals  Aniya may help to decrease nausea  Ask your healthcare provider about other ways of decreasing nausea and vomiting  · Eat a variety of healthy foods  Healthy foods include fruits, vegetables, whole-grain breads, low-fat dairy foods, beans, lean meats, and fish  Drink liquids as directed  Ask how much liquid to drink each day and which liquids are best for you  Limit caffeine to less than 200 milligrams each day  Limit your intake of fish to 2 servings each week  Choose fish low in mercury such as canned light tuna, shrimp, salmon, cod, or tilapia   Do not  eat fish high in mercury such as swordfish, tilefish, sharon mackerel, and shark  · Take prenatal vitamins as directed  Your need for certain vitamins and minerals, such as folic acid, increases during pregnancy  Prenatal vitamins provide some of the extra vitamins and minerals you need  Prenatal vitamins may also help to decrease the risk of certain birth defects  · Do not smoke  If you smoke, it is never too late to quit  Smoking increases your risk of a miscarriage and other health problems during your pregnancy  Smoking can cause your baby to be born too early or weigh less at birth  Ask your healthcare provider for information if you need help quitting  · Do not drink alcohol  Alcohol passes from your body to your baby through the placenta  It can affect your baby's brain development and cause fetal alcohol syndrome (FAS)  FAS is a group of conditions that causes mental, behavior, and growth problems  · Talk to your healthcare provider before you take any medicines  Many medicines may harm your baby if you take them when you are pregnant  Do not take any medicines, vitamins, herbs, or supplements without first talking to your healthcare provider  Never use illegal or street drugs (such as marijuana or cocaine) while you are pregnant  Safety tips during pregnancy:   · Avoid hot tubs and saunas  Do not use a hot tub or sauna while you are pregnant, especially during your first trimester  Hot tubs and saunas may raise your baby's temperature and increase the risk of birth defects  · Avoid toxoplasmosis  This is an infection caused by eating raw meat or being around infected cat feces  It can cause birth defects, miscarriages, and other problems  Wash your hands after you touch raw meat  Make sure any meat is well-cooked before you eat it  Avoid raw eggs and unpasteurized milk  Use gloves or ask someone else to clean your cat's litter box while you are pregnant    Changes that are happening with your baby: Your baby has fully formed fingernails and toenails  Your baby's heartbeat can now be heard  Ask your healthcare provider if you can listen to your baby's heartbeat  By week 14, your baby is over 4 inches long from the top of the head to the rump (baby's bottom)  Your baby weighs over 3 ounces  What you need to know about prenatal care:  During the first 28 weeks of your pregnancy, you will see your healthcare provider once a month  Prenatal care can help prevent problems during pregnancy and childbirth  Your healthcare provider will check your blood pressure and weight  You may also need any of the following:  · A urine test  may also be done to check for sugar and protein  These can be signs of gestational diabetes or infection  · Genetic disorders screening tests  may be offered to you  This screening test checks your baby's risk of genetic disorders such as Down syndrome  The screening test includes a blood test and ultrasound  · Your baby's heart rate  will be checked  © 2017 [employer]  Information is for End User's use only and may not be sold, redistributed or otherwise used for commercial purposes  All illustrations and images included in CareNotes® are the copyrighted property of [employer] M [employer]  or Luis Garcia  The above information is an  only  It is not intended as medical advice for individual conditions or treatments  Talk to your doctor, nurse or pharmacist before following any medical regimen to see if it is safe and effective for you

## 2020-08-20 LAB
ABO GROUP BLD: NORMAL
APPEARANCE UR: CLEAR
BACTERIA UR QL AUTO: NORMAL /HPF
BASOPHILS # BLD AUTO: 30 CELLS/UL (ref 0–200)
BASOPHILS NFR BLD AUTO: 0.8 %
BILIRUB UR QL STRIP: NEGATIVE
BLD GP AB SCN SERPL QL: NORMAL
COLOR UR: YELLOW
EOSINOPHIL # BLD AUTO: 49 CELLS/UL (ref 15–500)
EOSINOPHIL NFR BLD AUTO: 1.3 %
ERYTHROCYTE [DISTWIDTH] IN BLOOD BY AUTOMATED COUNT: 12.8 % (ref 11–15)
GLUCOSE 1H P 50 G GLC PO SERPL-MCNC: 86 MG/DL
GLUCOSE UR QL STRIP: NEGATIVE
HBV SURFACE AG SERPL QL IA: NORMAL
HCT VFR BLD AUTO: 37.9 % (ref 35–45)
HGB BLD-MCNC: 12.3 G/DL (ref 11.7–15.5)
HGB UR QL STRIP: NEGATIVE
HIV 1+2 AB+HIV1 P24 AG SERPL QL IA: NORMAL
HYALINE CASTS #/AREA URNS LPF: NORMAL /LPF
KETONES UR QL STRIP: NEGATIVE
LEUKOCYTE ESTERASE UR QL STRIP: NEGATIVE
LYMPHOCYTES # BLD AUTO: 1592 CELLS/UL (ref 850–3900)
LYMPHOCYTES NFR BLD AUTO: 41.9 %
MCH RBC QN AUTO: 29.4 PG (ref 27–33)
MCHC RBC AUTO-ENTMCNC: 32.5 G/DL (ref 32–36)
MCV RBC AUTO: 90.7 FL (ref 80–100)
MONOCYTES # BLD AUTO: 228 CELLS/UL (ref 200–950)
MONOCYTES NFR BLD AUTO: 6 %
NEUTROPHILS # BLD AUTO: 1900 CELLS/UL (ref 1500–7800)
NEUTROPHILS NFR BLD AUTO: 50 %
NITRITE UR QL STRIP: NEGATIVE
PH UR STRIP: 7 [PH] (ref 5–8)
PLATELET # BLD AUTO: 312 THOUSAND/UL (ref 140–400)
PMV BLD REES-ECKER: 9.7 FL (ref 7.5–12.5)
PROT UR QL STRIP: NEGATIVE
RBC # BLD AUTO: 4.18 MILLION/UL (ref 3.8–5.1)
RBC #/AREA URNS HPF: NORMAL /HPF
RH BLD: NORMAL
RPR SER QL: NORMAL
RUBV IGG SERPL IA-ACNC: 8.98 INDEX
SP GR UR STRIP: 1.02 (ref 1–1.03)
SQUAMOUS #/AREA URNS HPF: NORMAL /HPF
WBC # BLD AUTO: 3.8 THOUSAND/UL (ref 3.8–10.8)
WBC #/AREA URNS HPF: NORMAL /HPF

## 2020-08-24 ENCOUNTER — INITIAL PRENATAL (OUTPATIENT)
Dept: OBGYN CLINIC | Facility: MEDICAL CENTER | Age: 35
End: 2020-08-24

## 2020-08-24 DIAGNOSIS — Z34.91 ENCOUNTER FOR PREGNANCY RELATED EXAMINATION IN FIRST TRIMESTER: Primary | ICD-10-CM

## 2020-08-24 PROCEDURE — OBC: Performed by: OBSTETRICS & GYNECOLOGY

## 2020-08-24 NOTE — PROGRESS NOTES
OB INTAKE INTERVIEW      Pt presents for OB intake    OB History    Para Term  AB Living   1 0 0 0 0 0   SAB TAB Ectopic Multiple Live Births   0 0 0 0 0      # Outcome Date GA Lbr Mikey/2nd Weight Sex Delivery Anes PTL Lv   1 Current                  Hx of  delivery prior to 36 weeks 6 days:  NO   Last Menstrual Period:   Patient's last menstrual period was 2020 (exact date)  Ultrasound date:   20 7 weeks 6 days  Estimated date of delivery:   Estimated Date of Delivery:2021  confirmed by LMP? History of Diabetes: NO  History of Hypertension:  NO      Infection Screening: Does the pt have a hx of MRSA? NO    H&P visit scheduled  ?  Interview education  Information on   Lu[facility] Pregnancy Essentials reviewed  Handouts given: Baby and Me support center  [facility]un[facility] in Pregnancy information sheet   COVID-19: precautions and travel restrictions reviewed    Interview education    [facility]  Discussed genetic testing-    - pt has NT scan scheduled  Information on CF and SMA carrier screening reviewed-will let office know at next appointment if screening is destred              Discussed Tdap and Influenza vaccines         Depression Screening Follow-up Plan: Patient's depression screening was Negative  with an [place] score of  6                  The patient was oriented to our practice and all questions were answered    Interviewed by: Demi Fuentes RN 20

## 2020-09-02 ENCOUNTER — TELEPHONE (OUTPATIENT)
Dept: PERINATAL CARE | Facility: OTHER | Age: 35
End: 2020-09-02

## 2020-09-02 NOTE — TELEPHONE ENCOUNTER
Spoke with patient and confirmed appointment with MFM  1 support person (must be over age of 15) may accompany patient  Will you and your support person be able to wear a mask, without a valve, during entire appointment? yes  To minimize your exposure in our waiting area,check in and rooming questions will be done via phone  When you arrive in the parking lot please call the following inside line # prior to entering office:    [facility] [place]: [phone]    Have you or your support person traveled outside the state in the last 2 weeks? No   If yes, what state did you travel to? n/a     Do you or your support person have:  Fever or flu-like symptoms? No  Symptoms of upper respiratory infection like runny nose, sore throat or cough? No  Do you have new headache that you have not had in the past? No  Have you experienced any new shortness of breath recently? No  Do you have any new loss of taste or smell? No  Do you have any new diarrhea, nausea or vomiting? No  Have you recently been in contact with anyone who has been sick or diagnosed with COVID19 infection? No  Have you been recommended to quarantine because of an exposure to a confirmed positive COVID19 person? No  You and your support person will have temperature screening upon arrival     Patient verbalized understanding of all instructions

## 2020-09-03 ENCOUNTER — ROUTINE PRENATAL (OUTPATIENT)
Dept: PERINATAL CARE | Facility: CLINIC | Age: 35
End: 2020-09-03
Payer: COMMERCIAL

## 2020-09-03 VITALS
HEART RATE: 92 BPM | HEIGHT: 66 IN | WEIGHT: 236.4 LBS | SYSTOLIC BLOOD PRESSURE: 126 MMHG | BODY MASS INDEX: 37.99 KG/M2 | DIASTOLIC BLOOD PRESSURE: 75 MMHG | TEMPERATURE: 98.4 F

## 2020-09-03 DIAGNOSIS — O99.211 OBESITY AFFECTING PREGNANCY IN FIRST TRIMESTER: Primary | ICD-10-CM

## 2020-09-03 DIAGNOSIS — D25.9 UTERINE FIBROID DURING PREGNANCY, ANTEPARTUM: ICD-10-CM

## 2020-09-03 DIAGNOSIS — O34.10 UTERINE FIBROID DURING PREGNANCY, ANTEPARTUM: ICD-10-CM

## 2020-09-03 DIAGNOSIS — Z36.82 NUCHAL TRANSLUCENCY OF FETUS ON PRENATAL ULTRASOUND: ICD-10-CM

## 2020-09-03 DIAGNOSIS — Z3A.12 12 WEEKS GESTATION OF PREGNANCY: ICD-10-CM

## 2020-09-03 PROCEDURE — 76801 OB US < 14 WKS SINGLE FETUS: CPT | Performed by: OBSTETRICS & GYNECOLOGY

## 2020-09-03 PROCEDURE — 76813 OB US NUCHAL MEAS 1 GEST: CPT | Performed by: OBSTETRICS & GYNECOLOGY

## 2020-09-03 PROCEDURE — 99241 PR OFFICE CONSULTATION NEW/ESTAB PATIENT 15 MIN: CPT | Performed by: OBSTETRICS & GYNECOLOGY

## 2020-09-03 RX ORDER — LORATADINE 10 MG/1
10 TABLET ORAL DAILY
COMMUNITY

## 2020-09-03 NOTE — LETTER
September 3, 2020     Shannan Mercer MD  [facility]    Patient: Ronak Londono   YOB: 1985   Date of Visit: 9/3/2020     Dear Dr Jama Shelton      Thank you for referring Ronak Londono to me for evaluation  Below are the relevant portions of my assessment and plan of care  If you have questions, please do not hesitate to call me  I look forward to following [facility] along with you           Sincerely,        Juan Manuel Lam MD        CC: No Recipients    Progress Notes:

## 2020-09-03 NOTE — PROGRESS NOTES
Sergo Lao    Dear Dr Georgiana Campos     Thank you for requesting a  consultation on your patient Ms Latesha Pandey for the following indications: sequential screen    History Ambrosio Hawkins is here today with her partner:Rodolfo  She reports she is on prenatal vitamins and is on Claritin for allergies  She has no known allergies to medication  She reports a history of gastric reflux and was told that she has a history of a fibroid  Surgical history includes a tonsillectomy, ankle surgery and removal of wisdom teeth  Family history includes fibroids in her mother  She denies any use of cigarettes, alcohol or illicit drugs  Gerhardt Sep Ultrasound findings:  Ultrasound shows a fetus that is growing concordant with her dates  The nasal bone and nuchal translucency appear normal   No malformations are detected on today's early ultrasound  She has a posterior fundal fibroid measuring 3 24 x 2 7 centimeters  This fibroid is not near her placenta and is intramural      The patient was informed of the findings and counseled about the limitations of the exam in detecting all forms of fetal congenital abnormalities  She denies any vaginal bleeding or uterine cramping or contractions  Today's ultrasound findings and suggested follow-up were discussed in detail with the patient  The Sequential Screen was discussed in detail, including the sensitivities for detection of Down syndrome, Trisomy 18, and open neural tube defects  The patient underwent fingerstick blood collection for hCG and LOUIE-A to complete the initial component of the Sequential Screen  Results should be available within one week  Follow up recommended:   1  Follow-up multiple marker serum screening at 16-18 weeks' gestation is recommended to complete the Sequential Screen  2  Fetal Level II ultrasound imaging is recommended at 19-20 weeks' gestation    3  At this time Ambrosio Hawkins is aware that she has only 1 fibroid that we see that is less than 5 centimeters and is not near the placenta which is reassuring  She is considering having 2 children  I recommend spacing them close together but at least 1 year apart due to the following risks with large fibroids  Large fibroids >5 cms are associated with an increased risk of a short cervix, PPROM, delivery at an earlier gestational age, post partum hemorrhage and need for transfusion  In one study, PPROM occurred in 100% of women with 3 fibroids > 5 cms, 25% of women with 2 fibroids >5cms and in 8 3% of women with at least 1 fibroid > 5 cms in size  Women with a fibroid > 5 cms will on average deliver by 36 5 weeks  Women with large fibroids have a 12 2 % risk for a post partum hemorrhage(PPH) and 12 2% need for a transfusion compared with a 3 2% risk of PPH and 0% need for a transfusion in women without fibroids  4  Obesity in pregnancy -BMI above 30 confers increased pregnancy risks  Maternal risks include preeclampsia, gestational diabetes, cardiac dysfunction, and sleep apnea  Fetal risks include miscarriage, fetal anomalies, and stillbirth as well as macrosomia and impaired growth  Intrapartum risks include  delivery, wound complications, and venous thrombosis  The  detection of congenital anomalies is reduced with increasing maternal BMI  Strategies to limit weight gain to the [employer] guidelines, which for BMI>30 is 11-20 pounds, include dietary control, exercise, and behavior modification  [facility] recommends 150 minutes per week of aerobic exercise in pregnancy, which could be divided into 30 minutes daily, five times per week  Baby aspirin 162 milligrams orally taken till delivery has been associated with a lower risk of developing preeclampsia in pregnancy         The face to face time, in addition to time spent discussing ultrasound results, was approximately 15 minutes, greater than 50% of which was spent during counseling and coordination of care    Corey Amaral MD

## 2020-09-10 LAB
# FETUSES US: 1
AGE: NORMAL
B-HCG ADJ MOM SERPL: 1.61
B-HCG SERPL-ACNC: 94.6 IU/ML
COLLECT DATE: NORMAL
CURRENT SMOKER: NO
DONATED EGG PATIENT QL: NO
FET CRL US.MEAS: 70 MM
FET CRL US.MEAS: NORMAL MM
FET NUCHAL FOLD MOM THICKNESS US.MEAS: 0.98
FET NUCHAL FOLD THICKNESS US.MEAS: 1.6 MM
FET NUCHAL FOLD THICKNESS US.MEAS: NORMAL MM
FET TS 21 RISK FROM MAT AGE: NORMAL
GA CLIN EST: 13.1 WK
GA METHOD: NORMAL
HX OF NTD NARR: NO
HX OF TRISOMY 21 NARR: NO
IDDM PATIENT QL: NO
INTEGRATED SCN PATIENT-IMP: NORMAL
PAPP-A MOM SERPL: 2.26
PAPP-A SERPL-MCNC: 1137.7 NG/ML
PHYSICIAN NPI: NORMAL
SL AMB NASAL BONE: PRESENT
SL AMB NTQR LOCATION ID#: NORMAL
SL AMB NTQR READING PHYS ID#: NORMAL
SL AMB REFERRING PHYSICIAN NAME: NORMAL
SL AMB REFERRING PHYSICIAN PHONE: NORMAL
SL AMB REPEAT SPECIMEN: NO
SL AMB TWIN B NASAL BONE: NORMAL
SONOGRAPHER NAME: NORMAL
SONOGRAPHER: NORMAL
SONOGRAPHER: NORMAL
TS 18 RISK FETUS: NORMAL
TS 21 RISK FETUS: NORMAL
US DATE: NORMAL
US FETUSES STUDY [IMP]: NORMAL

## 2020-09-14 ENCOUNTER — TELEPHONE (OUTPATIENT)
Dept: PERINATAL CARE | Facility: CLINIC | Age: 35
End: 2020-09-14

## 2020-09-14 NOTE — TELEPHONE ENCOUNTER
----- Message from Milly Gusman MD sent at 9/11/2020 10:54 PM EDT -----  Patient updated with her lab results through my chart

## 2020-09-14 NOTE — TELEPHONE ENCOUNTER
----- Message from Yajaira Campos MD sent at 9/11/2020 10:54 PM EDT -----  Patient updated with her lab results through my chart

## 2020-09-14 NOTE — LETTER
09/14/20  Rhona Hadley  1985    Thank you for completing Part 1 of your Sequential Screen  To obtain a complete test result, please complete blood work for Part 2 Sequential Screen between the weeks of 9/13 to 9/30  Based on your insurance coverage, please use one of the following locations  The other option is to go to [email or web address]  Call our office for any questions at [phone]          Sincerely,    Jovi Chopra RN

## 2020-09-14 NOTE — TELEPHONE ENCOUNTER
Left voice mail message at number provided on communication consent  Result of [facility] Part 1 Sequential Screen given and Part 2 instructions explained  Patient to call MFM nurse line for any questions, number provided  #[phone]  TRF and lab instruction letter mailed

## 2020-09-17 ENCOUNTER — INITIAL PRENATAL (OUTPATIENT)
Dept: OBGYN CLINIC | Facility: MEDICAL CENTER | Age: 35
End: 2020-09-17

## 2020-09-17 VITALS — BODY MASS INDEX: 22.47 KG/M2 | SYSTOLIC BLOOD PRESSURE: 104 MMHG | DIASTOLIC BLOOD PRESSURE: 78 MMHG | WEIGHT: 139.2 LBS

## 2020-09-17 DIAGNOSIS — Z3A.14 14 WEEKS GESTATION OF PREGNANCY: Primary | ICD-10-CM

## 2020-09-17 DIAGNOSIS — D25.9 UTERINE FIBROID DURING PREGNANCY, ANTEPARTUM: ICD-10-CM

## 2020-09-17 DIAGNOSIS — O34.10 UTERINE FIBROID DURING PREGNANCY, ANTEPARTUM: ICD-10-CM

## 2020-09-17 DIAGNOSIS — Z13.71 SCREENING FOR GENETIC DISEASE CARRIER STATUS: ICD-10-CM

## 2020-09-17 PROCEDURE — PNV: Performed by: ADVANCED PRACTICE MIDWIFE

## 2020-09-17 NOTE — PROGRESS NOTES
Rosendo Friend is a 37y o  year old  at 14w6d for first prenatal visit  Pregnancy was desired  She is currently taking PNV    Nausea No Vomiting No   Exam done today - see OB flowsheet  Pap done No  Gonorrhea and Chlamydia sent  Labs reviewed    Genetic testing Given script for SMA and CF today  Had first part of sequential screen done  OB complications Obesity, Intramural Posterior Fundal fibroid  Pt has been counseled re diet, exercise, weight gain, foods to avoid, vaccines in pregnancy, trisomy screening, travel precautions to include seat belt use and VTE risk reduction  She has been provided our pregnancy packet which includes how and when to contact providers, medication recommendations, dietary suggestions, breastfeeding information as well as websites for additional information, hospital and delivery concerns

## 2020-09-18 LAB
C TRACH RRNA SPEC QL NAA+PROBE: NOT DETECTED
N GONORRHOEA RRNA SPEC QL NAA+PROBE: NOT DETECTED

## 2020-09-29 LAB
# FETUSES US: 1
AFP ADJ MOM SERPL: 1.03
AFP SERPL-MCNC: 27.2 NG/ML
B-HCG ADJ MOM SERPL: 1.44
B-HCG SERPL-ACNC: 36.5 IU/ML
COLLECT DATE: NORMAL
CURRENT SMOKER: NO
FET CRL US.MEAS: 70 MM
FET NUCHAL FOLD MOM THICKNESS US.MEAS: 0.98
FET NUCHAL FOLD THICKNESS US.MEAS: 1.6 MM
FET TS 21 RISK FROM MAT AGE: NORMAL
GA CLIN EST: 16.1 WK
HX OF NTD NARR: NORMAL
IDDM PATIENT QL: NORMAL
INHIBIN A ADJ MOM SERPL: 1.51
INHIBIN A SERPL-MCNC: 210 PG/ML
INTEGRATED SCN PATIENT-IMP: NORMAL
NEURAL TUBE DEFECT RISK FETUS: NORMAL %
PAPP-A MOM SERPL: 2.26
PAPP-A SERPL-MCNC: 1137.7 NG/ML
PHYSICIAN NPI: NORMAL
SL AMB NASAL BONE: PRESENT
SL AMB REFERRING PHYSICIAN NAME: NORMAL
SL AMB REFERRING PHYSICIAN PHONE: NORMAL
SL AMB REPEAT SPECIMEN: NORMAL
SL AMB SPECIMEN # FROM PART 1: NORMAL
SL AMB TWIN B NASAL BONE: NORMAL
TS 18 RISK FETUS: NORMAL
TS 21 RISK FETUS: NORMAL
U ESTRIOL ADJ MOM SERPL: 1.94
U ESTRIOL SERPL-MCNC: 1.37 NG/ML
US DATE: NORMAL

## 2020-10-02 LAB
CFTR MUT ANL BLD/T: NEGATIVE
CFTR MUT ANL BLD/T: NORMAL
CFTR MUT TESTED BLD/T: NORMAL
MICRODELETION SYND BLD/T FISH: NORMAL
REF LAB TEST METHOD: NORMAL
REF LAB TEST RESULTS: NEGATIVE
SERVICE CMNT-IMP: NORMAL
SMN1 GENE MUT ANL BLD/T: NORMAL
SMN2 GENE MUT ANL BLD/T: NORMAL

## 2020-10-05 ENCOUNTER — ROUTINE PRENATAL (OUTPATIENT)
Dept: OBGYN CLINIC | Facility: MEDICAL CENTER | Age: 35
End: 2020-10-05
Payer: COMMERCIAL

## 2020-10-05 VITALS — WEIGHT: 244 LBS | DIASTOLIC BLOOD PRESSURE: 66 MMHG | BODY MASS INDEX: 39.38 KG/M2 | SYSTOLIC BLOOD PRESSURE: 104 MMHG

## 2020-10-05 DIAGNOSIS — D25.9 UTERINE FIBROID DURING PREGNANCY, ANTEPARTUM: ICD-10-CM

## 2020-10-05 DIAGNOSIS — Z23 NEED FOR INFLUENZA VACCINATION: Primary | ICD-10-CM

## 2020-10-05 DIAGNOSIS — Z3A.17 17 WEEKS GESTATION OF PREGNANCY: ICD-10-CM

## 2020-10-05 DIAGNOSIS — O34.10 UTERINE FIBROID DURING PREGNANCY, ANTEPARTUM: ICD-10-CM

## 2020-10-05 PROCEDURE — PNV: Performed by: NURSE PRACTITIONER

## 2020-10-05 PROCEDURE — 90471 IMMUNIZATION ADMIN: CPT

## 2020-10-05 PROCEDURE — 90686 IIV4 VACC NO PRSV 0.5 ML IM: CPT

## 2020-10-06 ENCOUNTER — ROUTINE PRENATAL (OUTPATIENT)
Dept: OBGYN CLINIC | Facility: MEDICAL CENTER | Age: 35
End: 2020-10-06
Payer: COMMERCIAL

## 2020-10-06 ENCOUNTER — TELEPHONE (OUTPATIENT)
Dept: OBGYN CLINIC | Facility: MEDICAL CENTER | Age: 35
End: 2020-10-06

## 2020-10-06 VITALS
SYSTOLIC BLOOD PRESSURE: 100 MMHG | WEIGHT: 242.6 LBS | TEMPERATURE: 97.4 F | BODY MASS INDEX: 39.16 KG/M2 | DIASTOLIC BLOOD PRESSURE: 70 MMHG

## 2020-10-06 DIAGNOSIS — O99.891 BACK PAIN AFFECTING PREGNANCY IN SECOND TRIMESTER: Primary | ICD-10-CM

## 2020-10-06 DIAGNOSIS — M54.9 BACK PAIN AFFECTING PREGNANCY IN SECOND TRIMESTER: Primary | ICD-10-CM

## 2020-10-06 PROCEDURE — 76817 TRANSVAGINAL US OBSTETRIC: CPT | Performed by: STUDENT IN AN ORGANIZED HEALTH CARE EDUCATION/TRAINING PROGRAM

## 2020-10-06 PROCEDURE — 99213 OFFICE O/P EST LOW 20 MIN: CPT | Performed by: STUDENT IN AN ORGANIZED HEALTH CARE EDUCATION/TRAINING PROGRAM

## 2020-10-07 LAB
APPEARANCE UR: CLEAR
BACTERIA UR QL AUTO: NORMAL /HPF
BILIRUB UR QL STRIP: NEGATIVE
COLOR UR: YELLOW
GLUCOSE UR QL STRIP: NEGATIVE
HGB UR QL STRIP: NEGATIVE
HYALINE CASTS #/AREA URNS LPF: NORMAL /LPF
KETONES UR QL STRIP: NEGATIVE
LEUKOCYTE ESTERASE UR QL STRIP: NEGATIVE
NITRITE UR QL STRIP: NEGATIVE
PH UR STRIP: 5.5 [PH] (ref 5–8)
PROT UR QL STRIP: NEGATIVE
RBC #/AREA URNS HPF: NORMAL /HPF
SP GR UR STRIP: 1.01 (ref 1–1.03)
SQUAMOUS #/AREA URNS HPF: NORMAL /HPF
WBC #/AREA URNS HPF: NORMAL /HPF

## 2020-10-28 ENCOUNTER — TELEPHONE (OUTPATIENT)
Dept: PERINATAL CARE | Facility: CLINIC | Age: 35
End: 2020-10-28

## 2020-10-29 ENCOUNTER — ROUTINE PRENATAL (OUTPATIENT)
Dept: PERINATAL CARE | Facility: CLINIC | Age: 35
End: 2020-10-29
Payer: COMMERCIAL

## 2020-10-29 VITALS
SYSTOLIC BLOOD PRESSURE: 126 MMHG | TEMPERATURE: 97.3 F | BODY MASS INDEX: 40.31 KG/M2 | DIASTOLIC BLOOD PRESSURE: 65 MMHG | HEIGHT: 66 IN | WEIGHT: 250.8 LBS | HEART RATE: 72 BPM

## 2020-10-29 DIAGNOSIS — Z3A.20 20 WEEKS GESTATION OF PREGNANCY: Primary | ICD-10-CM

## 2020-10-29 DIAGNOSIS — O99.212 OBESITY AFFECTING PREGNANCY IN SECOND TRIMESTER: ICD-10-CM

## 2020-10-29 PROCEDURE — 76817 TRANSVAGINAL US OBSTETRIC: CPT | Performed by: OBSTETRICS & GYNECOLOGY

## 2020-10-29 PROCEDURE — 76811 OB US DETAILED SNGL FETUS: CPT | Performed by: OBSTETRICS & GYNECOLOGY

## 2020-11-03 ENCOUNTER — ROUTINE PRENATAL (OUTPATIENT)
Dept: OBGYN CLINIC | Facility: MEDICAL CENTER | Age: 35
End: 2020-11-03

## 2020-11-03 VITALS — SYSTOLIC BLOOD PRESSURE: 110 MMHG | BODY MASS INDEX: 40.93 KG/M2 | DIASTOLIC BLOOD PRESSURE: 80 MMHG | WEIGHT: 253.6 LBS

## 2020-11-03 DIAGNOSIS — O99.891 BACK PAIN AFFECTING PREGNANCY IN SECOND TRIMESTER: ICD-10-CM

## 2020-11-03 DIAGNOSIS — M54.9 BACK PAIN AFFECTING PREGNANCY IN SECOND TRIMESTER: ICD-10-CM

## 2020-11-03 DIAGNOSIS — O26.899 PELVIC PAIN IN PREGNANCY: ICD-10-CM

## 2020-11-03 DIAGNOSIS — O99.212 OBESITY AFFECTING PREGNANCY IN SECOND TRIMESTER: ICD-10-CM

## 2020-11-03 DIAGNOSIS — R10.2 PELVIC PAIN IN PREGNANCY: ICD-10-CM

## 2020-11-03 DIAGNOSIS — Z3A.21 21 WEEKS GESTATION OF PREGNANCY: Primary | ICD-10-CM

## 2020-11-03 PROCEDURE — PNV: Performed by: STUDENT IN AN ORGANIZED HEALTH CARE EDUCATION/TRAINING PROGRAM

## 2020-11-09 ENCOUNTER — EVALUATION (OUTPATIENT)
Dept: PHYSICAL THERAPY | Facility: CLINIC | Age: 35
End: 2020-11-09
Payer: COMMERCIAL

## 2020-11-09 DIAGNOSIS — O99.891 BACK PAIN AFFECTING PREGNANCY IN SECOND TRIMESTER: ICD-10-CM

## 2020-11-09 DIAGNOSIS — M54.9 BACK PAIN AFFECTING PREGNANCY IN SECOND TRIMESTER: ICD-10-CM

## 2020-11-09 DIAGNOSIS — R10.2 PELVIC PAIN IN PREGNANCY: ICD-10-CM

## 2020-11-09 DIAGNOSIS — O26.899 PELVIC PAIN IN PREGNANCY: ICD-10-CM

## 2020-11-09 PROCEDURE — 97162 PT EVAL MOD COMPLEX 30 MIN: CPT | Performed by: PHYSICAL THERAPIST

## 2020-11-09 PROCEDURE — 97112 NEUROMUSCULAR REEDUCATION: CPT | Performed by: PHYSICAL THERAPIST

## 2020-11-12 ENCOUNTER — OFFICE VISIT (OUTPATIENT)
Dept: PHYSICAL THERAPY | Facility: CLINIC | Age: 35
End: 2020-11-12
Payer: COMMERCIAL

## 2020-11-12 DIAGNOSIS — O26.899 PELVIC PAIN IN PREGNANCY: Primary | ICD-10-CM

## 2020-11-12 DIAGNOSIS — R10.2 PELVIC PAIN IN PREGNANCY: Primary | ICD-10-CM

## 2020-11-12 DIAGNOSIS — O99.891 BACK PAIN AFFECTING PREGNANCY IN SECOND TRIMESTER: ICD-10-CM

## 2020-11-12 DIAGNOSIS — M54.9 BACK PAIN AFFECTING PREGNANCY IN SECOND TRIMESTER: ICD-10-CM

## 2020-11-12 PROCEDURE — 97110 THERAPEUTIC EXERCISES: CPT | Performed by: PHYSICAL THERAPIST

## 2020-11-12 PROCEDURE — 97140 MANUAL THERAPY 1/> REGIONS: CPT | Performed by: PHYSICAL THERAPIST

## 2020-11-12 PROCEDURE — 97530 THERAPEUTIC ACTIVITIES: CPT | Performed by: PHYSICAL THERAPIST

## 2020-11-16 ENCOUNTER — OFFICE VISIT (OUTPATIENT)
Dept: PHYSICAL THERAPY | Facility: CLINIC | Age: 35
End: 2020-11-16
Payer: COMMERCIAL

## 2020-11-16 DIAGNOSIS — O99.891 BACK PAIN AFFECTING PREGNANCY IN SECOND TRIMESTER: ICD-10-CM

## 2020-11-16 DIAGNOSIS — R10.2 PELVIC PAIN IN PREGNANCY: Primary | ICD-10-CM

## 2020-11-16 DIAGNOSIS — M54.9 BACK PAIN AFFECTING PREGNANCY IN SECOND TRIMESTER: ICD-10-CM

## 2020-11-16 DIAGNOSIS — O26.899 PELVIC PAIN IN PREGNANCY: Primary | ICD-10-CM

## 2020-11-16 PROCEDURE — 97140 MANUAL THERAPY 1/> REGIONS: CPT | Performed by: PHYSICAL THERAPIST

## 2020-11-16 PROCEDURE — 97530 THERAPEUTIC ACTIVITIES: CPT | Performed by: PHYSICAL THERAPIST

## 2020-11-16 PROCEDURE — 97110 THERAPEUTIC EXERCISES: CPT | Performed by: PHYSICAL THERAPIST

## 2020-11-18 ENCOUNTER — TELEPHONE (OUTPATIENT)
Dept: PERINATAL CARE | Facility: OTHER | Age: 35
End: 2020-11-18

## 2020-11-19 ENCOUNTER — APPOINTMENT (OUTPATIENT)
Dept: PHYSICAL THERAPY | Facility: CLINIC | Age: 35
End: 2020-11-19
Payer: COMMERCIAL

## 2020-11-19 ENCOUNTER — ULTRASOUND (OUTPATIENT)
Dept: PERINATAL CARE | Facility: OTHER | Age: 35
End: 2020-11-19
Payer: COMMERCIAL

## 2020-11-19 VITALS
TEMPERATURE: 97.3 F | DIASTOLIC BLOOD PRESSURE: 79 MMHG | SYSTOLIC BLOOD PRESSURE: 118 MMHG | HEART RATE: 88 BPM | BODY MASS INDEX: 41.43 KG/M2 | WEIGHT: 257.8 LBS | HEIGHT: 66 IN

## 2020-11-19 DIAGNOSIS — D25.9 UTERINE FIBROID DURING PREGNANCY, ANTEPARTUM: ICD-10-CM

## 2020-11-19 DIAGNOSIS — Z3A.23 23 WEEKS GESTATION OF PREGNANCY: ICD-10-CM

## 2020-11-19 DIAGNOSIS — O99.212 OBESITY AFFECTING PREGNANCY IN SECOND TRIMESTER: Primary | ICD-10-CM

## 2020-11-19 DIAGNOSIS — O34.10 UTERINE FIBROID DURING PREGNANCY, ANTEPARTUM: ICD-10-CM

## 2020-11-19 PROCEDURE — 76816 OB US FOLLOW-UP PER FETUS: CPT | Performed by: OBSTETRICS & GYNECOLOGY

## 2020-11-20 ENCOUNTER — TELEPHONE (OUTPATIENT)
Dept: OBGYN CLINIC | Facility: MEDICAL CENTER | Age: 35
End: 2020-11-20

## 2020-11-20 ENCOUNTER — ROUTINE PRENATAL (OUTPATIENT)
Dept: OBGYN CLINIC | Facility: MEDICAL CENTER | Age: 35
End: 2020-11-20
Payer: COMMERCIAL

## 2020-11-20 VITALS — DIASTOLIC BLOOD PRESSURE: 70 MMHG | BODY MASS INDEX: 41.48 KG/M2 | WEIGHT: 257 LBS | SYSTOLIC BLOOD PRESSURE: 120 MMHG

## 2020-11-20 DIAGNOSIS — R39.9 UTI SYMPTOMS: ICD-10-CM

## 2020-11-20 DIAGNOSIS — Z3A.24 24 WEEKS GESTATION OF PREGNANCY: ICD-10-CM

## 2020-11-20 DIAGNOSIS — Z34.02 ENCOUNTER FOR SUPERVISION OF NORMAL FIRST PREGNANCY IN SECOND TRIMESTER: Primary | ICD-10-CM

## 2020-11-20 LAB
SL AMB  POCT GLUCOSE, UA: NEGATIVE
SL AMB LEUKOCYTE ESTERASE,UA: NEGATIVE
SL AMB POCT BILIRUBIN,UA: NEGATIVE
SL AMB POCT BLOOD,UA: NEGATIVE
SL AMB POCT CLARITY,UA: NORMAL
SL AMB POCT COLOR,UA: YELLOW
SL AMB POCT KETONES,UA: NEGATIVE
SL AMB POCT NITRITE,UA: NEGATIVE
SL AMB POCT PH,UA: NEGATIVE
SL AMB POCT SPECIFIC GRAVITY,UA: 1
SL AMB POCT URINE PROTEIN: NEGATIVE
SL AMB POCT UROBILINOGEN: NEGATIVE

## 2020-11-20 PROCEDURE — 87086 URINE CULTURE/COLONY COUNT: CPT | Performed by: OBSTETRICS & GYNECOLOGY

## 2020-11-20 PROCEDURE — PNV: Performed by: OBSTETRICS & GYNECOLOGY

## 2020-11-20 PROCEDURE — 81002 URINALYSIS NONAUTO W/O SCOPE: CPT | Performed by: OBSTETRICS & GYNECOLOGY

## 2020-11-21 LAB — BACTERIA UR CULT: NORMAL

## 2020-11-24 ENCOUNTER — OFFICE VISIT (OUTPATIENT)
Dept: PHYSICAL THERAPY | Facility: CLINIC | Age: 35
End: 2020-11-24
Payer: COMMERCIAL

## 2020-11-24 DIAGNOSIS — M54.9 BACK PAIN AFFECTING PREGNANCY IN SECOND TRIMESTER: ICD-10-CM

## 2020-11-24 DIAGNOSIS — O26.899 PELVIC PAIN IN PREGNANCY: Primary | ICD-10-CM

## 2020-11-24 DIAGNOSIS — O99.891 BACK PAIN AFFECTING PREGNANCY IN SECOND TRIMESTER: ICD-10-CM

## 2020-11-24 DIAGNOSIS — R10.2 PELVIC PAIN IN PREGNANCY: Primary | ICD-10-CM

## 2020-11-24 PROCEDURE — 97530 THERAPEUTIC ACTIVITIES: CPT | Performed by: PHYSICAL THERAPIST

## 2020-11-24 PROCEDURE — 97140 MANUAL THERAPY 1/> REGIONS: CPT | Performed by: PHYSICAL THERAPIST

## 2020-11-24 PROCEDURE — 97110 THERAPEUTIC EXERCISES: CPT | Performed by: PHYSICAL THERAPIST

## 2020-11-30 ENCOUNTER — APPOINTMENT (OUTPATIENT)
Dept: PHYSICAL THERAPY | Facility: CLINIC | Age: 35
End: 2020-11-30
Payer: COMMERCIAL

## 2020-12-01 ENCOUNTER — ROUTINE PRENATAL (OUTPATIENT)
Dept: OBGYN CLINIC | Facility: MEDICAL CENTER | Age: 35
End: 2020-12-01

## 2020-12-01 VITALS — DIASTOLIC BLOOD PRESSURE: 70 MMHG | SYSTOLIC BLOOD PRESSURE: 94 MMHG | WEIGHT: 262 LBS | BODY MASS INDEX: 42.29 KG/M2

## 2020-12-01 DIAGNOSIS — K21.9 GASTROESOPHAGEAL REFLUX DURING PREGNANCY IN SECOND TRIMESTER, ANTEPARTUM: ICD-10-CM

## 2020-12-01 DIAGNOSIS — D25.9 LEIOMYOMA OF UTERUS AFFECTING PREGNANCY IN SECOND TRIMESTER: ICD-10-CM

## 2020-12-01 DIAGNOSIS — O99.612 GASTROESOPHAGEAL REFLUX DURING PREGNANCY IN SECOND TRIMESTER, ANTEPARTUM: ICD-10-CM

## 2020-12-01 DIAGNOSIS — O34.12 LEIOMYOMA OF UTERUS AFFECTING PREGNANCY IN SECOND TRIMESTER: ICD-10-CM

## 2020-12-01 DIAGNOSIS — Z3A.25 25 WEEKS GESTATION OF PREGNANCY: Primary | ICD-10-CM

## 2020-12-01 DIAGNOSIS — Z34.92 SECOND TRIMESTER PREGNANCY: ICD-10-CM

## 2020-12-01 PROCEDURE — PNV: Performed by: OBSTETRICS & GYNECOLOGY

## 2020-12-01 RX ORDER — OMEPRAZOLE 20 MG/1
20 CAPSULE, DELAYED RELEASE ORAL DAILY
Qty: 90 CAPSULE | Refills: 2 | Status: SHIPPED | OUTPATIENT
Start: 2020-12-01

## 2020-12-01 RX ORDER — OMEPRAZOLE 20 MG/1
20 CAPSULE, DELAYED RELEASE ORAL DAILY
COMMUNITY
End: 2020-12-01 | Stop reason: SDUPTHER

## 2020-12-01 NOTE — PROGRESS NOTES
Assessment  37 y o  Marva Ram at 25w4d presenting for routine prenatal visit  Plan  Diagnoses and all orders for this visit:    25 weeks gestation of pregnancy  Second trimester pregnancy  -  labor precautions  - Normal movement discussed  - Level 2 reviewed  - Upcoming 28wk labs reviewed  - Return in 4wks for PN    Leiomyoma of uterus affecting pregnancy in second trimester  -     Recommended to have 28wk US; not yet scheduled  - Ambulatory Referral to Maternal Fetal Medicine; Future    Gastroesophageal reflux during pregnancy in second trimester, antepartum  -     omeprazole (PriLOSEC) 20 mg delayed release capsule; Take 1 capsule (20 mg total) by mouth daily        ____________________________________________________________        Subjective    Brain Mckenzie is a 37 y o  Marva Langist at 25w4d who presents for routine prenatal visit  She is reporting back pain  Intermittent and unchanged from prior; seeing PT for same  She denies contractions, loss of fluid, or vaginal bleeding  She feels regular fetal movements  Pregnancy Problems:  Patient Active Problem List   Diagnosis    GERD without esophagitis    Esophageal dysphagia    Uterine fibroid during pregnancy, antepartum    Back pain affecting pregnancy in second trimester    25 weeks gestation of pregnancy    Obesity affecting pregnancy in second trimester    Pelvic pain in pregnancy         Objective  BP 94/70   Wt 119 kg (262 lb)   LMP 2020 (Exact Date)   BMI 42 29 kg/m²     FHT: 136 BPM   Uterine Size: size equals dates     Physical Exam:  Physical Exam  Constitutional:       General: She is not in acute distress  Appearance: Normal appearance  She is well-developed  She is not ill-appearing, toxic-appearing or diaphoretic  HENT:      Head: Normocephalic and atraumatic  Eyes:      General: No scleral icterus  Right eye: No discharge  Left eye: No discharge        Conjunctiva/sclera: Conjunctivae normal  Pulmonary:      Effort: Pulmonary effort is normal  No accessory muscle usage or respiratory distress  Abdominal:      General: There is distension (gravid)  Tenderness: There is no abdominal tenderness  There is no guarding or rebound  Skin:     General: Skin is warm and dry  Coloration: Skin is not jaundiced  Findings: No bruising, erythema or rash  Neurological:      Mental Status: She is alert  Psychiatric:         Mood and Affect: Mood normal          Behavior: Behavior normal          Thought Content:  Thought content normal          Judgment: Judgment normal

## 2020-12-02 ENCOUNTER — APPOINTMENT (OUTPATIENT)
Dept: PHYSICAL THERAPY | Facility: CLINIC | Age: 35
End: 2020-12-02
Payer: COMMERCIAL

## 2020-12-09 ENCOUNTER — APPOINTMENT (OUTPATIENT)
Dept: PHYSICAL THERAPY | Facility: CLINIC | Age: 35
End: 2020-12-09
Payer: COMMERCIAL

## 2020-12-16 ENCOUNTER — OFFICE VISIT (OUTPATIENT)
Dept: PHYSICAL THERAPY | Facility: CLINIC | Age: 35
End: 2020-12-16
Payer: COMMERCIAL

## 2020-12-16 DIAGNOSIS — R10.2 PELVIC PAIN IN PREGNANCY: Primary | ICD-10-CM

## 2020-12-16 DIAGNOSIS — O26.899 PELVIC PAIN IN PREGNANCY: Primary | ICD-10-CM

## 2020-12-16 DIAGNOSIS — O99.891 BACK PAIN AFFECTING PREGNANCY IN SECOND TRIMESTER: ICD-10-CM

## 2020-12-16 DIAGNOSIS — M54.9 BACK PAIN AFFECTING PREGNANCY IN SECOND TRIMESTER: ICD-10-CM

## 2020-12-16 PROCEDURE — 97110 THERAPEUTIC EXERCISES: CPT | Performed by: PHYSICAL THERAPIST

## 2020-12-16 PROCEDURE — 97530 THERAPEUTIC ACTIVITIES: CPT | Performed by: PHYSICAL THERAPIST

## 2020-12-16 PROCEDURE — 97140 MANUAL THERAPY 1/> REGIONS: CPT | Performed by: PHYSICAL THERAPIST

## 2020-12-18 ENCOUNTER — ROUTINE PRENATAL (OUTPATIENT)
Dept: OBGYN CLINIC | Facility: MEDICAL CENTER | Age: 35
End: 2020-12-18
Payer: COMMERCIAL

## 2020-12-18 VITALS — WEIGHT: 263 LBS | DIASTOLIC BLOOD PRESSURE: 60 MMHG | SYSTOLIC BLOOD PRESSURE: 96 MMHG | BODY MASS INDEX: 42.45 KG/M2

## 2020-12-18 DIAGNOSIS — Z23 NEED FOR TDAP VACCINATION: ICD-10-CM

## 2020-12-18 DIAGNOSIS — Z3A.28 28 WEEKS GESTATION OF PREGNANCY: Primary | ICD-10-CM

## 2020-12-18 DIAGNOSIS — O99.212 OBESITY AFFECTING PREGNANCY IN SECOND TRIMESTER: ICD-10-CM

## 2020-12-18 PROCEDURE — PNV: Performed by: STUDENT IN AN ORGANIZED HEALTH CARE EDUCATION/TRAINING PROGRAM

## 2020-12-18 PROCEDURE — 90471 IMMUNIZATION ADMIN: CPT | Performed by: STUDENT IN AN ORGANIZED HEALTH CARE EDUCATION/TRAINING PROGRAM

## 2020-12-18 PROCEDURE — 90715 TDAP VACCINE 7 YRS/> IM: CPT | Performed by: STUDENT IN AN ORGANIZED HEALTH CARE EDUCATION/TRAINING PROGRAM

## 2020-12-18 PROCEDURE — 36415 COLL VENOUS BLD VENIPUNCTURE: CPT | Performed by: STUDENT IN AN ORGANIZED HEALTH CARE EDUCATION/TRAINING PROGRAM

## 2020-12-19 LAB
BASOPHILS # BLD AUTO: 31 CELLS/UL (ref 0–200)
BASOPHILS NFR BLD AUTO: 0.5 %
EOSINOPHIL # BLD AUTO: 43 CELLS/UL (ref 15–500)
EOSINOPHIL NFR BLD AUTO: 0.7 %
ERYTHROCYTE [DISTWIDTH] IN BLOOD BY AUTOMATED COUNT: 12.8 % (ref 11–15)
GLUCOSE 1H P 50 G GLC PO SERPL-MCNC: 104 MG/DL
HCT VFR BLD AUTO: 34.8 % (ref 35–45)
HGB BLD-MCNC: 11.5 G/DL (ref 11.7–15.5)
LYMPHOCYTES # BLD AUTO: 1686 CELLS/UL (ref 850–3900)
LYMPHOCYTES NFR BLD AUTO: 27.2 %
MCH RBC QN AUTO: 30.1 PG (ref 27–33)
MCHC RBC AUTO-ENTMCNC: 33 G/DL (ref 32–36)
MCV RBC AUTO: 91.1 FL (ref 80–100)
MONOCYTES # BLD AUTO: 273 CELLS/UL (ref 200–950)
MONOCYTES NFR BLD AUTO: 4.4 %
NEUTROPHILS # BLD AUTO: 4166 CELLS/UL (ref 1500–7800)
NEUTROPHILS NFR BLD AUTO: 67.2 %
PLATELET # BLD AUTO: 293 THOUSAND/UL (ref 140–400)
PMV BLD REES-ECKER: 10 FL (ref 7.5–12.5)
RBC # BLD AUTO: 3.82 MILLION/UL (ref 3.8–5.1)
WBC # BLD AUTO: 6.2 THOUSAND/UL (ref 3.8–10.8)

## 2020-12-21 ENCOUNTER — TELEPHONE (OUTPATIENT)
Dept: PERINATAL CARE | Facility: CLINIC | Age: 35
End: 2020-12-21

## 2020-12-22 ENCOUNTER — ULTRASOUND (OUTPATIENT)
Dept: PERINATAL CARE | Facility: CLINIC | Age: 35
End: 2020-12-22
Payer: COMMERCIAL

## 2020-12-22 VITALS
DIASTOLIC BLOOD PRESSURE: 74 MMHG | SYSTOLIC BLOOD PRESSURE: 110 MMHG | WEIGHT: 263.6 LBS | HEIGHT: 66 IN | HEART RATE: 73 BPM | BODY MASS INDEX: 42.36 KG/M2

## 2020-12-22 DIAGNOSIS — E66.01 MATERNAL MORBID OBESITY, ANTEPARTUM (HCC): ICD-10-CM

## 2020-12-22 DIAGNOSIS — D25.9 LEIOMYOMA OF UTERUS AFFECTING PREGNANCY IN SECOND TRIMESTER: ICD-10-CM

## 2020-12-22 DIAGNOSIS — IMO0002 EVALUATE ANATOMY NOT SEEN ON PRIOR SONOGRAM: ICD-10-CM

## 2020-12-22 DIAGNOSIS — O99.210 MATERNAL MORBID OBESITY, ANTEPARTUM (HCC): ICD-10-CM

## 2020-12-22 DIAGNOSIS — O34.12 LEIOMYOMA OF UTERUS AFFECTING PREGNANCY IN SECOND TRIMESTER: ICD-10-CM

## 2020-12-22 DIAGNOSIS — Z36.89 ENCOUNTER FOR ULTRASOUND TO CHECK FETAL GROWTH: Primary | ICD-10-CM

## 2020-12-22 PROCEDURE — 76816 OB US FOLLOW-UP PER FETUS: CPT | Performed by: OBSTETRICS & GYNECOLOGY

## 2020-12-22 PROCEDURE — 99212 OFFICE O/P EST SF 10 MIN: CPT | Performed by: OBSTETRICS & GYNECOLOGY

## 2020-12-23 ENCOUNTER — OFFICE VISIT (OUTPATIENT)
Dept: PHYSICAL THERAPY | Facility: CLINIC | Age: 35
End: 2020-12-23
Payer: COMMERCIAL

## 2020-12-23 DIAGNOSIS — M54.9 BACK PAIN AFFECTING PREGNANCY IN SECOND TRIMESTER: ICD-10-CM

## 2020-12-23 DIAGNOSIS — O26.899 PELVIC PAIN IN PREGNANCY: Primary | ICD-10-CM

## 2020-12-23 DIAGNOSIS — O99.891 BACK PAIN AFFECTING PREGNANCY IN SECOND TRIMESTER: ICD-10-CM

## 2020-12-23 DIAGNOSIS — R10.2 PELVIC PAIN IN PREGNANCY: Primary | ICD-10-CM

## 2020-12-23 PROCEDURE — 97110 THERAPEUTIC EXERCISES: CPT | Performed by: PHYSICAL THERAPIST

## 2020-12-23 PROCEDURE — 97530 THERAPEUTIC ACTIVITIES: CPT | Performed by: PHYSICAL THERAPIST

## 2020-12-23 PROCEDURE — 97140 MANUAL THERAPY 1/> REGIONS: CPT | Performed by: PHYSICAL THERAPIST

## 2020-12-29 ENCOUNTER — ROUTINE PRENATAL (OUTPATIENT)
Dept: OBGYN CLINIC | Facility: MEDICAL CENTER | Age: 35
End: 2020-12-29

## 2020-12-29 VITALS — DIASTOLIC BLOOD PRESSURE: 62 MMHG | WEIGHT: 265.7 LBS | SYSTOLIC BLOOD PRESSURE: 94 MMHG | BODY MASS INDEX: 42.89 KG/M2

## 2020-12-29 DIAGNOSIS — Z3A.29 29 WEEKS GESTATION OF PREGNANCY: Primary | ICD-10-CM

## 2020-12-29 PROCEDURE — PNV: Performed by: STUDENT IN AN ORGANIZED HEALTH CARE EDUCATION/TRAINING PROGRAM

## 2020-12-29 RX ORDER — FEXOFENADINE HCL 180 MG/1
180 TABLET ORAL DAILY
COMMUNITY

## 2020-12-30 PROBLEM — Z3A.29 29 WEEKS GESTATION OF PREGNANCY: Status: ACTIVE | Noted: 2020-10-29

## 2021-01-02 ENCOUNTER — TELEPHONE (OUTPATIENT)
Dept: LABOR AND DELIVERY | Facility: HOSPITAL | Age: 36
End: 2021-01-02

## 2021-01-02 ENCOUNTER — TELEPHONE (OUTPATIENT)
Dept: OTHER | Facility: OTHER | Age: 36
End: 2021-01-02

## 2021-01-03 NOTE — TELEPHONE ENCOUNTER
Pt Castro Franco : 1985 MSG: Pt is on her 3rd trimester and she had a fall today landed on her buttocks and has some discomfort  Pt and her partner & somewhat concerned   Call back # [phone]

## 2021-01-04 NOTE — TELEPHONE ENCOUNTER
Call returned to patient  Reports slip and fall from standing position onto buttocks  Grabbed chair to brace fall  Denies direct abdominal trauma  Denies current cramping/ctxns or bleeding  +FM noted  Blood type A+  Advised patient on concerns with trauma in regards to abruption  Trauma was minor by report and she feels well  Discussed observation for any cramping or spotting, which would warrant further evaluation  Patient feels comfortable observing at present and will call with concerns

## 2021-01-11 ENCOUNTER — ROUTINE PRENATAL (OUTPATIENT)
Dept: OBGYN CLINIC | Facility: MEDICAL CENTER | Age: 36
End: 2021-01-11

## 2021-01-11 VITALS — WEIGHT: 269 LBS | DIASTOLIC BLOOD PRESSURE: 68 MMHG | BODY MASS INDEX: 43.42 KG/M2 | SYSTOLIC BLOOD PRESSURE: 94 MMHG

## 2021-01-11 DIAGNOSIS — Z3A.31 31 WEEKS GESTATION OF PREGNANCY: Primary | ICD-10-CM

## 2021-01-11 DIAGNOSIS — Z34.93 THIRD TRIMESTER PREGNANCY: ICD-10-CM

## 2021-01-11 PROCEDURE — PNV: Performed by: OBSTETRICS & GYNECOLOGY

## 2021-01-11 NOTE — PROGRESS NOTES
Assessment  37 y o  Kristina Newell at 31w3d presenting for routine prenatal visit  Plan  Diagnoses and all orders for this visit:    31 weeks gestation of pregnancy  Third trimester pregnancy  -  labor precautions  - [address]  - Considering ; discussed visitor policy  - Return in 2wks for PN      ____________________________________________________________        Subjective    Ashley Pope is a 37 y o  Kristina Newell at 31w3d who presents for routine prenatal visit  She is reporting worsening pelvic pain  Tried PT and support bands, but her symptoms are generally with finding a comfortable position at night so these haven't helped  Discussed pillow placement strategies  Has some cramping but denies contractions, loss of fluid, or vaginal bleeding  She feels regular fetal movements  Considering using a   Advised on visitor policy and encouraged to bring in name of /agency if decides to use one  Pregnancy Problems:  Patient Active Problem List   Diagnosis    GERD without esophagitis    Esophageal dysphagia    Uterine fibroid during pregnancy, antepartum    Back pain affecting pregnancy in second trimester    31 weeks gestation of pregnancy    Obesity affecting pregnancy in second trimester    Pelvic pain in pregnancy         Objective  BP 94/68   Wt 122 kg (269 lb)   LMP 2020 (Exact Date)   BMI 43 42 kg/m²     FHT: 140 BPM   Uterine Size: size equals dates     Physical Exam:  Physical Exam  Constitutional:       General: She is not in acute distress  Appearance: Normal appearance  She is well-developed  She is not ill-appearing, toxic-appearing or diaphoretic  HENT:      Head: Normocephalic and atraumatic  Eyes:      General: No scleral icterus  Right eye: No discharge  Left eye: No discharge  Conjunctiva/sclera: Conjunctivae normal    Pulmonary:      Effort: Pulmonary effort is normal  No accessory muscle usage or respiratory distress     Abdominal: General: There is distension (gravid)  Tenderness: There is no abdominal tenderness  There is no guarding or rebound  Skin:     General: Skin is warm and dry  Coloration: Skin is not jaundiced  Findings: No bruising, erythema or rash  Neurological:      Mental Status: She is alert  Psychiatric:         Mood and Affect: Mood normal          Behavior: Behavior normal          Thought Content:  Thought content normal          Judgment: Judgment normal

## 2021-01-24 ENCOUNTER — TELEPHONE (OUTPATIENT)
Dept: OTHER | Facility: OTHER | Age: 36
End: 2021-01-24

## 2021-01-24 ENCOUNTER — DOCUMENTATION (OUTPATIENT)
Dept: LABOR AND DELIVERY | Facility: HOSPITAL | Age: 36
End: 2021-01-24

## 2021-01-24 NOTE — TELEPHONE ENCOUNTER
Called patient  Identified that she sees a different practice  She is having some cramping and belly tightening  No bleeding or leaking  Good movement  TT forwarded to Patrick Patrick on call for patient group

## 2021-01-24 NOTE — PROGRESS NOTES
Cramping and abdominal cramping at 33 weeks, 4-5x/hr  No bleeding or leakage of fluid  She has no tried Tylenol  She has been drinking fluids normal amount  Advised hydration, Tylenol   Advised pelvic rest, advised to call if contractions continue or more frequent advised to proceed to [facility] and to call back

## 2021-01-24 NOTE — TELEPHONE ENCOUNTER
[phone]/Elizabeth Guevara/ROMI 1985/ Patient is 33 weeks pregnant  Patient stated they are experiencing cramping and back pain  Patient stated her stomach feels hard

## 2021-01-25 ENCOUNTER — TELEPHONE (OUTPATIENT)
Dept: OBGYN CLINIC | Facility: MEDICAL CENTER | Age: 36
End: 2021-01-25

## 2021-01-25 NOTE — TELEPHONE ENCOUNTER
Patient states she is 33 wks pregnant , has an appt on Thursday but wants an earlier appt because she is having some cramping and feeling less movement this morning , no bleeding , no leaking fluid  Patient instructed to do fetal Kick now and if she does not get 10 movements in 2 hours she should give us call back  Discussed with Dr Eli Bates

## 2021-01-26 ENCOUNTER — TELEPHONE (OUTPATIENT)
Dept: OBGYN CLINIC | Facility: MEDICAL CENTER | Age: 36
End: 2021-01-26

## 2021-01-26 DIAGNOSIS — Z3A.31 31 WEEKS GESTATION OF PREGNANCY: Primary | ICD-10-CM

## 2021-01-26 DIAGNOSIS — O99.212 OBESITY AFFECTING PREGNANCY IN SECOND TRIMESTER: ICD-10-CM

## 2021-01-26 NOTE — TELEPHONE ENCOUNTER
C/O intermittent itching for several days  Denies new lotion detergent, etc   Denies pain and states that the itching is diffuse and all over body  Advised to try benadryl    Call back if symptoms worsen or itching localized to palms or soles of feet

## 2021-01-28 ENCOUNTER — HOSPITAL ENCOUNTER (OUTPATIENT)
Facility: HOSPITAL | Age: 36
Discharge: HOME | End: 2021-01-28
Attending: OBSTETRICS & GYNECOLOGY | Admitting: OBSTETRICS & GYNECOLOGY
Payer: COMMERCIAL

## 2021-01-28 ENCOUNTER — TELEPHONE (OUTPATIENT)
Dept: OBGYN CLINIC | Facility: MEDICAL CENTER | Age: 36
End: 2021-01-28

## 2021-01-28 VITALS
SYSTOLIC BLOOD PRESSURE: 112 MMHG | WEIGHT: 269 LBS | HEIGHT: 66 IN | BODY MASS INDEX: 43.23 KG/M2 | DIASTOLIC BLOOD PRESSURE: 70 MMHG | OXYGEN SATURATION: 93 % | HEART RATE: 102 BPM | TEMPERATURE: 98.8 F

## 2021-01-28 PROBLEM — Z3A.33 33 WEEKS GESTATION OF PREGNANCY: Status: ACTIVE | Noted: 2020-10-29

## 2021-01-28 LAB
ALBUMIN SERPL BCP-MCNC: 2.5 G/DL (ref 3.5–5)
ALP SERPL-CCNC: 77 U/L (ref 46–116)
ALT SERPL W P-5'-P-CCNC: 34 U/L (ref 12–78)
ANION GAP SERPL CALCULATED.3IONS-SCNC: 10 MMOL/L (ref 4–13)
AST SERPL W P-5'-P-CCNC: 16 U/L (ref 5–45)
BACTERIA UR QL AUTO: ABNORMAL /HPF
BILIRUB SERPL-MCNC: 0.18 MG/DL (ref 0.2–1)
BILIRUB UR QL STRIP: NEGATIVE
BUN SERPL-MCNC: 9 MG/DL (ref 5–25)
CALCIUM ALBUM COR SERPL-MCNC: 10 MG/DL (ref 8.3–10.1)
CALCIUM SERPL-MCNC: 8.8 MG/DL (ref 8.3–10.1)
CHLORIDE SERPL-SCNC: 102 MMOL/L (ref 100–108)
CLARITY UR: CLEAR
CO2 SERPL-SCNC: 23 MMOL/L (ref 21–32)
COLOR UR: YELLOW
CREAT SERPL-MCNC: 0.52 MG/DL (ref 0.6–1.3)
GFR SERPL CREATININE-BSD FRML MDRD: 126 ML/MIN/1.73SQ M
GLUCOSE SERPL-MCNC: 85 MG/DL (ref 65–140)
GLUCOSE UR STRIP-MCNC: NEGATIVE MG/DL
HGB UR QL STRIP.AUTO: NEGATIVE
KETONES UR STRIP-MCNC: NEGATIVE MG/DL
LEUKOCYTE ESTERASE UR QL STRIP: ABNORMAL
NITRITE UR QL STRIP: NEGATIVE
NON-SQ EPI CELLS URNS QL MICRO: ABNORMAL /HPF
PH UR STRIP.AUTO: 6 [PH]
POTASSIUM SERPL-SCNC: 3.8 MMOL/L (ref 3.5–5.3)
PROT SERPL-MCNC: 7 G/DL (ref 6.4–8.2)
PROT UR STRIP-MCNC: NEGATIVE MG/DL
RBC #/AREA URNS AUTO: ABNORMAL /HPF
SODIUM SERPL-SCNC: 135 MMOL/L (ref 136–145)
SP GR UR STRIP.AUTO: 1.02 (ref 1–1.03)
UROBILINOGEN UR QL STRIP.AUTO: 0.2 E.U./DL
WBC #/AREA URNS AUTO: ABNORMAL /HPF

## 2021-01-28 PROCEDURE — 80053 COMPREHEN METABOLIC PANEL: CPT | Performed by: OBSTETRICS & GYNECOLOGY

## 2021-01-28 PROCEDURE — 99203 OFFICE O/P NEW LOW 30 MIN: CPT

## 2021-01-28 PROCEDURE — NC001 PR NO CHARGE: Performed by: OBSTETRICS & GYNECOLOGY

## 2021-01-28 PROCEDURE — 81001 URINALYSIS AUTO W/SCOPE: CPT | Performed by: OBSTETRICS & GYNECOLOGY

## 2021-01-28 PROCEDURE — 82240 BILE ACIDS CHOLYLGLYCINE: CPT | Performed by: OBSTETRICS & GYNECOLOGY

## 2021-01-28 NOTE — PROGRESS NOTES
L&D Triage Note - OB/GYN  Jevon Santoro 37 y o  female MRN: [id number]  Unit/Bed#: L&D 327-01 Encounter: [id number]      Patient is seen by [facility]  ASSESSMENT  Jevon Santoro is a 37 y o   at 33w6d w/ pelvic pain  Not in  labor  PLAN  #1  Pelvic pain/cramping:   · CL 3cm, SVE 0/0/-4  · No contractions on toco  · F/U UA to r/o UTI and/or nephrolithiasis  · Discussed round ligament pain and different interventions  Has tried PT in the past and says it has not helped  #2  Itching:   · Worse at night, no specifically on palms/soles  · Bile acids and CMP ordered    #3  Discharge instructions  · Patient instructed to call if experiencing worsening contractions, vaginal bleeding, loss of fluid or decreased fetal movement  · Will follow up with OBGYN on 21  D/w Dr Ledy Alonzo     ______________    SUBJECTIVE    AVE: Estimated Date of Delivery: 3/12/21    HPI:  37 y o  Filippo Powell presents with complaint of pelvic cramping  Patient reports having lower pelvic cramping since the 2nd trimester  Pain is worse on her left today and also reports some left sided flank/back pain  She took 1000mg of tylenol without any relief  Denies regular contractions  Denies abnormal discharge  Patient also reported experiencing nighttime itchiness for the past week  It is all over her body, worse on her back and leg  Denies itching of palms or soles  Contractions: no  Leakage of fluid: no  Vaginal Bleeding: no  Fetal movement: present    Her current obstetrical history is significant for BMI 44      ROS:  Constitutional: Negative  Respiratory: Negative  Cardiovascular: Negative    Gastrointestinal: Negative      OBJECTIVE:  /70   Pulse 102   Temp 98 8 °F (37 1 °C) (Oral)   Ht 5' 6" (1 676 m)   Wt 122 kg (269 lb)   LMP 2020 (Exact Date)   SpO2 93%   BMI 43 42 kg/m²   Body mass index is 43 42 kg/m²  Physical Exam  Constitutional:       General: She is not in acute distress  Appearance: She is obese  She is not ill-appearing  Cardiovascular:      Rate and Rhythm: Normal rate  Pulmonary:      Effort: Pulmonary effort is normal  No respiratory distress  Skin:     General: Skin is warm and dry  Neurological:      Mental Status: She is alert and oriented to person, place, and time     Psychiatric:         Mood and Affect: Mood normal          Behavior: Behavior normal        Speculum exam: white vaginal discharge noted; no bleeding or fluid seen    KOH/Wet [place]:     Infection:   - No clue cells    - No hyphae   - No trichomonads present    Membrane status   - No ferning   - Negative nitrazine   - No pooling     SVE: 0/0/-4    FHT:  Baseline Rate: 130 bpm  Variability: Moderate 6-25 bpm  Accelerations: 15 x 15 or greater  Decelerations: None    TOCO:   Contraction Frequency (minutes): irregular  Contraction Duration (seconds): 60-70  Contraction Quality: Mild    IMAGING:       TVUS   Cervical length         - 3 00cm         - 2 95cm   Presentation: vertex    Labs:   Recent Results (from the past 24 hour(s))   UA w Reflex to Microscopic w Reflex to Culture    Collection Time: 01/28/21  4:35 PM    Specimen: Urine, Clean Catch   Result Value Ref Range    Color, UA Yellow     Clarity, UA Clear     Specific Gravity, UA 1 020 1 003 - 1 030    pH, UA 6 0 4 5, 5 0, 5 5, 6 0, 6 5, 7 0, 7 5, 8 0    Leukocytes, UA Trace (A) Negative    Nitrite, UA Negative Negative    Protein, UA Negative Negative mg/dl    Glucose, UA Negative Negative mg/dl    Ketones, UA Negative Negative mg/dl    Urobilinogen, UA 0 2 0 2, 1 0 E U /dl E U /dl    Bilirubin, UA Negative Negative    Blood, UA Negative Negative   Comprehensive metabolic panel    Collection Time: 01/28/21  4:36 PM   Result Value Ref Range    Sodium 135 (L) 136 - 145 mmol/L    Potassium 3 8 3 5 - 5 3 mmol/L    Chloride 102 100 - 108 mmol/L    CO2 23 21 - 32 mmol/L    ANION GAP 10 4 - 13 mmol/L    BUN 9 5 - 25 mg/dL    Creatinine 0 52 (L) 0 60 - 1 30 mg/dL    Glucose 85 65 - 140 mg/dL    Calcium 8 8 8 3 - 10 1 mg/dL    Corrected Calcium 10 0 8 3 - 10 1 mg/dL    AST 16 5 - 45 U/L    ALT 34 12 - 78 U/L    Alkaline Phosphatase 77 46 - 116 U/L    Total Protein 7 0 6 4 - 8 2 g/dL    Albumin 2 5 (L) 3 5 - 5 0 g/dL    Total Bilirubin 0 18 (L) 0 20 - 1 00 mg/dL    eGFR 126 ml/min/1 73sq m         Eduardo Toribio MD  OB/GYN PGY-3  1/28/2021  5:02 PM

## 2021-01-28 NOTE — TELEPHONE ENCOUNTER
Pt is 33 wks and 6 days  Called stating she is experiencing a lot of cramping since this morning and terrible back pain  She took tylenol and it is not going away  Denied any bleeding and states the baby is moving normal  She is asking for a  Call back

## 2021-01-28 NOTE — DISCHARGE INSTRUCTIONS
Round Ligament Pain   AMBULATORY CARE:   Round ligament pain  is caused when ligaments are stretched as your uterus (womb) gets bigger during pregnancy  Round ligaments are found on each side of your uterus  They are bands of tissue that hold the uterus in place  Round ligament pain happens most often during the second trimester  It is a normal part of pregnancy and should stop by the third trimester  The pain is not serious and will not hurt your baby  Common signs and symptoms of round ligament pain:   · Pain on one or both sides of your lower abdomen or groin that may move up to your hip    · Spasms in the muscles in your abdomen    · Pain that lasts a few seconds    · Pain that happens when you exercise, sneeze, change positions, or stand quickly    Contact your obstetrician if:   · You have pain that is spreading to other parts of your body  · You have new or worsening pain  · You have pain that lasts longer than a few minutes at a time  · You have questions or concerns about your condition or care  Manage your pain:  Round ligament pain does not need to be treated  The following may help make you more comfortable:  · Rest as often as you can  Rest can help relieve round ligament pain  You might want to lie on the side that has pain  Place a pillow under your abdomen  Keep another pillow between your knees  · Move slowly  Sudden movement can stretch the ligaments and cause pain  Stand, sit, and change positions slowly  Try to tighten the muscles in your hips before you sneeze or laugh  You can also sit down and bring your knees up toward your abdomen  This can help relieve tension on the ligaments  · Exercise as directed  Gentle exercise can keep the ligaments loose and strengthen core (abdominal) muscles  An example is swimming, or a yoga program designed for pregnancy  Ask your healthcare provider which exercises are safe for you and how often to exercise   For most healthy women, a good goal is to try to get at least 30 minutes of exercise every day  If activity causes pain, try not to walk too long or too far at one time  Break your exercise up into short amounts  · Apply a warm compress to the area  Warmth can relieve pain and muscle spasms  Ask your healthcare provider if you can take a warm bath or use a heating pad  Keep all heat settings low  High heat can be dangerous for your baby  Do not sit in a hot tub or use hot water in your bath  You may also be able to massage the area gently while you are applying heat  Massage can help relieve pain  · Ask about pain medicines  Ask your healthcare provider before you take any medicine during pregnancy, including over-the-counter pain medicines  Your healthcare provider may recommend acetaminophen to relieve the pain  Ask how much to take and how often to take it  Follow directions  Acetaminophen can cause liver damage  Too much medicine can be harmful to your baby  Follow up with your obstetrician as directed:  Write down your questions so you remember to ask them during your visits  © Copyright [address] Information is for End User's use only and may not be sold, redistributed or otherwise used for commercial purposes  All illustrations and images included in CareNotes® are the copyrighted property of A D A M , Inc  or [facility] [facility]  The above information is an  only  It is not intended as medical advice for individual conditions or treatments  Talk to your doctor, nurse or pharmacist before following any medical regimen to see if it is safe and effective for you  Pregnancy at 31 to 34 [address]:   You may continue to have symptoms such as shortness of breath, heartburn, contractions, or swelling of your ankles and feet  You may be gaining about 1 pound a week now     DISCHARGE INSTRUCTIONS:   Seek care immediately if:   · You develop a severe headache that does not go away     · You have new or increased vision changes, such as blurred or spotted vision  · You have new or increased swelling in your face or hands  · You have vaginal spotting or bleeding  · Your water broke or you feel warm water gushing or trickling from your vagina  Contact your healthcare provider if:   · You have more than 5 contractions in 1 hour  · You notice any changes in your baby's movements  · You have abdominal cramps, pressure, or tightening  · You have a change in vaginal discharge  · You have chills or a fever  · You have vaginal itching, burning, or pain  · You have yellow, green, white, or foul-smelling vaginal discharge  · You have pain or burning when you urinate, less urine than usual, or pink or bloody urine  · You have questions or concerns about your condition or care  How to care for yourself at this stage of your pregnancy:   · Eat a variety of healthy foods  Healthy foods include fruits, vegetables, whole-grain breads, low-fat dairy foods, beans, lean meats, and fish  Drink liquids as directed  Ask how much liquid to drink each day and which liquids are best for you  Limit caffeine to less than 200 milligrams each day  Limit your intake of fish to 2 servings each week  Choose fish low in mercury such as canned light tuna, shrimp, salmon, cod, or tilapia  Do not  eat fish high in mercury such as swordfish, tilefish, sharon mackerel, and shark  · Manage heartburn  by eating 4 or 5 small meals each day instead of large meals  Avoid spicy food  · Manage swelling  by lying down and putting your feet up  · Take prenatal vitamins as directed  Your need for certain vitamins and minerals, such as folic acid, increases during pregnancy  Prenatal vitamins provide some of the extra vitamins and minerals you need  Prenatal vitamins may also help to decrease the risk of certain birth defects      · Talk to your healthcare provider about exercise  Moderate exercise can help you stay fit  Your healthcare provider will help you plan an exercise program that is safe for you during pregnancy  · Do not smoke  Smoking increases your risk of a miscarriage and other health problems during your pregnancy  Smoking can cause your baby to be born too early or weigh less at birth  Ask your healthcare provider for information if you need help quitting  · Do not drink alcohol  Alcohol passes from your body to your baby through the placenta  It can affect your baby's brain development and cause fetal alcohol syndrome (FAS)  FAS is a group of conditions that causes mental, behavior, and growth problems  · Talk to your healthcare provider before you take any medicines  Many medicines may harm your baby if you take them when you are pregnant  Do not take any medicines, vitamins, herbs, or supplements without first talking to your healthcare provider  Never use illegal or street drugs (such as marijuana or cocaine) while you are pregnant  Safety tips during pregnancy:   · Avoid hot tubs and saunas  Do not use a hot tub or sauna while you are pregnant, especially during your first trimester  Hot tubs and saunas may raise your baby's temperature and increase the risk of birth defects  · Avoid toxoplasmosis  This is an infection caused by eating raw meat or being around infected cat feces  It can cause birth defects, miscarriages, and other problems  Wash your hands after you touch raw meat  Make sure any meat is well-cooked before you eat it  Avoid raw eggs and unpasteurized milk  Use gloves or ask someone else to clean your cat's litter box while you are pregnant  Changes that are happening with your baby:  By 34 weeks, your baby may weigh more than 5 pounds  Your baby will be about 12 ½ inches long from the top of the head to the rump (baby's bottom)  Your baby is gaining about ½ pound a week  Your baby's eyes open and close now   Your baby's kicks and movements are more forceful at this time  What you need to know about prenatal care: Your healthcare provider will check your blood pressure and weight  You may also need the following:  · A urine test  may also be done to check for sugar and protein  These can be signs of gestational diabetes or infection  Protein in your urine may also be a sign of preeclampsia  Preeclampsia is a condition that can develop during week 20 or later of your pregnancy  It causes high blood pressure, and it can cause problems with your kidneys and other organs  · A Tdap vaccine  may be recommended by your healthcare provider  · Fundal height  is a measurement of your uterus to check your baby's growth  This number is usually the same as the number of weeks that you have been pregnant  Your healthcare provider may also check your baby's position  · Your baby's heart rate  will be checked  © Copyright [address] Information is for End User's use only and may not be sold, redistributed or otherwise used for commercial purposes  All illustrations and images included in CareNotes® are the copyrighted property of [employer] , Inc  or [facility]   The above information is an  only  It is not intended as medical advice for individual conditions or treatments  Talk to your doctor, nurse or pharmacist before following any medical regimen to see if it is safe and effective for you

## 2021-01-28 NOTE — TELEPHONE ENCOUNTER
Patient called approx 20-30 minutes earlier and left a message  She noted she was having abdominal cramping and severe backpain  The pt states it has gotten worse and would like to know if she is able to speak with someone   She has no other symptoms, no bleeding, no discharge, and the baby is still moving normal

## 2021-02-01 LAB — BILE AC SERPL-SCNC: 1.7 UMOL/L (ref 0–10)

## 2021-02-03 ENCOUNTER — TELEPHONE (OUTPATIENT)
Dept: PERINATAL CARE | Facility: CLINIC | Age: 36
End: 2021-02-03

## 2021-02-03 NOTE — TELEPHONE ENCOUNTER
Spoke with patient and confirmed appointment with [facility]  1 support person ( must be over age of 15) may accompany patient  Will you and your support person be able to wear a mask ,without a valve , during entire appointment? YES   To minimize your exposure in our waiting area,check in and rooming questions will be done via phone  When you arrive in the parking lot please call the following inside line # prior to entering office:    Darius Cortez: [phone]    Have you or your support person traveled outside the state in the last 2 weeks? NO  If yes, what state did you travel to? Do you or your support person have:  Fever or flu- like symptoms? NO  Symptoms of upper respiratory infection like runny nose, sore throat or cough? NO  Do you have new headache that you have not had in the past?NO  Have you experienced any new shortness of breath recently? NO  Do you have any new loss of taste or smell? NO  Do you have any new diarrhea, nausea or vomiting? NO  Have you recently been in contact with anyone who has been sick or diagnosed with COVID-19 infection? NO  Have you been recommended to quarantine because of an exposure to a confirmed positive COVID19 person? NO  Have you recently been tested for COVID19? NO    Patient verbalized understanding of all instructions   -------------------------------------------------------------

## 2021-02-04 ENCOUNTER — ULTRASOUND (OUTPATIENT)
Dept: PERINATAL CARE | Facility: OTHER | Age: 36
End: 2021-02-04
Payer: COMMERCIAL

## 2021-02-04 VITALS
SYSTOLIC BLOOD PRESSURE: 93 MMHG | DIASTOLIC BLOOD PRESSURE: 64 MMHG | HEIGHT: 66 IN | BODY MASS INDEX: 43.87 KG/M2 | WEIGHT: 273 LBS | HEART RATE: 90 BPM

## 2021-02-04 DIAGNOSIS — O99.212 OBESITY AFFECTING PREGNANCY IN SECOND TRIMESTER: Primary | ICD-10-CM

## 2021-02-04 DIAGNOSIS — Z3A.34 34 WEEKS GESTATION OF PREGNANCY: ICD-10-CM

## 2021-02-04 PROCEDURE — 76816 OB US FOLLOW-UP PER FETUS: CPT | Performed by: OBSTETRICS & GYNECOLOGY

## 2021-02-04 NOTE — PROGRESS NOTES
Jevon Santoro has no complaints today  She reports regular fetal movements and does not report any problems related to hypertension, gestational diabetes,  labor, or vaginal bleeding  Her prenatal course has apparently been unremarkable in the interim since her most recent visit here  Problem list:  1  Increased BMI  2  Renal pyelectasis of 3-4 millimeters at 23 weeks  3  Possible posterior fundal fibroid that measured 3 24 x 2 7 centimeters at 13 weeks    Ultrasound findings: The ultrasound today shows normal interval fetal growth and fluid  The fetal kidneys today appear normal   We could not see a posterior fibroid today  Pregnancy ultrasound has limitations and is unable to detect all forms of fetal congenital abnormalities  The inaccuracy in the EFW can be off by 1 lb either way in the third trimester  Recommend starting weekly nonstress tests /fluid scan secondary to her BMI of 40 which increases her risk for stillbirth at term  Recommend also daily fetal kick counts      Ramone Mendez MD

## 2021-02-04 NOTE — LETTER
2021     Elaine Lin MD  [address]    Patient: Sissy Del Rio   YOB: 1985   Date of Visit: 2021       Dear Dr Laurent Gonzales:    Thank you for referring Sissy Del Rio to me for evaluation  Below are my notes for this consultation  If you have questions, please do not hesitate to call me  I look forward to following your patient along with you  Sincerely,        Norma Hurley MD        CC: No Recipients  Norma Hurley MD  2021  4:21 PM  Incomplete  Sissy Del Rio has no complaints today  She reports regular fetal movements and does not report any problems related to hypertension, gestational diabetes,  labor, or vaginal bleeding  Her prenatal course has apparently been unremarkable in the interim since her most recent visit here  Problem list:  1  Increased BMI  2  Renal pyelectasis of 3-4 millimeters at 23 weeks  3  Possible posterior fundal fibroid that measured 3 24 x 2 7 centimeters at 13 weeks    Ultrasound findings: The ultrasound today shows normal interval fetal growth and fluid  The fetal kidneys today appear normal   We could not see a posterior fibroid today  Pregnancy ultrasound has limitations and is unable to detect all forms of fetal congenital abnormalities  The inaccuracy in the EFW can be off by 1 lb either way in the third trimester  Recommend starting weekly nonstress tests /fluid scan secondary to her BMI of 40 which increases her risk for stillbirth at term  Recommend also daily fetal kick counts      Norma Hurley MD

## 2021-02-05 ENCOUNTER — TELEPHONE (OUTPATIENT)
Dept: PERINATAL CARE | Facility: CLINIC | Age: 36
End: 2021-02-05

## 2021-02-07 NOTE — PROGRESS NOTES
Tiffany Parks is a 37y o  year old  at 35w2d for routine prenatal visit    + FM, no vaginal bleeding, contractions, or LOF  Complaints: Yes pelvic pressure since 2nd trimester   Most recent ultrasound and labs reviewed  21  MEASUREMENTS (* Included In Average GA)      AC              32 5 cm        36 weeks 4 days* (85%)   BPD              8 9 cm        36 weeks 1 day * (74%)   HC              32 7 cm        36 weeks 4 days* (69%)   Femur            6 8 cm        34 weeks 4 days* (50%)      Cerebellum       4 9 cm        36 weeks 1 day      HC/AC           1 01   FL/AC           0 21   FL/BPD          0 76   EFW (Ac/Fl/Hc)  2831 grams - 6 lbs 4 oz  (68%)    Renal pyelectasis of 3-4 millimeters at 23 weeks  Possible posterior fundal fibroid that measured 3 24 x 2 7 centimeters at 13 weeks    Needs NST - weekly at 36 weeks for BMI > 40       Pt is concerned about covid   Advise given to limit any out side contact and to wear mask  No eating out no shopping if not neeed    Pt worried about her weight and the complication   Will start NST

## 2021-02-09 ENCOUNTER — ROUTINE PRENATAL (OUTPATIENT)
Dept: OBGYN CLINIC | Facility: MEDICAL CENTER | Age: 36
End: 2021-02-09

## 2021-02-09 VITALS
SYSTOLIC BLOOD PRESSURE: 100 MMHG | DIASTOLIC BLOOD PRESSURE: 70 MMHG | HEIGHT: 66 IN | BODY MASS INDEX: 43.87 KG/M2 | WEIGHT: 273 LBS

## 2021-02-09 DIAGNOSIS — Z34.03 ENCOUNTER FOR SUPERVISION OF NORMAL FIRST PREGNANCY IN THIRD TRIMESTER: Primary | ICD-10-CM

## 2021-02-09 PROCEDURE — PNV: Performed by: OBSTETRICS & GYNECOLOGY

## 2021-02-17 ENCOUNTER — ROUTINE PRENATAL (OUTPATIENT)
Dept: OBGYN CLINIC | Facility: CLINIC | Age: 36
End: 2021-02-17
Payer: COMMERCIAL

## 2021-02-17 VITALS — SYSTOLIC BLOOD PRESSURE: 115 MMHG | DIASTOLIC BLOOD PRESSURE: 75 MMHG | WEIGHT: 274.3 LBS | BODY MASS INDEX: 44.27 KG/M2

## 2021-02-17 DIAGNOSIS — O99.212 OBESITY AFFECTING PREGNANCY IN SECOND TRIMESTER: ICD-10-CM

## 2021-02-17 DIAGNOSIS — Z3A.36 36 WEEKS GESTATION OF PREGNANCY: Primary | ICD-10-CM

## 2021-02-17 PROCEDURE — PNV: Performed by: OBSTETRICS & GYNECOLOGY

## 2021-02-17 PROCEDURE — 87150 DNA/RNA AMPLIFIED PROBE: CPT | Performed by: OBSTETRICS & GYNECOLOGY

## 2021-02-17 PROCEDURE — 59025 FETAL NON-STRESS TEST: CPT | Performed by: OBSTETRICS & GYNECOLOGY

## 2021-02-17 NOTE — ASSESSMENT & PLAN NOTE
17 4 kg (38 lb 6 5 oz)    Will start NST weekly at visits for elevated BMI     Last scan for growth 2/4  MEASUREMENTS (* Included In Average GA)      AC              32 5 cm        36 weeks 4 days* (85%)   BPD              8 9 cm        36 weeks 1 day * (74%)   HC              32 7 cm        36 weeks 4 days* (69%)   Femur            6 8 cm        34 weeks 4 days* (50%)      Cerebellum       4 9 cm        36 weeks 1 day      HC/AC           1 01   FL/AC           0 21   FL/BPD          0 76   EFW (Ac/Fl/Hc)  2831 grams - 6 lbs 4 oz (68%)

## 2021-02-17 NOTE — PROGRESS NOTES
Wyatt Zamora is a 37y o  year old  at 44w9d for routine prenatal visit  GBS done Yes  PCN allergy No  Labor precautions given  [address] reviewed       Problem List Items Addressed This Visit        Other    36 weeks gestation of pregnancy     GBS collected today          Relevant Orders    Strep B DNA probe, amplification    Obesity affecting pregnancy in second trimester - Primary     17 4 kg (38 lb 6 5 oz)    Will start NST weekly at visits for elevated BMI     Last scan for growth 2/4  MEASUREMENTS (* Included In Average GA)      AC              32 5 cm        36 weeks 4 days* (85%)   BPD              8 9 cm        36 weeks 1 day * (74%)   HC              32 7 cm        36 weeks 4 days* (69%)   Femur            6 8 cm        34 weeks 4 days* (50%)      Cerebellum       4 9 cm        36 weeks 1 day      HC/AC           1 01   FL/AC           0 21   FL/BPD          0 76   EFW (Ac/Fl/Hc)  2831 grams - 6 lbs 4 oz (68%)                    NST today  - reactive no deceleration no contractions

## 2021-02-19 LAB — GP B STREP DNA SPEC QL NAA+PROBE: NEGATIVE

## 2021-02-25 ENCOUNTER — ROUTINE PRENATAL (OUTPATIENT)
Dept: OBGYN CLINIC | Facility: MEDICAL CENTER | Age: 36
End: 2021-02-25
Payer: COMMERCIAL

## 2021-02-25 VITALS — WEIGHT: 274 LBS | SYSTOLIC BLOOD PRESSURE: 110 MMHG | BODY MASS INDEX: 44.22 KG/M2 | DIASTOLIC BLOOD PRESSURE: 72 MMHG

## 2021-02-25 DIAGNOSIS — O99.212 OBESITY AFFECTING PREGNANCY IN SECOND TRIMESTER: Primary | ICD-10-CM

## 2021-02-25 DIAGNOSIS — Z3A.37 37 WEEKS GESTATION OF PREGNANCY: ICD-10-CM

## 2021-02-25 PROCEDURE — PNV: Performed by: OBSTETRICS & GYNECOLOGY

## 2021-02-25 PROCEDURE — 59025 FETAL NON-STRESS TEST: CPT | Performed by: OBSTETRICS & GYNECOLOGY

## 2021-02-26 ENCOUNTER — TELEPHONE (OUTPATIENT)
Dept: OBGYN CLINIC | Facility: MEDICAL CENTER | Age: 36
End: 2021-02-26

## 2021-02-26 ENCOUNTER — HOSPITAL ENCOUNTER (OUTPATIENT)
Facility: HOSPITAL | Age: 36
Discharge: HOME | End: 2021-02-26
Attending: OBSTETRICS & GYNECOLOGY | Admitting: OBSTETRICS & GYNECOLOGY
Payer: COMMERCIAL

## 2021-02-26 PROCEDURE — 99212 OFFICE O/P EST SF 10 MIN: CPT

## 2021-02-26 PROCEDURE — 76815 OB US LIMITED FETUS(S): CPT | Performed by: OBSTETRICS & GYNECOLOGY

## 2021-02-26 PROCEDURE — NC001 PR NO CHARGE: Performed by: OBSTETRICS & GYNECOLOGY

## 2021-02-26 NOTE — TELEPHONE ENCOUNTER
Pt called in 38 weeks pregnant, stating that she is having some leaking discharge  Please call and review

## 2021-02-26 NOTE — PROGRESS NOTES
Routine Prenatal Visit  [facility] [address] [place] [address]    Assessment/Plan:  Evangelina Carmona is a 37y o  year old  at 41w10d who presents for routine prenatal visit  1  Obesity affecting pregnancy in second trimester  Assessment & Plan:  NST reactive no decelerations good fetal movement  Cont for weekly NST       2  37 weeks gestation of pregnancy  Assessment & Plan:  No complaints  GBS negative     Went over induction of labor and she is strongly considering it   Will get examined next week and then pick induction date  Went over the process a bit but depends on exam             Subjective:     CC: Prenatal care    Governor Watkins is a 37 y o  Mariea Naubinway female who presents for routine prenatal care at 37w6d  Pregnancy ROS: no leakage of fluid, pelvic pain, or vaginal bleeding  positive and normal  fetal movement  The following portions of the patient's history were reviewed and updated as appropriate: allergies, current medications, past family history, past medical history, obstetric history, gynecologic history, past social history, past surgical history and problem list       Objective:  /72   Wt 124 kg (274 lb)   LMP 2020 (Exact Date)   BMI 44 22 kg/m²   Pregravid Weight/BMI: 107 kg (235 lb 14 3 oz) (BMI 38 09)  Current Weight: 124 kg (274 lb)   Total Weight Gain: 17 3 kg (38 lb 1 7 oz)   Pre- Vitals      Most Recent Value   Prenatal Assessment   Fetal Heart Rate  134   Movement  Present   Prenatal Vitals   Blood Pressure  110/72   Weight - Scale  124 kg (274 lb)   Urine Albumin/Glucose   Dilation/Effacement/Station   Vaginal Drainage   Edema   LLE Edema  Trace   RLE Edema  Trace           General: Well appearing, no distress  Respiratory: Unlabored breathing  Cardiovascular: Regular rate  Abdomen: Soft, gravid, nontender  Fundal Height: Appropriate for gestational age  Extremities: Warm and well perfused  Non tender      NST reactive no decleration + movement and no contractions

## 2021-02-26 NOTE — PROCEDURES
Adore Hebert, a  at 38w0d with an AVE of 3/12/2021, by Last Menstrual Period, was seen at [address] [facility] for the following procedure(s): $Procedure Type: SANDRA]    Nonstress Test  Variability: Moderate  Decelerations: None  Accelerations: Yes  Acoustic Stimulator: No  Baseline: 120 BPM  Uterine Irritability: Yes  Contractions: Irregular    4 Quadrant SANDRA  SANDRA Q1 (cm): 5 cm  SANDRA Q2 (cm): 2 3 cm  SANDRA Q3 (cm): 2 3 cm  SANDRA Q4 (cm): 2 7 cm  SANDRA TOTAL (cm): 12 3 cm              Interpretation  Nonstress Test Interpretation: Reactive  Overall Impression: Luna Barthel, MD  PGY-1 OB/GYN   2021 1:35 PM

## 2021-02-26 NOTE — ASSESSMENT & PLAN NOTE
No complaints  GBS negative     Went over induction of labor and she is strongly considering it   Will get examined next week and then pick induction date       Went over the process a bit but depends on exam

## 2021-02-26 NOTE — PROGRESS NOTES
Triage Note - OB  Steven Hernandez 37 y o  female MRN: [id number]  Unit/Bed#: L&D 329-01 Encounter: [id number]    Chief Complaint: leaking fluid    SUBJECTIVE    HPI: 37 y o  [place] at 38w0d with c/o small leaking of clear fluid at some point this morning then again 2 hours later  She denies any contractions at this time or any vaginal bleeding  She reports good fetal movement  OBJECTIVE  GBS: negative  Blood type: A positive    Estimated Date of Delivery: 3/12/21    Vitals: VSS  There were no vitals filed for this visit  There is no height or weight on file to calculate BMI  FHR: 120s, reactive  [place]: irregular, mostly irritability     Physical Exam  Constitutional:       Appearance: She is obese  HENT:      Head: Normocephalic and atraumatic  Pulmonary:      Effort: Pulmonary effort is normal    Abdominal:      Comments: Gravid   Musculoskeletal: Normal range of motion  Skin:     General: Skin is warm and dry  Neurological:      Mental Status: She is alert and oriented to person, place, and time  Psychiatric:         Mood and Affect: Mood normal          Behavior: Behavior normal          Thought Content: Thought content normal          Judgment: Judgment normal              Labs:   No visits with results within 1 Day(s) from this visit  Latest known visit with results is:   Routine Prenatal on 2021   Component Date Value    Strep Grp B PCR 2021 Negative            A/P  37 y o  female  at 38w0d with c/o leaking vaginal fluid, ruled out for rupture  Leaking fluid   - Negative pooling, nitrazine or ferning   - SANDRA 12 3 cm   - NST reactive (decel appears on tracing at one point, though it is the result of patient movement during speculum exam and is the patient's hear rate)   - Pt to follow up at the office next week    Discussed with Dr Joel Notice: d/c home with labor precautions  Discharge instructions given to patient and labor precautions reviewed      Kenrick Britt MD  OB-GYN, PGY-1  2/26/2021 1:10 PM

## 2021-02-26 NOTE — DISCHARGE INSTRUCTIONS
Pregnancy at 28 to [address]:   You are considered full term at the beginning of 37 weeks  Your breathing may be easier if your baby has moved down into a head-down position  You may need to urinate more often because the baby may be pressing on your bladder  You may also feel more discomfort and get tired easily  DISCHARGE INSTRUCTIONS:   Seek care immediately if:   · You develop a severe headache that does not go away  · You have new or increased vision changes, such as blurred or spotted vision  · You have new or increased swelling in your face or hands  · You have vaginal spotting or bleeding  · Your water broke or you feel warm water gushing or trickling from your vagina  Contact your healthcare provider if:   · You have more than 5 contractions in 1 hour  · You notice any changes in your baby's movements  · You have abdominal cramps, pressure, or tightening  · You have a change in vaginal discharge  · You have chills or a fever  · You have vaginal itching, burning, or pain  · You have yellow, green, white, or foul-smelling vaginal discharge  · You have pain or burning when you urinate, less urine than usual, or pink or bloody urine  · You have questions or concerns about your condition or care  How to care for yourself at this stage of your pregnancy:   · Eat a variety of healthy foods  Healthy foods include fruits, vegetables, whole-grain breads, low-fat dairy foods, beans, lean meats, and fish  Drink liquids as directed  Ask how much liquid to drink each day and which liquids are best for you  Limit caffeine to less than 200 milligrams each day  Limit your intake of fish to 2 servings each week  Choose fish low in mercury such as canned light tuna, shrimp, salmon, cod, or tilapia  Do not  eat fish high in mercury such as swordfish, tilefish, sharon mackerel, and shark  · Take prenatal vitamins as directed    Your need for certain vitamins and minerals, such as folic acid, increases during pregnancy  Prenatal vitamins provide some of the extra vitamins and minerals you need  Prenatal vitamins may also help to decrease the risk of certain birth defects  · Rest as needed  Put your feet up if you have swelling in your ankles and feet  · Do not smoke  Smoking increases your risk of a miscarriage and other health problems during your pregnancy  Smoking can cause your baby to be born early or weigh less at birth  Ask your healthcare provider for information if you need help quitting  · Do not drink alcohol  Alcohol passes from your body to your baby through the placenta  It can affect your baby's brain development and cause fetal alcohol syndrome (FAS)  FAS is a group of conditions that causes mental, behavior, and growth problems  · Talk to your healthcare provider before you take any medicines  Many medicines may harm your baby if you take them when you are pregnant  Do not take any medicines, vitamins, herbs, or supplements without first talking to your healthcare provider  Never use illegal or street drugs (such as marijuana or cocaine) while you are pregnant  · Talk to your healthcare provider before you travel  You may not be able to travel in an airplane after 36 weeks  He may also recommend that you avoid long road trips  Safety tips:   · Avoid hot tubs and saunas  Do not use a hot tub or sauna while you are pregnant, especially during your first trimester  Hot tubs and saunas may raise your baby's temperature and increase the risk of birth defects  · Avoid toxoplasmosis  This is an infection caused by eating raw meat or being around infected cat feces  It can cause birth defects, miscarriages, and other problems  Wash your hands after you touch raw meat  Make sure any meat is well-cooked before you eat it  Avoid raw eggs and unpasteurized milk   Use gloves or ask someone else to clean your cat's litter box while you are pregnant  · Ask your healthcare provider about travel  The most comfortable time to travel is during the second trimester  Ask your healthcare provider if you can travel after 36 weeks  You may not be able to travel in an airplane after 36 weeks  He may also recommend that you avoid long road trips  Changes that are happening with your baby:  By 38 weeks, your baby may weigh between 6 and 9 pounds  Your baby may be about 14 inches long from the top of the head to the rump (baby's bottom)  Your baby hears well enough to know your voice  As your baby gets larger, you may feel fewer kicks and more stretching and rolling  Your baby may move into a head-down position  Your baby will also rest lower in your abdomen  What you need to know about prenatal care: Your healthcare provider will check your blood pressure and weight  You may also need the following:  · A urine test  may also be done to check for sugar and protein  These can be signs of gestational diabetes or infection  Protein in your urine may also be a sign of preeclampsia  Preeclampsia is a condition that can develop during week 20 or later of your pregnancy  It causes high blood pressure, and it can cause problems with your kidneys and other organs  · A blood test  may be done to check for anemia (low iron level)  · A Tdap vaccine  may be recommended by your healthcare provider  · A group B strep test  is a test that is done to check for group B strep infection  Group B strep is a type of bacteria that may be found in the vagina or rectum  It can be passed to your baby during delivery if you have it  Your healthcare provider will take swab your vagina or rectum and send the sample to the lab for tests  · Fundal height  is a measurement of your uterus to check your baby's growth  This number is usually the same as the number of weeks that you have been pregnant   Your healthcare provider may also check your baby's position  · Your baby's heart rate  will be checked  © Copyright [address] Information is for End User's use only and may not be sold, redistributed or otherwise used for commercial purposes  All illustrations and images included in CareNotes® are the copyrighted property of A D A M [employer] Inc  or [employer]   The above information is an  only  It is not intended as medical advice for individual conditions or treatments  Talk to your doctor, nurse or pharmacist before following any medical regimen to see if it is safe and effective for you

## 2021-02-27 ENCOUNTER — ANESTHESIA (INPATIENT)
Dept: LABOR AND DELIVERY | Facility: HOSPITAL | Age: 36
End: 2021-02-27
Payer: COMMERCIAL

## 2021-02-27 ENCOUNTER — ANESTHESIA EVENT (INPATIENT)
Dept: LABOR AND DELIVERY | Facility: HOSPITAL | Age: 36
End: 2021-02-27
Payer: COMMERCIAL

## 2021-02-27 ENCOUNTER — HOSPITAL ENCOUNTER (INPATIENT)
Facility: HOSPITAL | Age: 36
LOS: 3 days | Discharge: HOME | End: 2021-03-02
Attending: OBSTETRICS & GYNECOLOGY | Admitting: OBSTETRICS & GYNECOLOGY
Payer: COMMERCIAL

## 2021-02-27 ENCOUNTER — TELEPHONE (OUTPATIENT)
Dept: OTHER | Facility: OTHER | Age: 36
End: 2021-02-27

## 2021-02-27 DIAGNOSIS — Z3A.38 38 WEEKS GESTATION OF PREGNANCY: ICD-10-CM

## 2021-02-27 DIAGNOSIS — Z98.891 S/P PRIMARY LOW TRANSVERSE C-SECTION: Primary | ICD-10-CM

## 2021-02-27 PROBLEM — O99.213 OBESITY AFFECTING PREGNANCY IN THIRD TRIMESTER: Status: ACTIVE | Noted: 2020-10-29

## 2021-02-27 PROBLEM — O36.8130 DECREASED FETAL MOVEMENTS IN THIRD TRIMESTER: Status: ACTIVE | Noted: 2021-02-27

## 2021-02-27 LAB
ABO GROUP BLD: NORMAL
BASE EXCESS BLDCOA CALC-SCNC: -6.9 MMOL/L (ref 3–11)
BASE EXCESS BLDCOV CALC-SCNC: -3.1 MMOL/L (ref 1–9)
BASOPHILS # BLD AUTO: 0.03 THOUSANDS/ΜL (ref 0–0.1)
BASOPHILS NFR BLD AUTO: 1 % (ref 0–1)
BLD GP AB SCN SERPL QL: NEGATIVE
EOSINOPHIL # BLD AUTO: 0.07 THOUSAND/ΜL (ref 0–0.61)
EOSINOPHIL NFR BLD AUTO: 1 % (ref 0–6)
ERYTHROCYTE [DISTWIDTH] IN BLOOD BY AUTOMATED COUNT: 13.3 % (ref 11.6–15.1)
HCO3 BLDCOA-SCNC: 24.4 MMOL/L (ref 17.3–27.3)
HCO3 BLDCOV-SCNC: 23.1 MMOL/L (ref 12.2–28.6)
HCT VFR BLD AUTO: 35.6 % (ref 34.8–46.1)
HGB BLD-MCNC: 11.7 G/DL (ref 11.5–15.4)
IMM GRANULOCYTES # BLD AUTO: 0.05 THOUSAND/UL (ref 0–0.2)
IMM GRANULOCYTES NFR BLD AUTO: 1 % (ref 0–2)
LYMPHOCYTES # BLD AUTO: 2.81 THOUSANDS/ΜL (ref 0.6–4.47)
LYMPHOCYTES NFR BLD AUTO: 43 % (ref 14–44)
MCH RBC QN AUTO: 29.2 PG (ref 26.8–34.3)
MCHC RBC AUTO-ENTMCNC: 32.9 G/DL (ref 31.4–37.4)
MCV RBC AUTO: 89 FL (ref 82–98)
MONOCYTES # BLD AUTO: 0.46 THOUSAND/ΜL (ref 0.17–1.22)
MONOCYTES NFR BLD AUTO: 7 % (ref 4–12)
NEUTROPHILS # BLD AUTO: 3.15 THOUSANDS/ΜL (ref 1.85–7.62)
NEUTS SEG NFR BLD AUTO: 47 % (ref 43–75)
NRBC BLD AUTO-RTO: 0 /100 WBCS
O2 CT VFR BLDCOA CALC: 6 ML/DL
OXYHGB MFR BLDCOA: 27.4 %
OXYHGB MFR BLDCOV: 53.9 %
PCO2 BLDCOA: 77.1 MM[HG] (ref 30–60)
PCO2 BLDCOV: 45.4 MM HG (ref 27–43)
PH BLDCOA: 7.12 [PH] (ref 7.23–7.43)
PH BLDCOV: 7.32 [PH] (ref 7.19–7.49)
PLATELET # BLD AUTO: 273 THOUSANDS/UL (ref 149–390)
PMV BLD AUTO: 10.3 FL (ref 8.9–12.7)
PO2 BLDCOA: 17.4 MM HG (ref 5–25)
PO2 BLDCOV: 22.6 MM HG (ref 15–45)
RBC # BLD AUTO: 4.01 MILLION/UL (ref 3.81–5.12)
RH BLD: POSITIVE
SAO2 % BLDCOV: 11.8 ML/DL
SPECIMEN EXPIRATION DATE: NORMAL
WBC # BLD AUTO: 6.57 THOUSAND/UL (ref 4.31–10.16)

## 2021-02-27 PROCEDURE — 82805 BLOOD GASES W/O2 SATURATION: CPT | Performed by: OBSTETRICS & GYNECOLOGY

## 2021-02-27 PROCEDURE — 85025 COMPLETE CBC W/AUTO DIFF WBC: CPT | Performed by: OBSTETRICS & GYNECOLOGY

## 2021-02-27 PROCEDURE — 86900 BLOOD TYPING SEROLOGIC ABO: CPT | Performed by: OBSTETRICS & GYNECOLOGY

## 2021-02-27 PROCEDURE — 86850 RBC ANTIBODY SCREEN: CPT | Performed by: OBSTETRICS & GYNECOLOGY

## 2021-02-27 PROCEDURE — 86901 BLOOD TYPING SEROLOGIC RH(D): CPT | Performed by: OBSTETRICS & GYNECOLOGY

## 2021-02-27 PROCEDURE — 59510 CESAREAN DELIVERY: CPT | Performed by: OBSTETRICS & GYNECOLOGY

## 2021-02-27 PROCEDURE — 88307 TISSUE EXAM BY PATHOLOGIST: CPT | Performed by: PATHOLOGY

## 2021-02-27 PROCEDURE — 86592 SYPHILIS TEST NON-TREP QUAL: CPT | Performed by: OBSTETRICS & GYNECOLOGY

## 2021-02-27 PROCEDURE — NC001 PR NO CHARGE: Performed by: OBSTETRICS & GYNECOLOGY

## 2021-02-27 PROCEDURE — 99211 OFF/OP EST MAY X REQ PHY/QHP: CPT

## 2021-02-27 RX ORDER — OXYCODONE HYDROCHLORIDE 10 MG/1
10 TABLET ORAL EVERY 4 HOURS PRN
Status: DISCONTINUED | OUTPATIENT
Start: 2021-02-28 | End: 2021-03-02 | Stop reason: HOSPADM

## 2021-02-27 RX ORDER — OXYCODONE HYDROCHLORIDE 5 MG/1
5 TABLET ORAL EVERY 4 HOURS PRN
Status: DISCONTINUED | OUTPATIENT
Start: 2021-02-28 | End: 2021-03-02 | Stop reason: HOSPADM

## 2021-02-27 RX ORDER — METOCLOPRAMIDE HYDROCHLORIDE 5 MG/ML
5 INJECTION INTRAMUSCULAR; INTRAVENOUS EVERY 6 HOURS PRN
Status: ACTIVE | OUTPATIENT
Start: 2021-02-27 | End: 2021-02-28

## 2021-02-27 RX ORDER — EPHEDRINE SULFATE 50 MG/ML
INJECTION INTRAVENOUS AS NEEDED
Status: DISCONTINUED | OUTPATIENT
Start: 2021-02-27 | End: 2021-02-27

## 2021-02-27 RX ORDER — KETOROLAC TROMETHAMINE 30 MG/ML
INJECTION, SOLUTION INTRAMUSCULAR; INTRAVENOUS AS NEEDED
Status: DISCONTINUED | OUTPATIENT
Start: 2021-02-27 | End: 2021-02-27

## 2021-02-27 RX ORDER — HYDROMORPHONE HCL/PF 1 MG/ML
1 SYRINGE (ML) INJECTION EVERY 2 HOUR PRN
Status: DISCONTINUED | OUTPATIENT
Start: 2021-02-28 | End: 2021-03-02 | Stop reason: HOSPADM

## 2021-02-27 RX ORDER — BUPIVACAINE HYDROCHLORIDE 7.5 MG/ML
INJECTION, SOLUTION INTRASPINAL AS NEEDED
Status: DISCONTINUED | OUTPATIENT
Start: 2021-02-27 | End: 2021-02-27

## 2021-02-27 RX ORDER — SODIUM CHLORIDE, SODIUM LACTATE, POTASSIUM CHLORIDE, CALCIUM CHLORIDE 600; 310; 30; 20 MG/100ML; MG/100ML; MG/100ML; MG/100ML
125 INJECTION, SOLUTION INTRAVENOUS CONTINUOUS
Status: DISCONTINUED | OUTPATIENT
Start: 2021-02-27 | End: 2021-02-27

## 2021-02-27 RX ORDER — ONDANSETRON 2 MG/ML
4 INJECTION INTRAMUSCULAR; INTRAVENOUS EVERY 4 HOURS PRN
Status: DISPENSED | OUTPATIENT
Start: 2021-02-27 | End: 2021-02-28

## 2021-02-27 RX ORDER — FENTANYL CITRATE 50 UG/ML
INJECTION, SOLUTION INTRAMUSCULAR; INTRAVENOUS
Status: DISPENSED
Start: 2021-02-27 | End: 2021-02-28

## 2021-02-27 RX ORDER — OXYTOCIN/RINGER'S LACTATE 30/500 ML
62.5 PLASTIC BAG, INJECTION (ML) INTRAVENOUS CONTINUOUS
Status: DISPENSED | OUTPATIENT
Start: 2021-02-27 | End: 2021-02-28

## 2021-02-27 RX ORDER — DIPHENHYDRAMINE HYDROCHLORIDE 50 MG/ML
25 INJECTION INTRAMUSCULAR; INTRAVENOUS EVERY 6 HOURS PRN
Status: ACTIVE | OUTPATIENT
Start: 2021-02-27 | End: 2021-02-28

## 2021-02-27 RX ORDER — DOCUSATE SODIUM 100 MG/1
100 CAPSULE, LIQUID FILLED ORAL 2 TIMES DAILY
Status: DISCONTINUED | OUTPATIENT
Start: 2021-02-28 | End: 2021-03-02 | Stop reason: HOSPADM

## 2021-02-27 RX ORDER — MORPHINE SULFATE 0.5 MG/ML
INJECTION, SOLUTION EPIDURAL; INTRATHECAL; INTRAVENOUS
Status: COMPLETED
Start: 2021-02-27 | End: 2021-02-27

## 2021-02-27 RX ORDER — CALCIUM CARBONATE 200(500)MG
1000 TABLET,CHEWABLE ORAL DAILY PRN
Status: DISCONTINUED | OUTPATIENT
Start: 2021-02-27 | End: 2021-03-02 | Stop reason: HOSPADM

## 2021-02-27 RX ORDER — OXYTOCIN/RINGER'S LACTATE 30/500 ML
PLASTIC BAG, INJECTION (ML) INTRAVENOUS CONTINUOUS PRN
Status: DISCONTINUED | OUTPATIENT
Start: 2021-02-27 | End: 2021-02-27

## 2021-02-27 RX ORDER — DIPHENHYDRAMINE HYDROCHLORIDE 50 MG/ML
25 INJECTION INTRAMUSCULAR; INTRAVENOUS EVERY 6 HOURS PRN
Status: DISCONTINUED | OUTPATIENT
Start: 2021-02-27 | End: 2021-03-01

## 2021-02-27 RX ORDER — CEFAZOLIN SODIUM 1 G/50ML
SOLUTION INTRAVENOUS AS NEEDED
Status: DISCONTINUED | OUTPATIENT
Start: 2021-02-27 | End: 2021-02-27

## 2021-02-27 RX ORDER — OXYCODONE HYDROCHLORIDE AND ACETAMINOPHEN 5; 325 MG/1; MG/1
1 TABLET ORAL EVERY 6 HOURS PRN
Status: DISPENSED | OUTPATIENT
Start: 2021-02-27 | End: 2021-02-28

## 2021-02-27 RX ORDER — IBUPROFEN 600 MG/1
600 TABLET ORAL EVERY 6 HOURS PRN
Status: DISCONTINUED | OUTPATIENT
Start: 2021-02-28 | End: 2021-03-02 | Stop reason: HOSPADM

## 2021-02-27 RX ORDER — NALOXONE HYDROCHLORIDE 0.4 MG/ML
0.1 INJECTION, SOLUTION INTRAMUSCULAR; INTRAVENOUS; SUBCUTANEOUS
Status: ACTIVE | OUTPATIENT
Start: 2021-02-27 | End: 2021-02-28

## 2021-02-27 RX ORDER — ONDANSETRON 2 MG/ML
4 INJECTION INTRAMUSCULAR; INTRAVENOUS EVERY 8 HOURS PRN
Status: DISCONTINUED | OUTPATIENT
Start: 2021-02-27 | End: 2021-02-28

## 2021-02-27 RX ORDER — ONDANSETRON 2 MG/ML
4 INJECTION INTRAMUSCULAR; INTRAVENOUS EVERY 8 HOURS PRN
Status: DISCONTINUED | OUTPATIENT
Start: 2021-02-27 | End: 2021-02-27

## 2021-02-27 RX ORDER — KETOROLAC TROMETHAMINE 30 MG/ML
30 INJECTION, SOLUTION INTRAMUSCULAR; INTRAVENOUS EVERY 6 HOURS
Status: COMPLETED | OUTPATIENT
Start: 2021-02-27 | End: 2021-02-28

## 2021-02-27 RX ORDER — MORPHINE SULFATE 0.5 MG/ML
INJECTION, SOLUTION EPIDURAL; INTRATHECAL; INTRAVENOUS AS NEEDED
Status: DISCONTINUED | OUTPATIENT
Start: 2021-02-27 | End: 2021-02-27

## 2021-02-27 RX ORDER — DEXAMETHASONE SODIUM PHOSPHATE 4 MG/ML
8 INJECTION, SOLUTION INTRA-ARTICULAR; INTRALESIONAL; INTRAMUSCULAR; INTRAVENOUS; SOFT TISSUE ONCE AS NEEDED
Status: ACTIVE | OUTPATIENT
Start: 2021-02-27 | End: 2021-02-28

## 2021-02-27 RX ORDER — ACETAMINOPHEN 325 MG/1
650 TABLET ORAL EVERY 6 HOURS PRN
Status: DISCONTINUED | OUTPATIENT
Start: 2021-02-28 | End: 2021-03-02 | Stop reason: HOSPADM

## 2021-02-27 RX ADMIN — KETOROLAC TROMETHAMINE 15 MG: 30 INJECTION, SOLUTION INTRAMUSCULAR at 22:37

## 2021-02-27 RX ADMIN — SODIUM CHLORIDE, SODIUM LACTATE, POTASSIUM CHLORIDE, AND CALCIUM CHLORIDE 1000 ML: .6; .31; .03; .02 INJECTION, SOLUTION INTRAVENOUS at 20:35

## 2021-02-27 RX ADMIN — Medication 250 MILLI-UNITS/MIN: at 22:23

## 2021-02-27 RX ADMIN — PHENYLEPHRINE HYDROCHLORIDE 100 MCG: 10 INJECTION INTRAVENOUS at 22:16

## 2021-02-27 RX ADMIN — BUPIVACAINE HYDROCHLORIDE IN DEXTROSE 1.6 ML: 7.5 INJECTION, SOLUTION SUBARACHNOID at 22:05

## 2021-02-27 RX ADMIN — CEFAZOLIN SODIUM 3000 MG: 1 SOLUTION INTRAVENOUS at 21:56

## 2021-02-27 RX ADMIN — EPHEDRINE SULFATE 10 MG: 50 INJECTION, SOLUTION INTRAVENOUS at 22:16

## 2021-02-27 RX ADMIN — PHENYLEPHRINE HYDROCHLORIDE 100 MCG: 10 INJECTION INTRAVENOUS at 22:11

## 2021-02-27 RX ADMIN — Medication 62.5 MILLI-UNITS/MIN: at 23:59

## 2021-02-27 RX ADMIN — SODIUM CHLORIDE, SODIUM LACTATE, POTASSIUM CHLORIDE, AND CALCIUM CHLORIDE 125 ML/HR: .6; .31; .03; .02 INJECTION, SOLUTION INTRAVENOUS at 21:38

## 2021-02-27 RX ADMIN — MORPHINE SULFATE 0.15 MG: 0.5 INJECTION, SOLUTION EPIDURAL; INTRATHECAL; INTRAVENOUS at 22:05

## 2021-02-28 LAB
ERYTHROCYTE [DISTWIDTH] IN BLOOD BY AUTOMATED COUNT: 13.2 % (ref 11.6–15.1)
HCT VFR BLD AUTO: 30.2 % (ref 34.8–46.1)
HGB BLD-MCNC: 9.9 G/DL (ref 11.5–15.4)
MCH RBC QN AUTO: 28.9 PG (ref 26.8–34.3)
MCHC RBC AUTO-ENTMCNC: 32.8 G/DL (ref 31.4–37.4)
MCV RBC AUTO: 88 FL (ref 82–98)
PLATELET # BLD AUTO: 215 THOUSANDS/UL (ref 149–390)
PMV BLD AUTO: 10.2 FL (ref 8.9–12.7)
RBC # BLD AUTO: 3.42 MILLION/UL (ref 3.81–5.12)
WBC # BLD AUTO: 9.75 THOUSAND/UL (ref 4.31–10.16)

## 2021-02-28 PROCEDURE — 85027 COMPLETE CBC AUTOMATED: CPT | Performed by: OBSTETRICS & GYNECOLOGY

## 2021-02-28 PROCEDURE — 4A1HXCZ MONITORING OF PRODUCTS OF CONCEPTION, CARDIAC RATE, EXTERNAL APPROACH: ICD-10-PCS | Performed by: OBSTETRICS & GYNECOLOGY

## 2021-02-28 PROCEDURE — NC001 PR NO CHARGE: Performed by: STUDENT IN AN ORGANIZED HEALTH CARE EDUCATION/TRAINING PROGRAM

## 2021-02-28 PROCEDURE — 99024 POSTOP FOLLOW-UP VISIT: CPT | Performed by: OBSTETRICS & GYNECOLOGY

## 2021-02-28 RX ADMIN — ENOXAPARIN SODIUM 40 MG: 40 INJECTION SUBCUTANEOUS at 09:27

## 2021-02-28 RX ADMIN — ONDANSETRON 4 MG: 2 INJECTION INTRAMUSCULAR; INTRAVENOUS at 06:28

## 2021-02-28 RX ADMIN — OXYCODONE HYDROCHLORIDE AND ACETAMINOPHEN 1 TABLET: 5; 325 TABLET ORAL at 01:42

## 2021-02-28 RX ADMIN — OXYCODONE HYDROCHLORIDE AND ACETAMINOPHEN 1 TABLET: 5; 325 TABLET ORAL at 09:48

## 2021-02-28 RX ADMIN — KETOROLAC TROMETHAMINE 30 MG: 30 INJECTION, SOLUTION INTRAMUSCULAR; INTRAVENOUS at 16:08

## 2021-02-28 RX ADMIN — KETOROLAC TROMETHAMINE 30 MG: 30 INJECTION, SOLUTION INTRAMUSCULAR; INTRAVENOUS at 03:41

## 2021-02-28 RX ADMIN — KETOROLAC TROMETHAMINE 30 MG: 30 INJECTION, SOLUTION INTRAMUSCULAR; INTRAVENOUS at 09:26

## 2021-02-28 RX ADMIN — DOCUSATE SODIUM 100 MG: 100 CAPSULE, LIQUID FILLED ORAL at 09:26

## 2021-02-28 RX ADMIN — SODIUM CHLORIDE, SODIUM LACTATE, POTASSIUM CHLORIDE, AND CALCIUM CHLORIDE 1000 ML: .6; .31; .03; .02 INJECTION, SOLUTION INTRAVENOUS at 12:57

## 2021-02-28 RX ADMIN — DOCUSATE SODIUM 100 MG: 100 CAPSULE, LIQUID FILLED ORAL at 17:59

## 2021-02-28 RX ADMIN — ENOXAPARIN SODIUM 40 MG: 40 INJECTION SUBCUTANEOUS at 23:23

## 2021-02-28 RX ADMIN — KETOROLAC TROMETHAMINE 30 MG: 30 INJECTION, SOLUTION INTRAMUSCULAR; INTRAVENOUS at 23:23

## 2021-02-28 RX ADMIN — OXYCODONE HYDROCHLORIDE AND ACETAMINOPHEN 1 TABLET: 5; 325 TABLET ORAL at 19:50

## 2021-02-28 NOTE — DISCHARGE SUMMARY
CS Discharge Summary - Matt Damon 37 y o  female MRN: [id number]    Unit/Bed#: L&D 36-1 Encounter: [id number]    Admission Date: 2021     Discharge Date: 3/2/2021    Admitting Diagnosis:   Patient Active Problem List   Diagnosis    GERD without esophagitis    Esophageal dysphagia    Uterine fibroid during pregnancy, antepartum    Back pain affecting pregnancy in second trimester    38 weeks gestation of pregnancy    Obesity affecting pregnancy in third trimester    Pelvic pain in pregnancy    Decreased fetal movements in third trimester       Discharge Diagnosis:   Same, delivered      Procedures:   primary  section, low transverse incision    Admitting Attending: Dr Lucy Martell  Delivery Attending: Dr Lucy Martell  Discharge Attending: Dr Silvia Vaughan service: none  Consult attending: none    Hospital Course:     Matt Damon is a 37 y o  Ike Guppy who was admitted for  section secondary to nonreassuring fetal status remote from delivery with unfavorable cervix  She then underwent an uncomplicated  section delivery and delivered a viable female  on 2021 at 2222  APGARS were 9, 9 at 1 and 5 minutes, respectively   weighed 6lb 11 6oz      was then transferred to  nursery  Patient tolerated the procedure well and was transferred to recovery in stable condition  The patient's post partum course was notable for oliguria after delivery  IV hydration was continued postpartum and her guzman was removed POD#1 after her urine output improved  She was then able to spontaneously void  Preoperative hemaglobin was 11 7, postoperative was 9 9  Her postoperative pain was well controlled with oral analgesics  On day of discharge, she was ambulating and able to reasonably perform all ADLs  She was voiding and had appropriate bowel function  Pain was well controlled  She was discharged home on post-operative day #3 without complications   Patient was instructed to follow up with her OB as an outpatient and was given appropriate warnings to call provider if she develops signs of infection or uncontrolled pain  Complications:   None    Condition at discharge:   good     Provisions for Follow-Up Care:  See after visit summary for information related to follow-up care and any pertinent home health orders  Disposition:   Home    Planned Readmission:   No    Discharge Medications:   Prenatal vitamin daily for 6 months or the duration of nursing whichever is longer  Motrin 600 mg orally every 6 hours as needed for pain  Tylenol (over the counter) per bottle directions as needed for pain  Roxicodone 5 mg every 4 hours as needed for severe pain   Hydrocortisone cream 1% (over the counter) applied 1-2x daily to hemorrhoids as needed  Witch hazel pads for hemorrhoidal discomfort as needed    Discharge instructions :   -Do not place anything (no partner, tampons or douche) in your vagina for 6 weeks  -You may walk for exercise for the first 6 weeks then gradually return to your usual activities    -Please do not drive for 1 week if you have no stitches and for 2 weeks if you have stitches or underwent a  delivery     -You may take baths or shower per your preference    -Please look at your bust (breasts) in the mirror daily and call provider for redness or tenderness or increased warmth  - If you have had a  please look at your incision daily as well and call provider for increasing redness or steady drainage from the incision    -Please call your provider if temperature > 100 4*F or 38* C, worsening pain or a foul discharge

## 2021-02-28 NOTE — PROGRESS NOTES
Progress Note - OB/GYN   Maria Guadalupe Boland 37 y o  female MRN: [id number]  Unit/Bed#: L&D 312-01 Encounter: [id number]    Assessment:  Post partum/op Day #1 s/p 1LTCS for cat II FHT, remote from delivery, stable    Plan:  Continue routine post partum care  Encourage ambulation  Encourage breastfeeding  Ocampo catheter in place: UOP 350cc since 0200  Will give IVF bolus  Will f/u and plan for removal once increased UOP  QBL 654mL Hgb 11 7-->9 9   VSS   DVT ppx: SCDs, ambulation, Lovenox 40mg BID    Subjective/Objective   Chief Complaint:     Post delivery    Subjective:   Pt reports some nausea but no vomiting, states "not sure if I drank the water too fast "     Pain: yes, incisional, improved with meds  Tolerating PO: yes  Voiding: yes  Flatus: no  BM: no  Ambulating: has not been OOB since delivery  Breastfeeding:  yes  Chest pain: no  Shortness of breath: no  Leg pain: no  Lochia: minimal    Objective:     Vitals: /56 (BP Location: Right arm)   Pulse 90   Temp 98 °F (36 7 °C) (Oral)   Resp 20   Ht 5' 6" (1 676 m)   Wt 122 kg (270 lb)   LMP 06/05/2020 (Exact Date)   SpO2 95%   Breastfeeding Yes   BMI 43 58 kg/m²       Intake/Output Summary (Last 24 hours) at 2/28/2021 0825  Last data filed at 2/28/2021 0730  Gross per 24 hour   Intake 950 ml   Output 904 ml   Net 46 ml       Lab Results   Component Value Date    WBC 9 75 02/28/2021    HGB 9 9 (L) 02/28/2021    HCT 30 2 (L) 02/28/2021    MCV 88 02/28/2021     02/28/2021       Meds/Allergies   Current Facility-Administered Medications   Medication Dose Route Frequency    acetaminophen (TYLENOL) tablet 650 mg  650 mg Oral Q6H PRN    calcium carbonate (TUMS) chewable tablet 1,000 mg  1,000 mg Oral Daily PRN    dexamethasone (DECADRON) injection 8 mg  8 mg Intravenous Once PRN    diphenhydrAMINE (BENADRYL) injection 25 mg  25 mg Intravenous Q6H PRN    diphenhydrAMINE (BENADRYL) injection 25 mg  25 mg Intravenous Q6H PRN    docusate sodium (COLACE) capsule 100 mg  100 mg Oral BID    enoxaparin (LOVENOX) subcutaneous injection 40 mg  40 mg Subcutaneous Q12H [facility]    fentanyl citrate (PF) 100 MCG/2ML **ADS Override Pull**        HYDROmorphone (DILAUDID) injection 1 mg  1 mg Intravenous Q2H PRN    ibuprofen (MOTRIN) tablet 600 mg  600 mg Oral Q6H PRN    ketorolac (TORADOL) injection 30 mg  30 mg Intravenous Q6H    metoclopramide (REGLAN) injection 5 mg  5 mg Intravenous Q6H PRN    naloxone (NARCAN) injection 0 1 mg  0 1 mg Intravenous Q3 min PRN    ondansetron (ZOFRAN) injection 4 mg  4 mg Intravenous Q4H PRN    oxyCODONE (ROXICODONE) immediate release tablet 10 mg  10 mg Oral Q4H PRN    oxyCODONE (ROXICODONE) IR tablet 5 mg  5 mg Oral Q4H PRN    oxyCODONE-acetaminophen (PERCOCET) 5-325 mg per tablet 1 tablet  1 tablet Oral Q6H PRN       Physical Exam:     Gen: AAOx3, NAD  CV: RRR  Lungs: CTA b/l  Abd: Soft, non-tender, non-distended, no rebound or guarding  Uterine fundus firm and non-tender,  at the umbilicus   Incision: C/D/I without drainaged  Ext: Non tender, SCDs on     Arnie Angel MD  OBYESENIA, PGY-4  2/28/2021 8:29 AM

## 2021-02-28 NOTE — PLAN OF CARE
Problem: PAIN - ADULT  Goal: Verbalizes/displays adequate comfort level or baseline comfort level  Description: Interventions:  - Encourage patient to monitor pain and request assistance  - Assess pain using appropriate pain scale  - Administer analgesics based on type and severity of pain and evaluate response  - Implement non-pharmacological measures as appropriate and evaluate response  - Consider cultural and social influences on pain and pain management  - Notify physician/advanced practitioner if interventions unsuccessful or patient reports new pain  Outcome: Progressing     Problem: INFECTION - ADULT  Goal: Absence or prevention of progression during hospitalization  Description: INTERVENTIONS:  - Assess and monitor for signs and symptoms of infection  - Monitor lab/diagnostic results  - Monitor all insertion sites, i e  indwelling lines, tubes, and drains  - Monitor endotracheal if appropriate and nasal secretions for changes in amount and color  - [place] appropriate cooling/warming therapies per order  - Administer medications as ordered  - Instruct and encourage patient and family to use good hand hygiene technique  - Identify and instruct in appropriate isolation precautions for identified infection/condition  Outcome: Progressing  Goal: Absence of fever/infection during neutropenic period  Description: INTERVENTIONS:  - Monitor WBC    Outcome: Progressing     Problem: SAFETY ADULT  Goal: Patient will remain free of falls  Description: INTERVENTIONS:  - Assess patient frequently for physical needs  -  Identify cognitive and physical deficits and behaviors that affect risk of falls    -  [place] fall precautions as indicated by assessment   - Educate patient/family on patient safety including physical limitations  - Instruct patient to call for assistance with activity based on assessment  - Modify environment to reduce risk of injury  - Consider OT/PT consult to assist with strengthening/mobility  Outcome: Progressing  Goal: Maintain or return to baseline ADL function  Description: INTERVENTIONS:  -  Assess patient's ability to carry out ADLs; assess patient's baseline for ADL function and identify physical deficits which impact ability to perform ADLs (bathing, care of mouth/teeth, toileting, grooming, dressing, etc )  - Assess/evaluate cause of self-care deficits   - Assess range of motion  - Assess patient's mobility; develop plan if impaired  - Assess patient's need for assistive devices and provide as appropriate  - Encourage maximum independence but intervene and supervise when necessary  - Involve family in performance of ADLs  - Assess for home care needs following discharge   - Consider OT consult to assist with ADL evaluation and planning for discharge  - Provide patient education as appropriate  Outcome: Progressing  Goal: Maintain or return mobility status to optimal level  Description: INTERVENTIONS:  - Assess patient's baseline mobility status (ambulation, transfers, stairs, etc )    - Identify cognitive and physical deficits and behaviors that affect mobility  - Identify mobility aids required to assist with transfers and/or ambulation (gait belt, sit-to-stand, lift, walker, cane, etc )  - [place] fall precautions as indicated by assessment  - Record patient progress and toleration of activity level on Mobility SBAR; progress patient to next Phase/Stage  - Instruct patient to call for assistance with activity based on assessment  - Consider rehabilitation consult to assist with strengthening/weightbearing, etc   Outcome: Progressing     Problem: Knowledge Deficit  Goal: Patient/family/caregiver demonstrates understanding of disease process, treatment plan, medications, and discharge instructions  Description: Complete learning assessment and assess knowledge base    Interventions:  - Provide teaching at level of understanding  - Provide teaching via preferred learning methods  Outcome: Progressing     Problem: DISCHARGE PLANNING  Goal: Discharge to home or other facility with appropriate resources  Description: INTERVENTIONS:  - Identify barriers to discharge w/patient and caregiver  - Arrange for needed discharge resources and transportation as appropriate  - Identify discharge learning needs (meds, wound care, etc )  - Arrange for interpretive services to assist at discharge as needed  - Refer to Case Management Department for coordinating discharge planning if the patient needs post-hospital services based on physician/advanced practitioner order or complex needs related to functional status, cognitive ability, or social support system  Outcome: Progressing     Problem: POSTPARTUM  Goal: Experiences normal postpartum course  Description: INTERVENTIONS:  - Monitor maternal vital signs  - Assess uterine involution and lochia  Outcome: Progressing  Goal: Appropriate maternal -  bonding  Description: INTERVENTIONS:  - Identify family support  - Assess for appropriate maternal/infant bonding   -Encourage maternal/infant bonding opportunities  - Referral to  or  as needed  Outcome: Progressing  Goal: Establishment of infant feeding pattern  Description: INTERVENTIONS:  - Assess breast/bottle feeding  - Refer to lactation as needed  Outcome: Progressing  Goal: Incision(s), wounds(s) or drain site(s) healing without S/S of infection  Description: INTERVENTIONS  - Assess and document risk factors for skin impairment   - Assess and document dressing, incision, wound bed, drain sites and surrounding tissue  - Consider nutrition services referral as needed  - Oral mucous membranes remain intact  - Provide patient/ family education  Outcome: Progressing

## 2021-02-28 NOTE — OP NOTE
Section Procedure Note    Indications:   Non reassuring fetal status with unfavorable cervix    Pre-operative Diagnosis:   Patient Active Problem List   Diagnosis    GERD without esophagitis    Esophageal dysphagia    Uterine fibroid during pregnancy, antepartum    Back pain affecting pregnancy in second trimester    38 weeks gestation of pregnancy    Obesity affecting pregnancy in third trimester    Pelvic pain in pregnancy    Decreased fetal movements in third trimester     Post-operative Diagnosis:   Patient Active Problem List   Diagnosis    GERD without esophagitis    Esophageal dysphagia    Uterine fibroid during pregnancy, antepartum    Back pain affecting pregnancy in second trimester    38 weeks gestation of pregnancy    Obesity affecting pregnancy in third trimester    Pelvic pain in pregnancy    Decreased fetal movements in third trimester    S/P primary low transverse      Attending: Alvaro Rangel MD  Resident: Stephanie Wilson MD    Maternal Findings:  Normal uterus  Normal tubes and ovaries bilaterally  No adhesions  No difficulty noted from skin to delivery     Findings:  Viable female weighing 6lbs 11 6oz;  Apgar scores of 9 at one minute and 9 at five minutes     Clear amniotic fluid  Normal placenta with 3-vessel cord    Arterial and Venous Gases:  Umbilical Cord Venous Blood Gas:  Results from last 7 days   Lab Units 21  2218   PH COV  7 324   PCO2 COV mm HG 45 4*   HCO3 COV mmol/L 23 1   BASE EXC COV mmol/L -3 1*   O2 CT CD VB mL/dL 11 8   O2 HGB, VENOUS CORD % 61 9     Umbilical Cord Arterial Blood Gas:  Results from last 7 days   Lab Units 21  2218   PH COA  7 119*   PCO2 COA  77 1*   PO2 COA mm HG 17 4   HCO3 COA mmol/L 24 4   BASE EXC COA mmol/L -6 9*   O2 CONTENT CORD ART ml/dl 6 0   O2 HGB, ARTERIAL CORD % 27 4       Specimens: Arterial and venous cord gases, cord blood, segment of umbilical cord, placenta to storage    Quantitative Blood Loss: 654 mL    Drains: Ocampo catheter           Complications:  None; patient tolerated the procedure well  Disposition: Postpartum           Condition: stable    Brief Labor Course:   Patient was admitted for  section secondary to nonreassuring fetal status  Procedure Details   The patient was seen prior to the procedure  Risks, benefits, possible complications, alternate treatment options, and expected outcomes were discussed with the patient  The patient agreed with the proposed plan and gave informed consent for a  section  The patient was taken to the [facility] Operating Room where she received spinal anesthesia  For infection prophylaxis, she received Ancef 3g preoperatively  Fetal heart tones in the OR were assessed and noted to be within normal limits and a Ocampo catheter and SCDs were placed  The abdomen was prepped with Chloraprep, the vagina was prepped with Betadine, and following appropriate drying time, the patient was draped in the usual sterile manner  A Time Out was held and the above information confirmed  The patient was identified as Matt Damon and the procedure verified as a  Delivery  A Pfannenstiel incision was made and carried down through the underlying subcutaneous tissue to the fascia using a scalpel  Rectus fascia was then incised  We then proceeded in Anthony-Oneil fashion  All anatomic layers were well-demarcated  The rectus muscles were  and the peritoneum was identified, entered, and extended longitudinally with blunt dissection  The bladder blade was inserted  A low transverse uterine incision was made with the scalpel and extended laterally with blunt dissection  The amnion was entered bluntly  The fetal head was palpated and elevated but could not be delivered through the incision   Kiwi cup was applied to fetal head for less than 20 seconds prior to delivery of the fetal head through the incision followed by the body without difficulty  There was noted to be spontaneous cry and good tone  There was no apparent injury to the   The umbilical cord was doubly clamped and cut after 30 seconds to allow for delayed cord clamping  The infant was handed off to the  providers  Arterial and venous cord gases, cord blood, and a segment of umbilical cord were obtained for evaluation  The placenta delivered spontaneously with uterine fundal massage and appeared normal  The uterus was exteriorized and cleaned out with a moist lap sponge  The uterine incision was closed with a running locked suture of 0 Vicryl  A second layer of the same suture was used to imbricate the first   Hemostasis was noted to be excellent  The posterior culdesac was suctioned on clots  The uterus was returned to the abdomen  The paracolic gutters were inspected and cleared of all clots and debris with moist lap sponges  The rectus muscle was reapproximated with one mattress suture of 2-0 Chromic  The fascia was closed with a running suture of 0 Vicryl  Subcutaneous adipose tissue was closed with a running suture of 2-0 vicryl  The skin was closed with a subcuticular running suture of 4-0 Monocryl  Exofin was applied  The patient appeared to tolerate the procedure very well  Lap sponge, needle, and instrument counts were correct x2  The patient was transferred to her postpartum recovery room in stable condition and her infant went to the  nursery  Attending Attestation: Dr Jarrod Salter MD was present for the entire procedure  [facility] Jeanne Mancilla MD  OB/GYN  2021  12:40 AM

## 2021-02-28 NOTE — H&P
H&P Exam - Obstetrics   Simba Parry 37 y o  female MRN: [id number]  Unit/Bed#: L&D 326-01 Encounter: [id number]      History of Present Illness     Chief Complaint: Decreased fetal movement    HPI:  Simba Parry is a 37 y o  Anthonette Rucks female with an AVE of 3/12/2021, by Last Menstrual Period at 38w1d weeks gestation who is being admitted for nonreassuring fetal heart tones  Contractions: no  Loss of fluid: no  Vaginal bleeding: no  Fetal movement: no movement since 11am    [facility]  PREGNANCY COMPLICATIONS:   1) Uterine fibroid - last scan showed possible posterior fundal fibroid that measured 3 24 x 2 7 centimeters on 2021  2) Obesity in pregnancy - weight gain in pregnancy about 40lbs     OB History    Para Term  AB Living   1 0 0 0 0 0   SAB TAB Ectopic Multiple Live Births   0 0 0 0 0      # Outcome Date GA Lbr Mikey/2nd Weight Sex Delivery Anes PTL Lv   1 Current                Historical Information   Past Medical History:   Diagnosis Date    Gastritis     HPV (human papilloma virus) infection     Varicella      Past Surgical History:   Procedure Laterality Date    ANKLE SURGERY      COLPOSCOPY      MOUTH SURGERY      RI ESOPHAGOGASTRODUODENOSCOPY TRANSORAL DIAGNOSTIC N/A 2018    Procedure: ESOPHAGOGASTRODUODENOSCOPY (EGD); Surgeon: Dali Paniagua MD;  Location: [facility] GI LAB;   Service: Gastroenterology    TONSILLECTOMY      WISDOM TOOTH EXTRACTION       Social History   Social History     Substance and Sexual Activity   Alcohol Use Not Currently     Social History     Substance and Sexual Activity   Drug Use Never     Social History     Tobacco Use   Smoking Status Never Smoker   Smokeless Tobacco Never Used     Meds/Allergies      Medications Prior to Admission   Medication    fexofenadine (ALLEGRA) 180 MG tablet    omeprazole (PriLOSEC) 20 mg delayed release capsule    Prenatal MV-Min-Fe Fum-FA-DHA (PRENATAL 1 PO)    loratadine (CLARITIN) 10 mg tablet      No Known Allergies    OBJECTIVE:    Vitals: Blood pressure 101/69, temperature 98 3 °F (36 8 °C), temperature source Oral, resp  rate 18, height 5' 6" (1 676 m), weight 122 kg (270 lb), last menstrual period 06/05/2020, unknown if currently breastfeeding  Body mass index is 43 58 kg/m²  Physical Exam  Constitutional:       General: She is not in acute distress  Appearance: She is well-developed  She is not diaphoretic  HENT:      Head: Normocephalic and atraumatic  Cardiovascular:      Rate and Rhythm: Normal rate and regular rhythm  Heart sounds: Normal heart sounds  No murmur  No friction rub  No gallop  Pulmonary:      Effort: Pulmonary effort is normal  No respiratory distress  Breath sounds: Normal breath sounds  No wheezing or rales  Abdominal:      General: There is no distension  Palpations: Abdomen is soft  Tenderness: There is no abdominal tenderness  There is no guarding or rebound  Genitourinary:     Comments: Gravid uterus, nontender  Skin:     General: Skin is warm and dry  Neurological:      Mental Status: She is alert and oriented to person, place, and time  Psychiatric:         Behavior: Behavior normal          Thought Content:  Thought content normal          Judgment: Judgment normal          Cervix:   Closed, thick, high    Fetal heart rate:    150/ minimal to moderate/ no accelerations/ late & variable decelerations at random times    [place]:    none    EFW: 68%ile on 2/4/2021    GBS: negative    Prenatal Labs:   Blood Type:   Lab Results   Component Value Date/Time    ABO Grouping A 08/19/2020 07:03 AM     , D (Rh type):   Lab Results   Component Value Date/Time    Rh Type RH(D) POSITIVE 08/19/2020 07:03 AM     , Antibody Screen: negative , 1 hour Glucola:   Lab Results   Component Value Date/Time    Glucose 104 12/18/2020 08:09 AM   , Rubella: immune    , VDRL/RPR:   Lab Results   Component Value Date/Time    RPR NON-REACTIVE 08/19/2020 07:03 AM      , Hep B: NR , HIV:   Lab Results   Component Value Date/Time    HIV AG/AB, 4th Gen NON-REACTIVE 2020 07:03 AM         Invasive Devices     None                   Assessment/Plan     ASSESSMENT:  32yo  at 38w1d weeks gestation who is being admitted for nonreassuring fetal heart tones with late and variable decelerations and no accelerations  PLAN:   1) Admit   2) CBC, RPR, Blood Type   3) Will proceed to the OR for delivery given unfavorable cervix and NRFHT   ) Discussed with Dr Kevan Cook      This patient will be an INPATIENT  and I certify the anticipated length of stay is >2 Midnights  Amy Malik MD  2021  8:49 PM

## 2021-02-28 NOTE — PLAN OF CARE
Problem: BIRTH - VAGINAL/ SECTION  Goal: Fetal and maternal status remain reassuring during the birth process  Description: INTERVENTIONS:  - Monitor vital signs  - Monitor fetal heart rate  - Monitor uterine activity  - Monitor labor progression (vaginal delivery)  - DVT prophylaxis  - Antibiotic prophylaxis  Outcome: Completed  Goal: Emotionally satisfying birthing experience for mother/fetus  Description: Interventions:  - Assess, plan, implement and evaluate the nursing care given to the patient in labor  - Advocate the philosophy that each childbirth experience is a unique experience and support the family's chosen level of involvement and control during the labor process   - Actively participate in both the patient's and family's teaching of the birth process  - Consider cultural, Hindu and age-specific factors and plan care for the patient in labor  Outcome: Completed     Problem: POSTPARTUM  Goal: Experiences normal postpartum course  Description: INTERVENTIONS:  - Monitor maternal vital signs  - Assess uterine involution and lochia  Outcome: Progressing  Goal: Appropriate maternal -  bonding  Description: INTERVENTIONS:  - Identify family support  - Assess for appropriate maternal/infant bonding   -Encourage maternal/infant bonding opportunities  - Referral to  or  as needed  Outcome: Progressing  Goal: Establishment of infant feeding pattern  Description: INTERVENTIONS:  - Assess breast/bottle feeding  - Refer to lactation as needed  Outcome: Progressing  Goal: Incision(s), wounds(s) or drain site(s) healing without S/S of infection  Description: INTERVENTIONS  - Assess and document risk factors for skin impairment   - Assess and document dressing, incision, wound bed, drain sites and surrounding tissue  - Consider nutrition services referral as needed  - Oral mucous membranes remain intact  - Provide patient/ family education  Outcome: Progressing

## 2021-02-28 NOTE — LACTATION NOTE
This note was copied from a baby's chart  Met with parents to encourage breast feeding, mom states baby just finished feeding on both breasts  Mom states baby is latching better  Encouraged parents to call for assistance, questions, and concerns about breastfeeding  Extension provided

## 2021-02-28 NOTE — PLAN OF CARE
Problem: PAIN - ADULT  Goal: Verbalizes/displays adequate comfort level or baseline comfort level  Description: Interventions:  - Encourage patient to monitor pain and request assistance  - Assess pain using appropriate pain scale  - Administer analgesics based on type and severity of pain and evaluate response  - Implement non-pharmacological measures as appropriate and evaluate response  - Consider cultural and social influences on pain and pain management  - Notify physician/advanced practitioner if interventions unsuccessful or patient reports new pain  Outcome: Progressing     Problem: INFECTION - ADULT  Goal: Absence or prevention of progression during hospitalization  Description: INTERVENTIONS:  - Assess and monitor for signs and symptoms of infection  - Monitor lab/diagnostic results  - Monitor all insertion sites, i e  indwelling lines, tubes, and drains  - Monitor endotracheal if appropriate and nasal secretions for changes in amount and color  - [place] appropriate cooling/warming therapies per order  - Administer medications as ordered  - Instruct and encourage patient and family to use good hand hygiene technique  - Identify and instruct in appropriate isolation precautions for identified infection/condition  Outcome: Progressing  Goal: Absence of fever/infection during neutropenic period  Description: INTERVENTIONS:  - Monitor WBC    Outcome: Progressing     Problem: SAFETY ADULT  Goal: Patient will remain free of falls  Description: INTERVENTIONS:  - Assess patient frequently for physical needs  -  Identify cognitive and physical deficits and behaviors that affect risk of falls    -  [place] fall precautions as indicated by assessment   - Educate patient/family on patient safety including physical limitations  - Instruct patient to call for assistance with activity based on assessment  - Modify environment to reduce risk of injury  - Consider OT/PT consult to assist with strengthening/mobility  Outcome: Progressing  Goal: Maintain or return to baseline ADL function  Description: INTERVENTIONS:  -  Assess patient's ability to carry out ADLs; assess patient's baseline for ADL function and identify physical deficits which impact ability to perform ADLs (bathing, care of mouth/teeth, toileting, grooming, dressing, etc )  - Assess/evaluate cause of self-care deficits   - Assess range of motion  - Assess patient's mobility; develop plan if impaired  - Assess patient's need for assistive devices and provide as appropriate  - Encourage maximum independence but intervene and supervise when necessary  - Involve family in performance of ADLs  - Assess for home care needs following discharge   - Consider OT consult to assist with ADL evaluation and planning for discharge  - Provide patient education as appropriate  Outcome: Progressing  Goal: Maintain or return mobility status to optimal level  Description: INTERVENTIONS:  - Assess patient's baseline mobility status (ambulation, transfers, stairs, etc )    - Identify cognitive and physical deficits and behaviors that affect mobility  - Identify mobility aids required to assist with transfers and/or ambulation (gait belt, sit-to-stand, lift, walker, cane, etc )  - [place] fall precautions as indicated by assessment  - Record patient progress and toleration of activity level on Mobility SBAR; progress patient to next Phase/Stage  - Instruct patient to call for assistance with activity based on assessment  - Consider rehabilitation consult to assist with strengthening/weightbearing, etc   Outcome: Progressing     Problem: Knowledge Deficit  Goal: Patient/family/caregiver demonstrates understanding of disease process, treatment plan, medications, and discharge instructions  Description: Complete learning assessment and assess knowledge base    Interventions:  - Provide teaching at level of understanding  - Provide teaching via preferred learning methods  Outcome: Progressing     Problem: DISCHARGE PLANNING  Goal: Discharge to home or other facility with appropriate resources  Description: INTERVENTIONS:  - Identify barriers to discharge w/patient and caregiver  - Arrange for needed discharge resources and transportation as appropriate  - Identify discharge learning needs (meds, wound care, etc )  - Arrange for interpretive services to assist at discharge as needed  - Refer to Case Management Department for coordinating discharge planning if the patient needs post-hospital services based on physician/advanced practitioner order or complex needs related to functional status, cognitive ability, or social support system  Outcome: Progressing

## 2021-02-28 NOTE — PROGRESS NOTES
Pt with oliguria since c/s at 2222  UOP 0 3cc/kg/hr  Pt encouraged to increase PO fluids  Will give 1L IVF bolus  Hgb stable at 9 9 this AM  Will continue to monitor  Urine appears concentrated in guzman still per nursing       Vitals:    02/28/21 1100   BP: 110/72   Pulse: 89   Resp: 18   Temp: 97 8 °F (36 6 °C)   SpO2: 98%       Song Fagan MD  OBGYN, PGY-4  2/28/2021 12:57 PM

## 2021-02-28 NOTE — LACTATION NOTE
This note was copied from a baby's chart  Met with mother  Provided mother with Ready, Set, Baby booklet  Discussed Skin to Skin contact an benefits to mom and baby  Talked about the delay of the first bath until baby has adjusted  Spoke about the benefits of rooming in  Feeding on cue and what that means for recognizing infant's hunger  Avoidance of pacifiers for the first month discussed  Talked about exclusive breastfeeding for the first 6 months  Positioning and latch reviewed as well as showing images of other feeding positions  Discussed the properties of a good latch in any position  Reviewed hand/manual expression  Discussed s/s that baby is getting enough milk and some s/s that breastfeeding dyad may need further help  Gave information on common concerns, what to expect the first few weeks after delivery, preparing for other caregivers, and how partners can help  Resources for support also provided  Assisted mom to unwrap baby and place baby skin to skin in foot ball hold on right breast  Mom able to H E  large drops to facilitate latch  Baby sleepy  Waking strategies reviewed with teach back  Baby able to show signs of deep latch on and off sucking for 6 minutes  Encouraged mom to maintain skin to skin and observe for early feeding cues and attempt to latch baby  Early feeding cues reviewed  Encouraged parents to call for assistance, questions, and concerns about breastfeeding  Extension provided

## 2021-02-28 NOTE — ANESTHESIA PREPROCEDURE EVALUATION
Procedure:   SECTION () (N/A Uterus)    Relevant Problems   GI/HEPATIC   (+) Esophageal dysphagia   (+) GERD without esophagitis      GYN   (+) 38 weeks gestation of pregnancy        Physical Exam    Airway    Mallampati score: II  TM Distance: >3 FB  Neck ROM: full     Dental   No notable dental hx     Cardiovascular  Rhythm: regular, Rate: normal, Cardiovascular exam normal    Pulmonary  Pulmonary exam normal Breath sounds clear to auscultation,     Other Findings        Anesthesia Plan  ASA Score- 3 Emergent    Anesthesia Type- spinal with ASA Monitors  Additional Monitors:   Airway Plan:           Plan Factors-    Chart reviewed  Patient summary reviewed  Patient is not a current smoker  Patient not instructed to abstain from smoking on day of procedure  Patient did not smoke on day of surgery  Induction-     Postoperative Plan- Plan for postoperative opioid use  Informed Consent- Anesthetic plan and risks discussed with patient and spouse

## 2021-02-28 NOTE — ANESTHESIA PROCEDURE NOTES
Spinal Block    Patient location during procedure: OB  Reason for block: at surgeon's request and primary anesthetic  Staffing  Anesthesiologist: Itzel Dominguez DO  Preanesthetic Checklist  Completed: patient identified, site marked, surgical consent, pre-op evaluation, timeout performed, IV checked, risks and benefits discussed and monitors and equipment checked  Spinal Block  Patient position: sitting  Prep: Betadine  Patient monitoring: heart rate, continuous pulse ox and frequent blood pressure checks  Approach: midline  Location: L3-4  Injection technique: single-shot  Needle  Needle type: pencil-tip   Needle gauge: 25 G  Needle length: 10 cm  Assessment  Sensory level: T10  Injection Assessment:  negative aspiration for heme, no paresthesia on injection and positive aspiration for clear CSF    Post-procedure:  site cleaned

## 2021-03-01 LAB — RPR SER QL: NORMAL

## 2021-03-01 PROCEDURE — 99024 POSTOP FOLLOW-UP VISIT: CPT | Performed by: OBSTETRICS & GYNECOLOGY

## 2021-03-01 RX ORDER — DIPHENHYDRAMINE HCL 25 MG
25 TABLET ORAL EVERY 6 HOURS PRN
Status: DISCONTINUED | OUTPATIENT
Start: 2021-03-01 | End: 2021-03-02 | Stop reason: HOSPADM

## 2021-03-01 RX ADMIN — DOCUSATE SODIUM 100 MG: 100 CAPSULE, LIQUID FILLED ORAL at 09:20

## 2021-03-01 RX ADMIN — ENOXAPARIN SODIUM 40 MG: 40 INJECTION SUBCUTANEOUS at 20:36

## 2021-03-01 RX ADMIN — OXYCODONE HYDROCHLORIDE 5 MG: 5 TABLET ORAL at 13:15

## 2021-03-01 RX ADMIN — OXYCODONE HYDROCHLORIDE 5 MG: 5 TABLET ORAL at 17:39

## 2021-03-01 RX ADMIN — DOCUSATE SODIUM 100 MG: 100 CAPSULE, LIQUID FILLED ORAL at 17:39

## 2021-03-01 RX ADMIN — OXYCODONE HYDROCHLORIDE 10 MG: 10 TABLET ORAL at 23:28

## 2021-03-01 RX ADMIN — IBUPROFEN 600 MG: 600 TABLET ORAL at 14:31

## 2021-03-01 RX ADMIN — IBUPROFEN 600 MG: 600 TABLET ORAL at 06:36

## 2021-03-01 RX ADMIN — ENOXAPARIN SODIUM 40 MG: 40 INJECTION SUBCUTANEOUS at 09:20

## 2021-03-01 RX ADMIN — IBUPROFEN 600 MG: 600 TABLET ORAL at 20:39

## 2021-03-01 NOTE — PROGRESS NOTES
Progress Note - OB/GYN   Scott Covarrubias 37 y o  female MRN: [id number]  Unit/Bed#: L&D 312-01 Encounter: [id number]    ASSESSMENT:  Scott Covarrubias is a 37 y o   female, POD#2 s/p Primary low transverse  section at 38w1d  Recovering well  PLAN:  1  Post Op    - Continue routine post op care  - Pain control: PO meds on board   - BP's WNL  - UOP: voiding s/p oliguria after delivery   - Hgb: 11 7 --> 9 9   - Rh positive - Rhogam not indicated   - DVT ppx: SCDs, Lovenox   - Encourage ambulation  - Encourage breastfeeding  - Contraception: to be discussed with her attending   - Vaccines: n/a  - Anticipate discharge POD#3       SUBJECTIVE:  Pt feels well  She has no major complaints  Pain: improving, responding to PO meds  Tolerating PO: yes  Voiding: yes  Flatus: yes  Bm: no  Ambulating: yes  Breastfeeding: yes  Chest pain: no  Shortness of breath: no  Leg pain: no  Lochia: WNL    OBJECTIVE:  Vitals:    21 1100 21 1500 21 1909 21 2300   BP: 110/72 101/65 95/60 97/55   BP Location: Right arm Left arm Left arm Right arm   Pulse: 89 78 84 78   Resp: 18 20 18 18   Temp: 97 8 °F (36 6 °C) (!) 97 1 °F (36 2 °C) 97 9 °F (36 6 °C) 98 4 °F (36 9 °C)   TempSrc: Temporal Temporal Temporal Temporal   SpO2: 98% 95% 96% 96%   Weight:       Height:         Physical Exam:   Body mass index is 43 58 kg/m²  Constitutional: Well-developed, well-nourished  NAD  HEENT: NC/AT  Conjunctivae normal    Cardiovascular: RRR, S1/S2 normal    Pulmonary: Normal respiratory effort  Lung sounds CTAB  Abdominal: Soft, non-distended, non-tender  Incision c/d/i  Uterus firm below umbilicus  Surgical glue in place  MSK: Normal range of motion  Extremities: non-tender  Neurological: Alert and oriented to person, place, and time  Skin: Skin is warm and dry  I/O       701 -  0700  07 -  07 07 -  0700    P  O   920     I V  (mL/kg) 950 (7 8)      IV Piggyback  1000 Total Intake(mL/kg) 950 (7 8) 1920 (15 7)     Urine (mL/kg/hr) 200 2615 (0 9)     Blood 654      Total Output 854 2615     Net +96 -695                Lab Results   Component Value Date    WBC 9 75 02/28/2021    HGB 9 9 (L) 02/28/2021    HCT 30 2 (L) 02/28/2021    MCV 88 02/28/2021     02/28/2021       Mario Vieira MD  PGY-2, OB/GYN  3/1/2021 7:37 AM

## 2021-03-01 NOTE — LACTATION NOTE
This note was copied from a baby's chart  Mom called for assistance with breastfeeding  I demo  cross cradle hold on the left side and how to get a deep latch  Baby latched well, but mom c/o some discomfort  We broke the seal and got her latched a little better, mom still had some discomfort, but she verb it got better after a minute, so I reassured her this is normal for the first week  Baby fed for 15 minutes, which mom verb is the best she ever did on the left side  Enc to call for assistance as needed,phone # provided to call me

## 2021-03-01 NOTE — PLAN OF CARE
Problem: PAIN - ADULT  Goal: Verbalizes/displays adequate comfort level or baseline comfort level  Description: Interventions:  - Encourage patient to monitor pain and request assistance  - Assess pain using appropriate pain scale  - Administer analgesics based on type and severity of pain and evaluate response  - Implement non-pharmacological measures as appropriate and evaluate response  - Consider cultural and social influences on pain and pain management  - Notify physician/advanced practitioner if interventions unsuccessful or patient reports new pain  Outcome: Progressing     Problem: INFECTION - ADULT  Goal: Absence or prevention of progression during hospitalization  Description: INTERVENTIONS:  - Assess and monitor for signs and symptoms of infection  - Monitor lab/diagnostic results  - Monitor all insertion sites, i e  indwelling lines, tubes, and drains  - Monitor endotracheal if appropriate and nasal secretions for changes in amount and color  - [place] appropriate cooling/warming therapies per order  - Administer medications as ordered  - Instruct and encourage patient and family to use good hand hygiene technique  - Identify and instruct in appropriate isolation precautions for identified infection/condition  Outcome: Progressing  Goal: Absence of fever/infection during neutropenic period  Description: INTERVENTIONS:  - Monitor WBC    Outcome: Progressing     Problem: SAFETY ADULT  Goal: Patient will remain free of falls  Description: INTERVENTIONS:  - Assess patient frequently for physical needs  -  Identify cognitive and physical deficits and behaviors that affect risk of falls    -  [place] fall precautions as indicated by assessment   - Educate patient/family on patient safety including physical limitations  - Instruct patient to call for assistance with activity based on assessment  - Modify environment to reduce risk of injury  - Consider OT/PT consult to assist with strengthening/mobility  Outcome: Progressing  Goal: Maintain or return to baseline ADL function  Description: INTERVENTIONS:  -  Assess patient's ability to carry out ADLs; assess patient's baseline for ADL function and identify physical deficits which impact ability to perform ADLs (bathing, care of mouth/teeth, toileting, grooming, dressing, etc )  - Assess/evaluate cause of self-care deficits   - Assess range of motion  - Assess patient's mobility; develop plan if impaired  - Assess patient's need for assistive devices and provide as appropriate  - Encourage maximum independence but intervene and supervise when necessary  - Involve family in performance of ADLs  - Assess for home care needs following discharge   - Consider OT consult to assist with ADL evaluation and planning for discharge  - Provide patient education as appropriate  Outcome: Progressing  Goal: Maintain or return mobility status to optimal level  Description: INTERVENTIONS:  - Assess patient's baseline mobility status (ambulation, transfers, stairs, etc )    - Identify cognitive and physical deficits and behaviors that affect mobility  - Identify mobility aids required to assist with transfers and/or ambulation (gait belt, sit-to-stand, lift, walker, cane, etc )  - [place] fall precautions as indicated by assessment  - Record patient progress and toleration of activity level on Mobility SBAR; progress patient to next Phase/Stage  - Instruct patient to call for assistance with activity based on assessment  - Consider rehabilitation consult to assist with strengthening/weightbearing, etc   Outcome: Progressing     Problem: Knowledge Deficit  Goal: Patient/family/caregiver demonstrates understanding of disease process, treatment plan, medications, and discharge instructions  Description: Complete learning assessment and assess knowledge base    Interventions:  - Provide teaching at level of understanding  - Provide teaching via preferred learning methods  Outcome: Progressing     Problem: DISCHARGE PLANNING  Goal: Discharge to home or other facility with appropriate resources  Description: INTERVENTIONS:  - Identify barriers to discharge w/patient and caregiver  - Arrange for needed discharge resources and transportation as appropriate  - Identify discharge learning needs (meds, wound care, etc )  - Arrange for interpretive services to assist at discharge as needed  - Refer to Case Management Department for coordinating discharge planning if the patient needs post-hospital services based on physician/advanced practitioner order or complex needs related to functional status, cognitive ability, or social support system  Outcome: Progressing     Problem: POSTPARTUM  Goal: Experiences normal postpartum course  Description: INTERVENTIONS:  - Monitor maternal vital signs  - Assess uterine involution and lochia  Outcome: Progressing  Goal: Appropriate maternal -  bonding  Description: INTERVENTIONS:  - Identify family support  - Assess for appropriate maternal/infant bonding   -Encourage maternal/infant bonding opportunities  - Referral to  or  as needed  Outcome: Progressing  Goal: Establishment of infant feeding pattern  Description: INTERVENTIONS:  - Assess breast/bottle feeding  - Refer to lactation as needed  Outcome: Progressing  Goal: Incision(s), wounds(s) or drain site(s) healing without S/S of infection  Description: INTERVENTIONS  - Assess and document risk factors for skin impairment   - Assess and document dressing, incision, wound bed, drain sites and surrounding tissue  - Consider nutrition services referral as needed  - Oral mucous membranes remain intact  - Provide patient/ family education  Outcome: Progressing

## 2021-03-01 NOTE — PLAN OF CARE
Problem: PAIN - ADULT  Goal: Verbalizes/displays adequate comfort level or baseline comfort level  Description: Interventions:  - Encourage patient to monitor pain and request assistance  - Assess pain using appropriate pain scale  - Administer analgesics based on type and severity of pain and evaluate response  - Implement non-pharmacological measures as appropriate and evaluate response  - Consider cultural and social influences on pain and pain management  - Notify physician/advanced practitioner if interventions unsuccessful or patient reports new pain  Outcome: Progressing     Problem: INFECTION - ADULT  Goal: Absence or prevention of progression during hospitalization  Description: INTERVENTIONS:  - Assess and monitor for signs and symptoms of infection  - Monitor lab/diagnostic results  - Monitor all insertion sites, i e  indwelling lines, tubes, and drains  - Monitor endotracheal if appropriate and nasal secretions for changes in amount and color  - [place] appropriate cooling/warming therapies per order  - Administer medications as ordered  - Instruct and encourage patient and family to use good hand hygiene technique  - Identify and instruct in appropriate isolation precautions for identified infection/condition  Outcome: Progressing  Goal: Absence of fever/infection during neutropenic period  Description: INTERVENTIONS:  - Monitor WBC    Outcome: Progressing     Problem: SAFETY ADULT  Goal: Patient will remain free of falls  Description: INTERVENTIONS:  - Assess patient frequently for physical needs  -  Identify cognitive and physical deficits and behaviors that affect risk of falls    -  [place] fall precautions as indicated by assessment   - Educate patient/family on patient safety including physical limitations  - Instruct patient to call for assistance with activity based on assessment  - Modify environment to reduce risk of injury  - Consider OT/PT consult to assist with strengthening/mobility  Outcome: Progressing  Goal: Maintain or return to baseline ADL function  Description: INTERVENTIONS:  -  Assess patient's ability to carry out ADLs; assess patient's baseline for ADL function and identify physical deficits which impact ability to perform ADLs (bathing, care of mouth/teeth, toileting, grooming, dressing, etc )  - Assess/evaluate cause of self-care deficits   - Assess range of motion  - Assess patient's mobility; develop plan if impaired  - Assess patient's need for assistive devices and provide as appropriate  - Encourage maximum independence but intervene and supervise when necessary  - Involve family in performance of ADLs  - Assess for home care needs following discharge   - Consider OT consult to assist with ADL evaluation and planning for discharge  - Provide patient education as appropriate  Outcome: Progressing  Goal: Maintain or return mobility status to optimal level  Description: INTERVENTIONS:  - Assess patient's baseline mobility status (ambulation, transfers, stairs, etc )    - Identify cognitive and physical deficits and behaviors that affect mobility  - Identify mobility aids required to assist with transfers and/or ambulation (gait belt, sit-to-stand, lift, walker, cane, etc )  - [place] fall precautions as indicated by assessment  - Record patient progress and toleration of activity level on Mobility SBAR; progress patient to next Phase/Stage  - Instruct patient to call for assistance with activity based on assessment  - Consider rehabilitation consult to assist with strengthening/weightbearing, etc   Outcome: Progressing

## 2021-03-02 VITALS
HEART RATE: 77 BPM | TEMPERATURE: 98.1 F | DIASTOLIC BLOOD PRESSURE: 72 MMHG | SYSTOLIC BLOOD PRESSURE: 112 MMHG | RESPIRATION RATE: 16 BRPM | OXYGEN SATURATION: 98 % | HEIGHT: 66 IN | WEIGHT: 270 LBS | BODY MASS INDEX: 43.39 KG/M2

## 2021-03-02 PROBLEM — Z3A.38 38 WEEKS GESTATION OF PREGNANCY: Status: RESOLVED | Noted: 2020-10-29 | Resolved: 2021-03-02

## 2021-03-02 PROBLEM — D25.9 UTERINE FIBROID DURING PREGNANCY, ANTEPARTUM: Status: RESOLVED | Noted: 2020-09-17 | Resolved: 2021-03-02

## 2021-03-02 PROBLEM — O36.8130 DECREASED FETAL MOVEMENTS IN THIRD TRIMESTER: Status: RESOLVED | Noted: 2021-02-27 | Resolved: 2021-03-02

## 2021-03-02 PROBLEM — O99.213 OBESITY AFFECTING PREGNANCY IN THIRD TRIMESTER: Status: RESOLVED | Noted: 2020-10-29 | Resolved: 2021-03-02

## 2021-03-02 PROBLEM — O34.10 UTERINE FIBROID DURING PREGNANCY, ANTEPARTUM: Status: RESOLVED | Noted: 2020-09-17 | Resolved: 2021-03-02

## 2021-03-02 PROCEDURE — 99024 POSTOP FOLLOW-UP VISIT: CPT | Performed by: STUDENT IN AN ORGANIZED HEALTH CARE EDUCATION/TRAINING PROGRAM

## 2021-03-02 RX ORDER — ACETAMINOPHEN 325 MG/1
650 TABLET ORAL EVERY 6 HOURS PRN
Refills: 0 | COMMUNITY
Start: 2021-03-02 | End: 2021-04-01

## 2021-03-02 RX ORDER — IBUPROFEN 600 MG/1
600 TABLET ORAL EVERY 6 HOURS
Qty: 120 TABLET | Refills: 0 | Status: SHIPPED | OUTPATIENT
Start: 2021-03-02 | End: 2021-03-24

## 2021-03-02 RX ORDER — DOCUSATE SODIUM 100 MG/1
100 CAPSULE, LIQUID FILLED ORAL 2 TIMES DAILY
Qty: 60 CAPSULE | Refills: 0 | Status: SHIPPED | OUTPATIENT
Start: 2021-03-02 | End: 2021-04-01

## 2021-03-02 RX ORDER — OXYCODONE HYDROCHLORIDE 5 MG/1
5 TABLET ORAL EVERY 6 HOURS PRN
Qty: 10 TABLET | Refills: 0 | Status: SHIPPED | OUTPATIENT
Start: 2021-03-02 | End: 2021-03-12

## 2021-03-02 RX ADMIN — DOCUSATE SODIUM 100 MG: 100 CAPSULE, LIQUID FILLED ORAL at 08:02

## 2021-03-02 RX ADMIN — IBUPROFEN 600 MG: 600 TABLET ORAL at 08:02

## 2021-03-02 RX ADMIN — ENOXAPARIN SODIUM 40 MG: 40 INJECTION SUBCUTANEOUS at 10:00

## 2021-03-02 NOTE — PLAN OF CARE
Problem: PAIN - ADULT  Goal: Verbalizes/displays adequate comfort level or baseline comfort level  Description: Interventions:  - Encourage patient to monitor pain and request assistance  - Assess pain using appropriate pain scale  - Administer analgesics based on type and severity of pain and evaluate response  - Implement non-pharmacological measures as appropriate and evaluate response  - Consider cultural and social influences on pain and pain management  - Notify physician/advanced practitioner if interventions unsuccessful or patient reports new pain  Outcome: Progressing     Problem: INFECTION - ADULT  Goal: Absence or prevention of progression during hospitalization  Description: INTERVENTIONS:  - Assess and monitor for signs and symptoms of infection  - Monitor lab/diagnostic results  - Monitor all insertion sites, i e  indwelling lines, tubes, and drains  - Monitor endotracheal if appropriate and nasal secretions for changes in amount and color  - [place] appropriate cooling/warming therapies per order  - Administer medications as ordered  - Instruct and encourage patient and family to use good hand hygiene technique  - Identify and instruct in appropriate isolation precautions for identified infection/condition  Outcome: Progressing  Goal: Absence of fever/infection during neutropenic period  Description: INTERVENTIONS:  - Monitor WBC    Outcome: Progressing     Problem: SAFETY ADULT  Goal: Patient will remain free of falls  Description: INTERVENTIONS:  - Assess patient frequently for physical needs  -  Identify cognitive and physical deficits and behaviors that affect risk of falls    -  [place] fall precautions as indicated by assessment   - Educate patient/family on patient safety including physical limitations  - Instruct patient to call for assistance with activity based on assessment  - Modify environment to reduce risk of injury  - Consider OT/PT consult to assist with strengthening/mobility  Outcome: Progressing  Goal: Maintain or return to baseline ADL function  Description: INTERVENTIONS:  -  Assess patient's ability to carry out ADLs; assess patient's baseline for ADL function and identify physical deficits which impact ability to perform ADLs (bathing, care of mouth/teeth, toileting, grooming, dressing, etc )  - Assess/evaluate cause of self-care deficits   - Assess range of motion  - Assess patient's mobility; develop plan if impaired  - Assess patient's need for assistive devices and provide as appropriate  - Encourage maximum independence but intervene and supervise when necessary  - Involve family in performance of ADLs  - Assess for home care needs following discharge   - Consider OT consult to assist with ADL evaluation and planning for discharge  - Provide patient education as appropriate  Outcome: Progressing  Goal: Maintain or return mobility status to optimal level  Description: INTERVENTIONS:  - Assess patient's baseline mobility status (ambulation, transfers, stairs, etc )    - Identify cognitive and physical deficits and behaviors that affect mobility  - Identify mobility aids required to assist with transfers and/or ambulation (gait belt, sit-to-stand, lift, walker, cane, etc )  - [place] fall precautions as indicated by assessment  - Record patient progress and toleration of activity level on Mobility SBAR; progress patient to next Phase/Stage  - Instruct patient to call for assistance with activity based on assessment  - Consider rehabilitation consult to assist with strengthening/weightbearing, etc   Outcome: Progressing     Problem: Knowledge Deficit  Goal: Patient/family/caregiver demonstrates understanding of disease process, treatment plan, medications, and discharge instructions  Description: Complete learning assessment and assess knowledge base    Interventions:  - Provide teaching at level of understanding  - Provide teaching via preferred learning methods  Outcome: Progressing     Problem: DISCHARGE PLANNING  Goal: Discharge to home or other facility with appropriate resources  Description: INTERVENTIONS:  - Identify barriers to discharge w/patient and caregiver  - Arrange for needed discharge resources and transportation as appropriate  - Identify discharge learning needs (meds, wound care, etc )  - Arrange for interpretive services to assist at discharge as needed  - Refer to Case Management Department for coordinating discharge planning if the patient needs post-hospital services based on physician/advanced practitioner order or complex needs related to functional status, cognitive ability, or social support system  Outcome: Progressing     Problem: POSTPARTUM  Goal: Experiences normal postpartum course  Description: INTERVENTIONS:  - Monitor maternal vital signs  - Assess uterine involution and lochia  Outcome: Progressing  Goal: Appropriate maternal -  bonding  Description: INTERVENTIONS:  - Identify family support  - Assess for appropriate maternal/infant bonding   -Encourage maternal/infant bonding opportunities  - Referral to  or  as needed  Outcome: Progressing  Goal: Establishment of infant feeding pattern  Description: INTERVENTIONS:  - Assess breast/bottle feeding  - Refer to lactation as needed  Outcome: Progressing  Goal: Incision(s), wounds(s) or drain site(s) healing without S/S of infection  Description: INTERVENTIONS  - Assess and document risk factors for skin impairment   - Assess and document dressing, incision, wound bed, drain sites and surrounding tissue  - Consider nutrition services referral as needed  - Oral mucous membranes remain intact  - Provide patient/ family education  Outcome: Progressing

## 2021-03-02 NOTE — DISCHARGE INSTRUCTIONS
Section   WHAT YOU SHOULD KNOW:   A  delivery, or , is abdominal surgery to deliver your baby  There are many reasons you may need a   · A  may be scheduled before labor if you had a  with your last baby  It may be scheduled if your baby is not positioned normally, or you are pregnant with more than 1 baby  · Your caregiver may perform an emergency  during labor to prevent life-threatening complications for you or your baby  A  may be done if your cervix does not dilate after several hours of active labor  · Other reasons for a  include maternal infections and problems with the placenta  AFTER YOU LEAVE:   Medicines:   · Prescription pain medicine  may be given  Ask how to take this medicine safely  · Acetaminophen  decreases pain and fever  It is available without a doctor's order  Ask how much to take and how often to take it  Follow directions  Acetaminophen can cause liver damage if not taken correctly  · NSAIDs  help decrease swelling and pain or fever  This medicine is available with or without a doctor's order  NSAIDs can cause stomach bleeding or kidney problems in certain people  If you take blood thinner medicine, always ask your obstetrician if NSAIDs are safe for you  Always read the medicine label and follow directions  · Take your medicine as directed  Contact your obstetrician (OB) if you think your medicine is not helping or if you have side effects  Tell him if you are allergic to any medicine  Keep a list of the medicines, vitamins, and herbs you take  Include the amounts, and when and why you take them  Bring the list or the pill bottles to follow-up visits  Carry your medicine list with you in case of an emergency  Follow up with your OB as directed: You may need to return to have your stitches or staples removed  Write down your questions so you remember to ask them during your visits    Wound care:  Carefully wash your wound with soap and water every day  Keep your wound clean and dry  Wear loose, comfortable clothes that do not rub against your wound  Ask your OB about bathing and showering  Drink plenty of liquids: You can lower your risk for a blood clot if you drink plenty of liquids  Ask how much liquid to drink each day and which liquids are best for you  Limit activity until you have fully recovered from surgery:   · Ask when it is safe for you to drive, walk up stairs, lift heavy objects, and have sex  · Ask when it is okay to exercise, and what types of exercise to do  Start slowly and do more as you get stronger  Contact your OB if:   · You have heavy vaginal bleeding that fills 1 or more sanitary pads in 1 hour  · You have a fever  · Your incision is swollen, red, or draining pus  · You have questions or concerns about yourself or your baby  Seek care immediately or call 911 if:   · Blood soaks through your bandage  · Your stitches come apart  · You feel lightheaded, short of breath, and have chest pain  · You cough up blood  · Your arm or leg feels warm, tender, and painful  It may look swollen and red  © 2014 [address] is for End User's use only and may not be sold, redistributed or otherwise used for commercial purposes  All illustrations and images included in CareNotes® are the copyrighted property of [employer]  or Luis Garcia  The above information is an  only  It is not intended as medical advice for individual conditions or treatments  Talk to your doctor, nurse or pharmacist before following any medical regimen to see if it is safe and effective for you  COVID-19 (Coronavirus Disease 2019)   WHAT YOU NEED TO KNOW:   What do I need to know about coronavirus disease 2019 (COVID-19)? COVID-19 is the disease caused by the novel (new) coronavirus first discovered in December 2019  Coronaviruses generally cause upper respiratory (nose, throat, and lung) infections, such as a cold  The new virus can also cause serious lower respiratory conditions, such as pneumonia or acute respiratory distress syndrome (ARDS)  Anyone can develop serious problems from the new virus, but your risk is higher if you are 65 or older  A weak immune system, diabetes, or a heart or lung condition can also increase your risk  What are the signs and symptoms of COVID-19? You may not develop any signs or symptoms  Signs and symptoms that do develop usually start about 5 days after infection but can take 2 to 14 days  Signs and symptoms range from mild to severe  You may feel like you have the flu or a bad cold  Information on COVID-19 is still being learned  Tell your healthcare provider if you think you were infected but develop signs or symptoms not listed below:  · A cough    · Shortness of breath or trouble breathing that may become severe    · A fever of at least 100 4°F, or 38°C (may be lower in adults 65 or older)    · Chills that might include shaking    · Muscle pain, body aches, or a headache    · A sore throat    · Suddenly not being able to taste or smell anything    · Feeling mentally and physically tired (fatigue)    · Congestion (stuffy head and nose), or a runny nose    · Diarrhea, nausea, or vomiting    How is COVID-19 diagnosed? If you think you have COVID-19, call your healthcare provider  In some areas, testing is only done if a person has severe symptoms or is hospitalized  Testing is done more widely in other places  Your provider will tell you what to do based on your symptoms and the rules in your area  In general, the following may be used:  · A viral test  shows if you have a current infection  Samples are taken from your nose and throat, usually with swabs  You may need to wait several days to get the test results  Your healthcare provider will tell you how to get your results   You will need to quarantine (stay physically away from others) until you get your results  If results show you have COVID-19, you will need to quarantine until you are well  Your provider or other health official may give you more directions  You will also need to prevent another infection until it is known if you can get COVID-19 again  · An antibody test  shows if you had a past infection  Blood samples are used for this test  Antibodies are made by your immune system to attack the virus that causes COVID-19  Antibodies will form 1 to 3 weeks after you are infected  It is not known if antibodies prevent a second infection, or for how long a person might be protected  If you have antibodies, you will still need to be careful around others until more is known  · CT scans or x-rays  may be used to check for signs of pneumonia  The 2019 coronavirus causes a specific kind of pneumonia, usually in both lungs  How is COVID-19 treated? No medicine or specific treatment is currently approved for COVID-19  The following may be used to manage your symptoms or treat the effects of COVID-19:  · Mild symptoms  may get better on their own  If you do not need to be treated in a hospital, you will be given instructions to use at home  Your condition will be closely monitored  You will need to watch for worsening symptoms and seek immediate care if needed  Talk to your healthcare provider about the following:    ? Relieve your symptoms  To soothe a sore throat, gargle with warm salt water, or use throat lozenges or a throat spray  Your healthcare provider may recommend a cough medicine  Drink more liquids to thin and loosen mucus and to prevent dehydration  Use decongestants or saline drops as directed for nasal congestion  ? NSAIDs or acetaminophen  can help lower a fever and relieve body aches or a headache  Follow directions   If not taken correctly, NSAIDs can cause kidney damage and acetaminophen can cause liver damage  · Severe or life-threatening symptoms  are treated in the hospital  You may need a combination of the following:    ? Medicines  may be given to reduce inflammation or to fight the virus  You may also need blood thinners to prevent or treat blood clots  If you have a deep vein thrombosis (DVT) or pulmonary embolism (PE), you may need to keep using blood thinners for 3 months  ? Extra oxygen  may be given if you have respiratory failure  This means your lungs cannot get enough oxygen into your blood and out to your organs  Extra oxygen can help prevent organ failure  ? A ventilator  may be used to help you breathe  ? Convalescent plasma (part of blood)  from a patient who has recovered from COVID-19 may be used  The plasma contains antibodies that can help your body fight the infection  Convalescent plasma is only given to patients who have severe signs and symptoms  How does the 2019 coronavirus spread? The virus spreads quickly and easily  You can become infected if you are in contact with a large amount of the virus, even for a short time  You can also become infected by being around a small amount of virus for a long time  The following are ways the virus is thought to spread, but more information may be coming:  · Droplets are the most common way all coronaviruses spread  The virus can travel in droplets that form when a person talks, coughs, or sneezes  Anyone who breathes in the droplets or gets them in his or her eyes can become infected with the virus  Close personal contact with an infected person is thought to be the main way the virus spreads  Close personal contact means you are within 6 feet (2 meters) of the person  · Person-to-person contact can spread the virus  For example, a person with the virus on his or her hands can spread it by shaking hands with someone  At this time, it does not appear that the virus can be passed to a baby during pregnancy or delivery   The baby can be infected after he or she is born through person-to-person contact  The virus also does not appear to spread in breast milk  If you are pregnant or breastfeeding, talk to your healthcare provider or obstetrician about any concerns you have  · The virus can stay on objects and surfaces  A person can get the virus on his or her hands by touching the object or surface  Infection happens if the person then touches his or her eyes or mouth with unwashed hands  It is not yet known how long the virus can stay on an object or surface  That is why it is important to clean all surfaces that are used regularly  · An infected animal may be able to infect a person who touches it  This may happen at live markets or on a farm  How can everyone lower the risk for COVID-19? The best way to prevent infection is to avoid anyone who is infected, but this can be hard to do  An infected person can spread the virus before signs or symptoms begin, or even if signs or symptoms never develop  The following can help lower the risk for infection:      · Wash your hands often throughout the day  Use soap and water  Rub your soapy hands together, lacing your fingers  Wash the front and back of each hand, and in between your fingers  Use the fingers of one hand to scrub under the fingernails of the other hand  Wash for at least 20 seconds  Rinse with warm, running water for several seconds  Then dry your hands with a clean towel or paper towel  Use hand  that contains alcohol if soap and water are not available  Do not touch your eyes, nose, or mouth without washing your hands first  Teach children how to wash their hands and use hand   · Cover a sneeze or cough  This prevents droplets from traveling from you to others  Turn your face away and cover your mouth and nose with a tissue  Throw the tissue away  Use the bend of your arm if a tissue is not available   Then wash your hands well with soap and water or use hand   Turn and cover your face if you are around someone who is sneezing or coughing  Teach children how to cover a cough or sneeze  · Follow worldwide, national, and local social distancing guidelines  Social distancing means people avoid close physical contact so the virus cannot spread from one person to another  Keep at least 6 feet (2 meters) between you and others  Also keep this distance from anyone who comes to your home, such as someone making a delivery  · Make a habit of not touching your face  It is not known how long the virus can stay on objects and surfaces  If you get the virus on your hands, you can transfer it to your eyes, nose, or mouth and become infected  You can also transfer it to objects, surfaces, or people  Be aware of what you touch when you go out  Examples include handrails and elevator buttons  Try not to touch anything with bare hands unless it is necessary  Wash your hands before you leave your home and when you return  · Clean and disinfect high-touch surfaces and objects often  Use a disinfecting solution or wipes  You can make a solution by diluting 4 teaspoons of bleach in 1 quart (4 cups) of water  Clean and disinfect even if you think no one living in or coming to your home is infected with the virus  You can wipe items with a disinfecting cloth before you bring them into your home  Wash your hands after you handle anything you bring into your home  · Make your immune system as healthy as possible  A weakened immune system makes you more vulnerable to the new coronavirus  No COVID-19 vaccine is available yet  Vaccines such as the flu and pneumonia vaccines can help your immune system  Your healthcare provider can tell you which vaccines to get, and when to get them  Keep your immune system as strong as possible  Do not smoke  Eat healthy foods, exercise regularly, and try to manage stress   Go to bed and wake up at the same times each day      How do I follow social distancing guidelines to help lower the risk for COVID-19? National and local social distancing rules vary  Rules may change over time as restrictions are lifted  Restrictions may return if an outbreak happens where you live  It is important to know and follow all current social distancing rules in your area  The following are general guidelines:  · Limit trips out of your home  You may be able to have food, medicines, and other supplies delivered  If possible, have delivered items left at your door or other area  Try not to have someone hand you an item  You will be so close to the person that the virus can spread between you  · Do not have close physical contact with anyone who does not live in your home  Do not shake hands with, hug, or kiss a person as a greeting  Stand or walk as far from others as possible  If you must use public transportation (such as a bus or subway), try to sit or stand away from others  You can stay safely connected with others through phone calls, e-mail messages, social media websites, and video chats  Check in on anyone who may be having a hard time socially distancing, or who lives alone  Ask administrators at nursing homes or long-term care facilities how you can safely communicate with someone living there  · Wear a cloth face covering around others who do not live in your home  Face coverings help prevent the virus from spreading to others in droplets  You can use a clear face covering if someone needs to read your lips  This is a cloth covering that has plastic over the mouth area so your lips can be seen  Do not use coverings that have breathing valves or vents  The virus can travel out of the valve or vent and be spread to others  Do not take your covering off to talk, cough, or sneeze  Do not use coverings on children younger than 2 years or on anyone who has breathing problems or cannot remove it      · Only allow medical or other necessary professionals into your home  Wear your face covering, and remind professionals to wear a face covering  Remind them to wash their hands when they arrive and before they leave  Do not  let anyone who does not live in your home in, even if the person is not sick  A person can pass the virus to others before symptoms of COVID-19 begin  Some people never even develop symptoms  Children commonly have mild symptoms or no symptoms  It may be hard to tell a child not to hug or kiss you  Explain that this is how he or she can help you stay healthy  · Do not go to someone else's home unless it is necessary  Do not go over to visit, even if the person is lonely  Only go if you need to help him or her  Make sure you both wear face coverings while you are there  · Avoid large gatherings and crowds  Gatherings or crowds of 10 or more individuals can cause the virus to spread  Examples of gatherings include parties, sporting events, [employer]regational services, and conferences  Crowds may form at beaches, mir, and tourist attractions  Protect yourself by staying away from large gatherings and crowds  · Ask your healthcare provider for other ways to have appointments  You may be able to have appointments without having to go into the provider's office  Some providers offer phone, video, or other types of appointments  You may also be able to get prescriptions for a few months of your medicines at a time  · Stay safe if you must go out to work  You may have a job that can only be done outside your home  Keep physical distance between you and other workers as much as possible  Follow your employer's rules so everyone stays safe  What should I do if I have COVID-19 and am recovering at home? Healthcare providers will give you specific instructions to follow  The following are general guidelines to remind you how to keep others safe until you are well:  · Wash your hands often    Use soap and water as much as possible  You can use hand  that contains alcohol if soap and water are not available  Do not share towels with anyone  If you use paper towels, throw them away in a lined trash can kept in your room or area  Use a covered trash can, if possible  · Do not go out of your home unless it is necessary  You may have to go to your healthcare provider's office for check-ups or to get prescription refills  Do not arrive at the provider's office without an appointment  Providers have to make their offices safe for staff and other patients  · Do not have close physical contact with anyone unless it is necessary  Only have close physical contact with a person giving direct care, or a baby or child you must care for  Family members and friends should not visit you  If possible, stay in a separate area or room of your home if you live with others  No one should go into the area or room except to give you care  You can visit with others by phone, video chat, e-mail, or similar systems  It is important to stay connected with others in your life while you recover  · Wear a face covering while others are near you  This can help prevent droplets from spreading the virus when you talk, sneeze, or cough  Put the covering on before anyone comes into your room or area  Remind the person to cover his or her nose and mouth before going in to provide care for you  · Do not share items  Do not share dishes, towels, or other items with anyone  Items need to be washed after you use them  · Protect your baby  Wash your hands with soap and water often throughout the day  Wear a clean face covering while you breastfeed, or while you express or pump breast milk  If possible, ask someone who is well to care for your baby  You can put breast milk in bottles for the person to use, if needed  Talk to your healthcare provider if you have any questions or concerns about caring for or bonding with your baby   He or she will tell you when to bring your baby in for check-ups and vaccines  He or she will also tell you what to do if you think your baby was infected with the new virus  · Do not handle live animals  Until more is known, it is best not to touch, play with, or handle live animals  Some animals, including pets, have been infected with the new coronavirus  Do not handle or care for animals until you are well  Care includes feeding, petting, and cuddling your pet  Do not let your pet lick you or share your food  Ask someone who is not infected to take care of your pet, if possible  If you must care for a pet, wear a face covering  Wash your hands before and after you give care  · Follow directions from your healthcare provider for being around others after you recover  You will need to wait at least 10 days after symptoms first appeared  Then you will need to have no fever for 24 hours without fever medicine, and no other symptoms  A loss of taste or smell may continue for several months  It is considered okay to be around others if this is your only symptom  It is not known for sure if or for how long a recovered person can pass the virus to others  Your provider may give you instructions, such as continuing social distancing or wearing a face covering around others  How should I take care of someone who has COVID-19? If the person lives in another home, arrange for a time to give care  Remember to bring a few pairs of disposable gloves and a cloth face covering  The following are general guidelines to help you safely care for anyone who has COVID-19:  · Wash your hands often  Wash before and after you go into the person's home, area, or room  Throw paper towels away in a lined trash can that has a lid, if possible  · Do not allow others to go near the person  No one should come into the person's home unless it is necessary   If possible, the person should be in a separate area or room if he or she lives with others  Keep the room's door shut unless you need to go in or out  Have others call, video chat, or e-mail the person if he or she is feeling well enough  The person may feel lonely if he or she is kept separate for a long period of time  Safe communication can help him or her stay connected to family and friends  · Make sure the person's room has good air flow  You may be able to open the window if the weather allows  An air conditioner can also be turned on to help air move  · Contact the person before you go in to give care  Make sure the person is wearing a face covering  Remind him or her to wash his or her hands with soap and water  He or she can use hand  that contains alcohol if soap and water are not available  Put on a face covering before you go in to give care  · Wear gloves while you give care and clean  Clean items the person uses often  Clean countertops, cooking surfaces, and the fronts and insides of the microwave and refrigerator  Clean the shower, toilet, the area around the toilet, the sink, the area around the sink, and faucets  Gather used laundry or bedding  Wash and dry items on the warmest settings the fabric allows  Wash dishes and silverware in hot, soapy water or in a   · Anything you throw away needs to go into a lined trash can  When you need to empty the trash, close the open end of the lining and tie it closed  This helps prevent items the virus is on from spilling out of the trash  Remove your gloves and throw them away  Wash your hands  Where can I find more information? · Centers for Disease Control and Prevention  [address] , [address]  Phone: 0- [phone] - [phone]  Web Address: [employer] com     What should I do if I think I or someone I know may be infected?   Do the following to protect others:  · If emergency care is needed,  tell the  about the possible infection, or call ahead and tell the emergency department  · Call a healthcare provider  for instructions if symptoms are mild  Anyone who may be infected should not  arrive without calling first  The provider will need to protect staff members and other patients  · The person who may be infected needs to wear a face covering  while getting medical care  This will help lower the risk of infecting others  Coverings are not used for anyone who is younger than 2 years, has breathing problems, or cannot remove it  The provider can give you instructions for anyone who cannot wear a covering  Call your local emergency number (911 in the [address] [place] [address],[address]) or an emergency department if:   · You have trouble breathing or shortness of breath at rest     · You have chest pain or pressure that lasts longer than 5 minutes  · You become confused or hard to wake  · Your lips or face are blue  · You have a fever of 104°F (40°C) or higher  When should I call my doctor? · You do not  have symptoms of COVID-19 but had close physical contact within 14 days with someone who tested positive  · You have questions or concerns about your condition or care  CARE AGREEMENT:   You have the right to help plan your care  Learn about your health condition and how it may be treated  Discuss treatment options with your healthcare providers to decide what care you want to receive  You always have the right to refuse treatment  The above information is an  only  It is not intended as medical advice for individual conditions or treatments  Talk to your doctor, nurse or pharmacist before following any medical regimen to see if it is safe and effective for you  © Copyright [address] Information is for End User's use only and may not be sold, redistributed or otherwise used for commercial purposes   All illustrations and images included in CareNotes® are the copyrighted property of [employer]  or [employer]

## 2021-03-02 NOTE — LACTATION NOTE
This note was copied from a baby's chart  When I went back into room, dad was already finger feeding the 8 ml that mom pumped  They had some questions, which I answered and they verb being comfortable with feeding plan

## 2021-03-02 NOTE — PLAN OF CARE
Problem: PAIN - ADULT  Goal: Verbalizes/displays adequate comfort level or baseline comfort level  Description: Interventions:  - Encourage patient to monitor pain and request assistance  - Assess pain using appropriate pain scale  - Administer analgesics based on type and severity of pain and evaluate response  - Implement non-pharmacological measures as appropriate and evaluate response  - Consider cultural and social influences on pain and pain management  - Notify physician/advanced practitioner if interventions unsuccessful or patient reports new pain  Outcome: Adequate for Discharge     Problem: INFECTION - ADULT  Goal: Absence or prevention of progression during hospitalization  Description: INTERVENTIONS:  - Assess and monitor for signs and symptoms of infection  - Monitor lab/diagnostic results  - Monitor all insertion sites, i e  indwelling lines, tubes, and drains  - Monitor endotracheal if appropriate and nasal secretions for changes in amount and color  - [place] appropriate cooling/warming therapies per order  - Administer medications as ordered  - Instruct and encourage patient and family to use good hand hygiene technique  - Identify and instruct in appropriate isolation precautions for identified infection/condition  Outcome: Adequate for Discharge  Goal: Absence of fever/infection during neutropenic period  Description: INTERVENTIONS:  - Monitor WBC    Outcome: Adequate for Discharge     Problem: SAFETY ADULT  Goal: Patient will remain free of falls  Description: INTERVENTIONS:  - Assess patient frequently for physical needs  -  Identify cognitive and physical deficits and behaviors that affect risk of falls    -  [place] fall precautions as indicated by assessment   - Educate patient/family on patient safety including physical limitations  - Instruct patient to call for assistance with activity based on assessment  - Modify environment to reduce risk of injury  - Consider OT/PT consult to assist with strengthening/mobility  Outcome: Adequate for Discharge  Goal: Maintain or return to baseline ADL function  Description: INTERVENTIONS:  -  Assess patient's ability to carry out ADLs; assess patient's baseline for ADL function and identify physical deficits which impact ability to perform ADLs (bathing, care of mouth/teeth, toileting, grooming, dressing, etc )  - Assess/evaluate cause of self-care deficits   - Assess range of motion  - Assess patient's mobility; develop plan if impaired  - Assess patient's need for assistive devices and provide as appropriate  - Encourage maximum independence but intervene and supervise when necessary  - Involve family in performance of ADLs  - Assess for home care needs following discharge   - Consider OT consult to assist with ADL evaluation and planning for discharge  - Provide patient education as appropriate  Outcome: Adequate for Discharge  Goal: Maintain or return mobility status to optimal level  Description: INTERVENTIONS:  - Assess patient's baseline mobility status (ambulation, transfers, stairs, etc )    - Identify cognitive and physical deficits and behaviors that affect mobility  - Identify mobility aids required to assist with transfers and/or ambulation (gait belt, sit-to-stand, lift, walker, cane, etc )  - [place] fall precautions as indicated by assessment  - Record patient progress and toleration of activity level on Mobility SBAR; progress patient to next Phase/Stage  - Instruct patient to call for assistance with activity based on assessment  - Consider rehabilitation consult to assist with strengthening/weightbearing, etc   Outcome: Adequate for Discharge     Problem: Knowledge Deficit  Goal: Patient/family/caregiver demonstrates understanding of disease process, treatment plan, medications, and discharge instructions  Description: Complete learning assessment and assess knowledge base    Interventions:  - Provide teaching at level of understanding  - Provide teaching via preferred learning methods  Outcome: Adequate for Discharge     Problem: DISCHARGE PLANNING  Goal: Discharge to home or other facility with appropriate resources  Description: INTERVENTIONS:  - Identify barriers to discharge w/patient and caregiver  - Arrange for needed discharge resources and transportation as appropriate  - Identify discharge learning needs (meds, wound care, etc )  - Arrange for interpretive services to assist at discharge as needed  - Refer to Case Management Department for coordinating discharge planning if the patient needs post-hospital services based on physician/advanced practitioner order or complex needs related to functional status, cognitive ability, or social support system  Outcome: Adequate for Discharge     Problem: POSTPARTUM  Goal: Experiences normal postpartum course  Description: INTERVENTIONS:  - Monitor maternal vital signs  - Assess uterine involution and lochia  Outcome: Adequate for Discharge  Goal: Appropriate maternal -  bonding  Description: INTERVENTIONS:  - Identify family support  - Assess for appropriate maternal/infant bonding   -Encourage maternal/infant bonding opportunities  - Referral to  or  as needed  Outcome: Adequate for Discharge  Goal: Establishment of infant feeding pattern  Description: INTERVENTIONS:  - Assess breast/bottle feeding  - Refer to lactation as needed  Outcome: Adequate for Discharge  Goal: Incision(s), wounds(s) or drain site(s) healing without S/S of infection  Description: INTERVENTIONS  - Assess and document risk factors for skin impairment   - Assess and document dressing, incision, wound bed, drain sites and surrounding tissue  - Consider nutrition services referral as needed  - Oral mucous membranes remain intact  - Provide patient/ family education  Outcome: Adequate for Discharge

## 2021-03-02 NOTE — PROGRESS NOTES
Progress Note - OB/GYN   Natalia Mcgowan 37 y o  female MRN: [id number]  Unit/Bed#: L&D 312-01 Encounter: [id number]    ASSESSMENT:  Natalia Mcgowan is a 37 y o   female, POD#3 s/p Primary low transverse  section at 38w1d  Recovering well  Pt reports baby had rough night due to colic  PLAN:  1  Post Op    - Continue routine post op care  - Pain control: PO meds on board   - BP's WNL  - UOP: voiding s/p oliguria after delivery   - Hgb: 11 7 --> 9 9   - Rh positive - Rhogam not indicated   - DVT ppx: SCDs, Lovenox   - Encourage ambulation  - Continue breastfeeding  - Contraception: to be discussed with her attending   - Vaccines: n/a  - Anticipate discharge today      SUBJECTIVE:  Pt feels well  She has no major complaints  Pain: some at incision site, responding to PO meds  Tolerating PO: yes  Voiding: yes  Flatus: yes  Bm: no  Ambulating: yes  Breastfeeding: yes  Chest pain: no  Shortness of breath: no  Leg pain: no  Lochia: WNL    OBJECTIVE:  Vitals:    21 1100 21 1500 21 1900 21 2300   BP: 100/59 101/70 114/70 105/68   BP Location: Right arm Left arm Left arm Right arm   Pulse: 84 103 87 84   Resp: 18 18 18 18   Temp: 97 6 °F (36 4 °C) 97 9 °F (36 6 °C) 98 6 °F (37 °C) 97 9 °F (36 6 °C)   TempSrc: Temporal Temporal Temporal Temporal   SpO2: 97% 96% 98%    Weight:       Height:         Physical Exam:   Body mass index is 43 58 kg/m²  Constitutional: Well-developed, well-nourished  NAD  HEENT: NC/AT  Conjunctivae normal    Cardiovascular: RRR, S1/S2 normal    Pulmonary: Normal respiratory effort  Lung sounds CTAB  Abdominal: Soft, non-distended, non-tender  Incision c/d/i  Uterus firm below umbilicus  Surgical glue in place  MSK: Normal range of motion  Extremities: non-tender  Neurological: Alert and oriented to person, place, and time  Skin: Skin is warm and dry       No intake or output data in the 24 hours ending 21 8556    Lab Results   Component Value Date WBC 9 75 02/28/2021    HGB 9 9 (L) 02/28/2021    HCT 30 2 (L) 02/28/2021    MCV 88 02/28/2021     02/28/2021       Edward Gorman MD  PGY-2, OB/GYN  3/2/2021 6:37 AM

## 2021-03-02 NOTE — LACTATION NOTE
This note was copied from a baby's chart  Assisted mom with breastfeeding  Mom verb  that they had a bad night, baby did not latch all night long, but was fussy and crying  Mom hand expressed 1 ml of milk at 0600 and syringe fed it to baby  I assisted mom with breastfeeding on the right breast in the football hold  Baby has a hard time latching and maintaining her latch and mom c/o discomfort with latching  I assessed baby's tongue and it appeared that she had a tight frenulum  I discussed with parents the potential problems associated with tongue tie  We were able to get baby to latch for about 10 minutes, but then she fell asleep  I suggested to parents that we start pumping and supplementing baby until breastfeeding is better established  Parents agreeable  I demo  use and cleaning of breast pump and assisted mom with placing the breast pump  She pumped 8 ml of milk  I demo  to them how to finger feed the milk to baby  Baby took the 8 ml fairly well  I reinforced the feeding plan with parents, always latch first, then pump and supplement with up to 15 ml of pumped milk  Parents verb understaniding of instr  Enc to call for next feeding to get further assistance, phone # provided  I also recommended they make an appointment at the [address] [place] [address] after discharge, to get further assistance and to have her tongue evaluated further  Went over discharge breastfeeding booklet including the feeding log  Emphasized 8 or more (12) feedings in a 24 hour period, what to expect for the number of diapers per day of life and the progression of properties of the  stooling pattern  Reviewed breastfeeding and your lifestyle, storage and preparation of breast milk, how to keep you breast pump clean, the employed breastfeeding mother and paced bottle feeding handouts  Booklet included Breastfeeding Resources for after discharge including access to the number for the [address]

## 2021-03-02 NOTE — LACTATION NOTE
This note was copied from a baby's chart  Mom called for assistance with breastfeeding  She latched baby on the left side this feed, in the cross cradle hold and baby latched better than previous feedings  I could hear some swallowing  She fed for about 10 minutes and then mom switched her to right side in cross cradle hold and baby again latched fairly well  She was sleepier on this side and I did not hear as much swallowing  I recommended mom now pump and then I will have dad finger feed baby

## 2021-03-02 NOTE — PLAN OF CARE
Problem: PAIN - ADULT  Goal: Verbalizes/displays adequate comfort level or baseline comfort level  Description: Interventions:  - Encourage patient to monitor pain and request assistance  - Assess pain using appropriate pain scale  - Administer analgesics based on type and severity of pain and evaluate response  - Implement non-pharmacological measures as appropriate and evaluate response  - Consider cultural and social influences on pain and pain management  - Notify physician/advanced practitioner if interventions unsuccessful or patient reports new pain  Outcome: Progressing     Problem: INFECTION - ADULT  Goal: Absence or prevention of progression during hospitalization  Description: INTERVENTIONS:  - Assess and monitor for signs and symptoms of infection  - Monitor lab/diagnostic results  - Monitor all insertion sites, i e  indwelling lines, tubes, and drains  - Monitor endotracheal if appropriate and nasal secretions for changes in amount and color  - [place] appropriate cooling/warming therapies per order  - Administer medications as ordered  - Instruct and encourage patient and family to use good hand hygiene technique  - Identify and instruct in appropriate isolation precautions for identified infection/condition  Outcome: Progressing  Goal: Absence of fever/infection during neutropenic period  Description: INTERVENTIONS:  - Monitor WBC    Outcome: Progressing     Problem: SAFETY ADULT  Goal: Patient will remain free of falls  Description: INTERVENTIONS:  - Assess patient frequently for physical needs  -  Identify cognitive and physical deficits and behaviors that affect risk of falls    -  [place] fall precautions as indicated by assessment   - Educate patient/family on patient safety including physical limitations  - Instruct patient to call for assistance with activity based on assessment  - Modify environment to reduce risk of injury  - Consider OT/PT consult to assist with strengthening/mobility  Outcome: Progressing  Goal: Maintain or return to baseline ADL function  Description: INTERVENTIONS:  -  Assess patient's ability to carry out ADLs; assess patient's baseline for ADL function and identify physical deficits which impact ability to perform ADLs (bathing, care of mouth/teeth, toileting, grooming, dressing, etc )  - Assess/evaluate cause of self-care deficits   - Assess range of motion  - Assess patient's mobility; develop plan if impaired  - Assess patient's need for assistive devices and provide as appropriate  - Encourage maximum independence but intervene and supervise when necessary  - Involve family in performance of ADLs  - Assess for home care needs following discharge   - Consider OT consult to assist with ADL evaluation and planning for discharge  - Provide patient education as appropriate  Outcome: Progressing  Goal: Maintain or return mobility status to optimal level  Description: INTERVENTIONS:  - Assess patient's baseline mobility status (ambulation, transfers, stairs, etc )    - Identify cognitive and physical deficits and behaviors that affect mobility  - Identify mobility aids required to assist with transfers and/or ambulation (gait belt, sit-to-stand, lift, walker, cane, etc )  - [place] fall precautions as indicated by assessment  - Record patient progress and toleration of activity level on Mobility SBAR; progress patient to next Phase/Stage  - Instruct patient to call for assistance with activity based on assessment  - Consider rehabilitation consult to assist with strengthening/weightbearing, etc   Outcome: Progressing     Problem: Knowledge Deficit  Goal: Patient/family/caregiver demonstrates understanding of disease process, treatment plan, medications, and discharge instructions  Description: Complete learning assessment and assess knowledge base    Interventions:  - Provide teaching at level of understanding  - Provide teaching via preferred learning methods  Outcome: Progressing     Problem: DISCHARGE PLANNING  Goal: Discharge to home or other facility with appropriate resources  Description: INTERVENTIONS:  - Identify barriers to discharge w/patient and caregiver  - Arrange for needed discharge resources and transportation as appropriate  - Identify discharge learning needs (meds, wound care, etc )  - Arrange for interpretive services to assist at discharge as needed  - Refer to Case Management Department for coordinating discharge planning if the patient needs post-hospital services based on physician/advanced practitioner order or complex needs related to functional status, cognitive ability, or social support system  Outcome: Progressing     Problem: POSTPARTUM  Goal: Experiences normal postpartum course  Description: INTERVENTIONS:  - Monitor maternal vital signs  - Assess uterine involution and lochia  Outcome: Progressing  Goal: Appropriate maternal -  bonding  Description: INTERVENTIONS:  - Identify family support  - Assess for appropriate maternal/infant bonding   -Encourage maternal/infant bonding opportunities  - Referral to  or  as needed  Outcome: Progressing  Goal: Establishment of infant feeding pattern  Description: INTERVENTIONS:  - Assess breast/bottle feeding  - Refer to lactation as needed  Outcome: Progressing  Goal: Incision(s), wounds(s) or drain site(s) healing without S/S of infection  Description: INTERVENTIONS  - Assess and document risk factors for skin impairment   - Assess and document dressing, incision, wound bed, drain sites and surrounding tissue  - Consider nutrition services referral as needed  - Oral mucous membranes remain intact  - Provide patient/ family education  Outcome: Progressing

## 2021-03-03 ENCOUNTER — OFFICE VISIT (OUTPATIENT)
Dept: POSTPARTUM | Facility: CLINIC | Age: 36
End: 2021-03-03
Payer: COMMERCIAL

## 2021-03-03 VITALS — SYSTOLIC BLOOD PRESSURE: 118 MMHG | DIASTOLIC BLOOD PRESSURE: 72 MMHG

## 2021-03-03 DIAGNOSIS — O92.13 CRACKED NIPPLE ASSOCIATED WITH LACTATION: ICD-10-CM

## 2021-03-03 DIAGNOSIS — Z62.820 COUNSELING FOR PARENT-CHILD PROBLEM: Primary | ICD-10-CM

## 2021-03-03 DIAGNOSIS — O92.79 POOR LATCH ON, POSTPARTUM: ICD-10-CM

## 2021-03-03 DIAGNOSIS — Z71.89 COUNSELING FOR PARENT-CHILD PROBLEM: Primary | ICD-10-CM

## 2021-03-03 PROCEDURE — 99404 PREV MED CNSL INDIV APPRX 60: CPT | Performed by: PEDIATRICS

## 2021-03-03 NOTE — PATIENT INSTRUCTIONS
Tierra Brain is encouraged to:   Milk Supply:   - Offer the breast when early feeding cues occur   - Offer both breasts at every breastfeeding once at least twice every breastfeeding session   - Count session from beginning of first feed to beginning of next feed    Feedings:   - Use reverse pressure softening to reduce engorgement around the nipple   - Align the nipple to the nose, drag down to the chin to achieve a wide latch   - Utilize various positions demonstrated: laid back, cross cradle, [place]'s natural position to the breast   - utilize breast compressions to assist with milk transfer   - Pump / hand express prior to latch to assist with nipple erection  Position your baby facing you with your with your nipple at his nose  His ear, shoulder and hip should be in alignment  When he opens wide, move him onto the breast by applying pressure with your hand on his shoulders  - Watch the videos from [employer] and signs of a good latch    Additional:   - To help your nipples heal, in addition to paying close attention to latch, apply protective ointment after feeding or pumping and cover with an occlusive dressing like wax paper  Do this until your nipples have completely healed  - When feeding your expressed milk, use paced bottle feeding    This method is less stressful for your baby, prevents overfeeding and protects the breastfeeding relationship     - Increase skin to skin contact to help increase milk production, allow easy access to the breast for nutritive and non-nutritive sucking   · Nursing stool and pillow used for arm, breast, and infant support   · Paced bottle feeding techniques were demonstrated   · Horizontal bottle position and upright infant body position were taught  - watch the video from [employer] [facility]  · Paternal caregiver time includes: holding baby at laryngeal prominence, baby wearing, bath time,      Handouts:  Paced Bottle  Latch Check List  Engorgement  Tongue Tie    Follow up: March 10th @ 2 pm & an appt with Dr Gaudencio Lara on March 23rd @ 4 pm

## 2021-03-03 NOTE — PROGRESS NOTES
INITIAL BREAST FEEDING EVALUATION    Informant/Relationship: LETI and Juany    Discussion of General Lactation Issues: Painful latch with the left nipple having damage    Infant is 3days old today          History:  Fertility Problem:no  Breast changes:yes - bigger, darker nipple and larger  : in distress  So   Full term:yes - 38 weeks   labor:no  First nursing/attempt < 1 hour after birth:recovery room  Skin to skin following delivery:yes - at OR; has not still done at home  Breast changes after delivery:yes - larger, milk came in on day 4  Rooming in (infant in room with mother with exception of procedures, eg  Circumcision: yes - whole time  Blood sugar issues:no  NICU stay:no  Jaundice:no  Phototherapy:no  Supplement given: (list supplement and method used as well as reason(s):no    Past Medical History:   Diagnosis Date    Gastritis     HPV (human papilloma virus) infection     Varicella          Current Outpatient Medications:     acetaminophen (TYLENOL) 325 mg tablet, Take 2 tablets (650 mg total) by mouth every 6 (six) hours as needed for mild pain, headaches or fever, Disp: , Rfl: 0    docusate sodium (COLACE) 100 mg capsule, Take 1 capsule (100 mg total) by mouth 2 (two) times a day, Disp: 60 capsule, Rfl: 0    fexofenadine (ALLEGRA) 180 MG tablet, Take 180 mg by mouth daily, Disp: , Rfl:     ibuprofen (MOTRIN) 600 mg tablet, Take 1 tablet (600 mg total) by mouth every 6 (six) hours, Disp: 120 tablet, Rfl: 0    loratadine (CLARITIN) 10 mg tablet, Take 10 mg by mouth daily, Disp: , Rfl:     omeprazole (PriLOSEC) 20 mg delayed release capsule, Take 1 capsule (20 mg total) by mouth daily, Disp: 90 capsule, Rfl: 2    oxyCODONE (ROXICODONE) 5 mg immediate release tablet, Take 1 tablet (5 mg total) by mouth every 6 (six) hours as needed for severe pain for up to 10 daysMax Daily Amount: 20 mg, Disp: 10 tablet, Rfl: 0    Prenatal MV-Min-Fe Fum-FA-DHA (PRENATAL 1 PO), Take by mouth, Disp: , Rfl:   No current facility-administered medications for this visit  No Known Allergies    Social History     Substance and Sexual Activity   Drug Use Never       Social History     Interval Breastfeeding History:    Frequency of breast feedin times  Does mother feel breastfeeding is effective: If no, explain: painful latch  Does infant appear satisfied after nursing:If no, explain: still looking for breast milk  Stooling pattern normal: Yes - 3/4  Urinating frequently:Yes 3/4  Using shield or shells: No    Alternative/Artificial Feedings:   Bottle: No - pacifier use  - started an hour ago  Cup: No  Syringe/Finger: Yes, finger feeding started in hospital           Formula Type: n/a                     Amount: n/a            Breast Milk:                      Amount: 1/2 oz            Frequency Q 3-4 Hr between feedings  Elimination Problems: No      Equipment:  Nipple Shield             Type: n/a             Size: n/a             Frequency of Use: n/a  Pump            Type: Medela pump in style            Frequency of Use: Every 3 hours  Shells            Type: n/a            Frequency of use: n/a    Equipment Problems: no    Mom:  Breast: symmetrical, large conical shaped breasts with spacing between breast tissue  Bilateral areolas are darker in color with visible veining, stretch marks and ochoa glands  Nipple Assessment in General: bilateral arched scab on nipple face  Left nipple has open wound from 12-2  Nipples vicky with reverse pressure softening  Mother's Awareness of Feeding Cues                 Recognizes: Yes                  Verbalizes: Yes  Support System: FOB  History of Breastfeeding: first  Changes/Stressors/Violence: nipple; latch issues; engorgement; effectiveness of feeds  Concerns/Goals: Good latch; only breast milk;  Exclusive breast feed for one year    Problems with Mom: engorgement;  is sore and very difficulty for Wyatt Zamora to move; nipple pain and open sores    Physical Exam  Constitutional:       Appearance: Normal appearance  HENT:      Head: Normocephalic  Neck:      Musculoskeletal: Normal range of motion  Cardiovascular:      Rate and Rhythm: Normal rate  Pulses: Normal pulses  Pulmonary:      Effort: Pulmonary effort is normal    Musculoskeletal: Normal range of motion  Neurological:      General: No focal deficit present  Mental Status: She is alert and oriented to person, place, and time  Skin:     General: Skin is warm and dry  Capillary Refill: Capillary refill takes 2 to 3 seconds  Psychiatric:         Mood and Affect: Mood normal          Behavior: Behavior normal          Thought Content: Thought content normal          Judgment: Judgment normal          Infant:  Behaviors: hungry  Color: Pink and Jaundice  Birth weight: 2821 g  Current weight: 2720 g    Problems with infant: shallow latch, visible frenulum; jaundice      General Appearance:  Alert, active,hungry                             Head:  Normocephalic, AFOF, sutures opposed                            Eyes:   Conjunctiva clear, no drainage; yellow in sclera                            Ears:   Normally placed, no anomalies                           Nose:   Septum intact, no drainage or erythema                          Mouth:  No lesions  Top lip blister; palate presents with mild ridging; Tongue elevates, bilateral laterization; extends just behind lower alveolar ridge  Frenulum is thin, anteriorly placed  Upon digital suck exam, cupping of digit, with minimal loss of suction; peristalic movement present                   Neck:  Supple, symmetrical, trachea midline, no adenopathy; thyroid: no enlargement, symmetric, no tenderness/mass/nodules                Respiratory:  No grunting, flaring, retractions, breath sounds clear and equal           Cardiovascular:  Regular rate and rhythm  No murmur  Adequate perfusion/capillary refill   Femoral pulse present Abdomen:    Soft, non-tender, no masses, bowel sounds present, no HSM            Genitourinary:  Normal female genitalia, anus patent                         Spine:   No abnormalities noted       Musculoskeletal:   Full range of motion         Skin/Hair/Nails:   Skin warm, dry, and intact, no rashes or abnormal dyspigmentation or lesions               Neurologic:   No abnormal movement, tone appropriate for gestational age     Latch:  Efficiency:               Lips Flanged: No, top lip curls in              Depth of latch: shallow; nipple only; misaligned              Audible Swallow: Yes, copious amounts of milk              Visible Milk: Yes              Wide Open/ Asymmetrical: No, small mouth              Suck Swallow Cycle: Breathing: yes, Coordinated: yes  Nipple Assessment after latch: compressed  Latch Problems: shallow, misaligned latch; Juany lined up mouth to nipple, not nipple to nose  Position:  Infant's Ergonomics/Body               Body Alignment: No, baby's body is turned away               Head Supported: Yes               Close to Mom's body/ Lifted/ Supported: No               Mom's Ergonomics/Body: No                           Supported:  Yes                           Sitting Back: Yes                           Brings Baby to her breast: Yes, but misaligned  Positioning Problems: football on recliner at home; baby is positioned to low with chin touching chest      Handouts:   Engorgement, Paced bottle feeding and Latch checklist; tongue tie    Education:  Reviewed Latch: alignment of nipple to nose, drag down to chin to achieve a wide, deep latch; position baby up to breast level  Reviewed Positioning for Dyad: laid back, natural; cross cradle with pillows to support up to mom's breast  Reviewed Frequency/Supply & Demand: early feeding cues with time for non-nutritive suck on the breast; signs of satiation  Reviewed Infant:Cues and varied States of Awareness  Reviewed Infant Elimination: continue to monitor  Reviewed Alternative/Artificial Feedings: none  Reviewed Mom/Breast care: wet wound care; hand expression; breast compression  Reviewed Equipment: paced bottle feeding      Plan:  Jessica Barnes is encouraged to:   Milk Supply:   - Offer the breast when early feeding cues occur   - Offer both breasts at every breastfeeding once at least twice every breastfeeding session   - Count session from beginning of first feed to beginning of next feed    Feedings:   - Use reverse pressure softening to reduce engorgement around the nipple   - Align the nipple to the nose, drag down to the chin to achieve a wide latch   - Utilize various positions demonstrated: laid back, cross cradle, Wambach's natural position to the breast   - utilize breast compressions to assist with milk transfer   - Pump / hand express prior to latch to assist with nipple erection  Position your baby facing you with your with your nipple at his nose  His ear, shoulder and hip should be in alignment  When he opens wide, move him onto the breast by applying pressure with your hand on his shoulders  - Watch the videos from [employer] and signs of a good latch    Additional:   - To help your nipples heal, in addition to paying close attention to latch, apply protective ointment after feeding or pumping and cover with an occlusive dressing like wax paper  Do this until your nipples have completely healed  - When feeding your expressed milk, use paced bottle feeding    This method is less stressful for your baby, prevents overfeeding and protects the breastfeeding relationship     - Increase skin to skin contact to help increase milk production, allow easy access to the breast for nutritive and non-nutritive sucking   · Nursing stool and pillow used for arm, breast, and infant support   · Paced bottle feeding techniques were demonstrated   · Horizontal bottle position and upright infant body position were taught  - watch the video from [address] [place] [address]  · Paternal caregiver time includes: holding baby at laryngeal prominence, baby wearing, bath time,      Handouts:  Paced Bottle  Latch Check List  Engorgement  Tongue Tie    Follow up: March 10th @ 2 pm & an appt with Dr Sylvia Mock on March 23rd @ 4 pm      I have spent 90 minutes with Patient and family today in which greater than 50% of this time was spent in counseling/coordination of care regarding Patient and family education

## 2021-03-05 ENCOUNTER — TELEPHONE (OUTPATIENT)
Dept: POSTPARTUM | Facility: CLINIC | Age: 36
End: 2021-03-05

## 2021-03-05 NOTE — TELEPHONE ENCOUNTER
Sujatha Gonzalez called for Rebekah Del Rio - they have a question on Nipple shield use & would like to discuss  They were in this week for NP visit & are scheduled for follow up on Monday

## 2021-03-05 NOTE — TELEPHONE ENCOUNTER
Rodolfo answered the phone  Tia Nava states the baby has not latched since they were here for their initial consult  Tia Nava feels Yanelis Doe has flat nipples and went to [facility] and bought two nipple shields 24 and 20 mm  Tia Nava states since they used the 24 mm, baby latches to breast      They have been pumping, feeding from a bottle and giving the baby a pacifier since consult  Encouraged to latch baby when initial feeding cues are identified, use reverse pressure softening prior to latch, hand expression, & use hand compressions during feeding  Allow time for non-nutritive suck  Keep follow up on Monday to discuss with Debby about moving baby back to the breast and goals for breastfeeding

## 2021-03-07 NOTE — PROGRESS NOTES
I have reviewed the notes, assessments, and/or procedures performed by Sharda Ramsey MA, IBCLC, I concur with her/his documentation of Kimmie Mc MD 03/06/21

## 2021-03-08 ENCOUNTER — OFFICE VISIT (OUTPATIENT)
Dept: POSTPARTUM | Facility: CLINIC | Age: 36
End: 2021-03-08
Payer: COMMERCIAL

## 2021-03-08 ENCOUNTER — TELEPHONE (OUTPATIENT)
Dept: OBGYN CLINIC | Facility: MEDICAL CENTER | Age: 36
End: 2021-03-08

## 2021-03-08 VITALS — DIASTOLIC BLOOD PRESSURE: 78 MMHG | SYSTOLIC BLOOD PRESSURE: 114 MMHG

## 2021-03-08 DIAGNOSIS — Z71.89 ENCOUNTER FOR BREAST FEEDING COUNSELING: Primary | ICD-10-CM

## 2021-03-08 PROCEDURE — 99404 PREV MED CNSL INDIV APPRX 60: CPT | Performed by: PEDIATRICS

## 2021-03-08 NOTE — PATIENT INSTRUCTIONS
Nurse on demand: when baby gives hunger cues; when your breasts feel full, or at least every 3 hours counting from the beginning of one feeding to the beginning of the next; which ever comes first  When sucking and swallowing slow, gently compress the breast to restart flow  If active suck-swallow does not restart, gently remove the baby and offer the other breast; offering up to "four" breasts per feeding  Try latching without the nipple shield when Brennan Pupa is comfortable doing so but don't hesitate to use it if Mattie Caballero does not latch or feed effectively without it  Pay close attention to positioning for a deeper latch  Refer to the instructional video "Attaching Your Baby at the Breast" on the [address] [place] [address] website for further review  Feed expressed milk via paced bottle feeding as needed/desired  I suggest that Brennan Pupa pump several times a day as long as the nipple shield is being used  This will help maintain supply  When pumping, Cycle your pump through stimulation and expression mode several times in a session to stimulate several let downs  Use hands on pumping and hand expression to increase your output  Maintain your pump as recommended  Tummy Time is an important activity for your baby  This can help resolve structural issues that may be causing breastfeeding challenges  I suggest several brief periods of tummy time every day for your   "Five Essential Tummy Time Moves,How To Do Tummy Time" by [employer]  org and "Tummy Time For Newborns" by [employer], are two helpful videos which can be found on YouTube to help you get started  Follow up with lactation as scheduled  Follow up with Dr King Argue as scheduled  Call with questions or concerns

## 2021-03-08 NOTE — PROGRESS NOTES
BREAST FEEDING FOLLOW UP VISIT    Informant/Relationship: Fidelannamarie Alvarez and Rodolfo    Discussion of General Lactation Issues: Tapan Mendes continues to struggle to latch  A couple of days ago, Fidel Alvarez began using a nipple shield and Tapan Mendes is now latching and nursing for every feeding  Fidel Alvarez is concerned that Tapan Mendes struggles with milk flow at times  Fidel Alvarez feels her breasts don't feel as full since she started feeding with a shield  She is no longer pumping  Infant is 5days old today  Interval Breastfeeding History:    Frequency of breast feeding: On demand 10-12 times a day  Does mother feel breastfeeding is effective: Yes, but only with a nipple shield  Does infant appear satisfied after nursing:Yes, but only with a nipple shield  Stooling pattern normal:Yes  Urinating frequently:Yes  Using shield or shells: Yes    Alternative/Artificial Feedings:   Bottle: Yes, but not since Friday (3 days ago)  Cup: No  Syringe/Finger: No           Formula Type: none                     Amount: n/a            Breast Milk:                      Amount: 30-45ml            Frequency Q 1 5-3 Hr between feedings  Elimination Problems: No      Equipment:  Nipple Shield             Type: Medela and Lansinoh             Size: 20mm             Frequency of Use: For every feeding  Pump            Type: Medela PISA            Frequency of Use: none since Friday  Shells            Type: none            Frequency of use: n/a    Equipment Problems: no      Mom:  Breast: Medium sized symmetrical breasts  Rounded shape  Slightly widely spaced  Large areola in relation to the size of the breast   Skin tag noted on the right areola at about 3 o'clock  Nipple Assessment in General: slightly flat nipples vicky with stimulation  Mother's Awareness of Feeding Cues                 Recognizes:  Yes                  Verbalizes: Yes  Support System: FOB  History of Breastfeeding: none  Changes/Stressors/Violence: Fidel Alvarez is relieved that Tapan Mendes is now latching but is concerned that her breasts don't feel as full as when she was exclusively pumping  Concerns/Goals: Miguel Angel Brian desires to [facility]    Problems with Mom: Concerns with supply    Physical Exam  Constitutional:       Appearance: Normal appearance  HENT:      Head: Normocephalic and atraumatic  Neck:      Musculoskeletal: Normal range of motion and neck supple  Cardiovascular:      Rate and Rhythm: Normal rate and regular rhythm  Pulses: Normal pulses  Heart sounds: Normal heart sounds  Pulmonary:      Effort: Pulmonary effort is normal       Breath sounds: Normal breath sounds  Musculoskeletal: Normal range of motion  General: No swelling  Neurological:      Mental Status: She is alert and oriented to person, place, and time  Skin:     General: Skin is warm and dry  Psychiatric:         Mood and Affect: Mood normal          Behavior: Behavior normal          Thought Content: Thought content normal          Judgment: Judgment normal          Infant:  Behaviors: Alert  Color: Pink  Birth weight: 2821gram  Current weight: 2970gram    Problems with infant: Won't latch or nurse effectively without a nipple shield      General Appearance:  Alert, active, no distress                            Head:  Normocephalic, AFOF, sutures opposed, cephalohematoma on the crown of her head                            Eyes:   Conjunctiva clear, no drainage                            Ears:   Normally placed, no anomolies                           Nose:   no drainage or erythema                          Mouth:  No lesions  Tongue elevates and lateralizes well  Some cupping of my finger noted but very little effective peristalsis noted  The tongue primarily bunched up and the tongue retracted to expose the lower alveolar ridge                     Neck:  Supple, symmetrical, trachea midline                Respiratory:  No grunting, flaring, retractions, breath sounds clear and equal Cardiovascular:  Regular rate and rhythm  No murmur  Adequate perfusion/capillary refill  Femoral pulse present                  Abdomen:    Soft, non-tender, no masses, bowel sounds present, no HSM            Genitourinary:  Normal female genitalia, anus patent                         Spine:   No abnormalities noted       Musculoskeletal:   Full range of motion         Skin/Hair/Nails:   Skin warm, dry, and intact, no rashes or abnormal dyspigmentation or lesions               Neurologic:   No abnormal movement, tone appropriate for gestational age    Florence Latch:  Efficiency:               Lips Flanged: Yes              Depth of latch: deep briefly but not maintained  Audible Swallow: Yes, a few brief suckling bursts noted without a shield but Tiffanie Brown lost the latch periodically and needed to be relatched  Eventually she gave up and stopped trying  She was then latched with a nipple shield and fed well until she was content              Visible Milk: Yes              Wide Open/ Asymmetrical: Yes, without the shield              Suck Swallow Cycle: Breathing: unlabored, Coordinated: yes  Nipple Assessment after latch: Normal: elongated/eraser, no discoloration and no damage noted  Latch Problems: Even with good positioning, Tiffanie Brown could not sustain a deep latch for long without a nipple shield  With the shield, she nursed for quite some time and was completely content after feeding  Position:  Infant's Ergonomics/Body               Body Alignment: Yes               Head Supported: Yes               Close to Mom's body/ Lifted/ Supported: Yes               Mom's Ergonomics/Body: Yes                           Supported: Yes                           Sitting Back: Yes                           Brings Baby to her breast: Yes  Positioning Problems: None really  I did make a suggestion to adjust Juany's hand placement slightly to compress the breast in alignment with Constanza's mouth        Handouts: Hands on pumping and Hand expression    Education:  Reviewed Latch: Demonstrated how to gently compress the breast and align the baby so that her nose is just above the nipple with her lower lip and chin touching the breast to encourage the deepest, widest, off-center latch  Reviewed Positioning for Dyad: Demonstrated how to position baby "belly to belly" with mom  Reviewed Equipment: Discussed the use and features of the Medela PISA and the elements of hands on pumping  Reviewed the risks involved with nipple shield use and the importance of actively working to wean from the shield as soon as possible  Suggested some additional pumping to help maintain Juany's supply until Gale Palmer is nursing well without it  Plan:  Plan for breastfeeding    Reassurance and support given  Reviewed normal sucking patterns: transition from stimulation to nutritive to release or non-nutritive  [place] were taught how to assess for effective milk transfer  Demonstrated position to hold infant (state which ones)  I encouraged Abhishek Horowitz to position Gale Palmer "belly to belly" and to gently compress her breast to make a "sanwich" for her to help her latch deeply and effectively  Supplementation recommended (document method-education if necessary)  expressed milk via paced bottle feeding as needed/desired  Use of pump demonstrated  Abhishek Horowitz was taught how to use the cycles of her pump, the importance of hands on technique and how to determine best flange size  I encouraged Abhishek Horowitz to continue to attempt to latch without a nipple shield as she feels comfortable doing so  I encouraged additional pumping if she continues to use the nipple shield  I encouraged several brief periods of Tummy Time for Gale Palmer each day to help improve her ability to latch and feed well at the breast   A follow up appointment was scheduled for next week  They also have an appointment scheduled with Dr Gaudencio Lara for more evaluation                I have spent 60 minutes with Patient and family today in which greater than 50% of this time was spent in counseling/coordination of care regarding Patient and family education

## 2021-03-08 NOTE — TELEPHONE ENCOUNTER
Pt had a  with Dr Oglesby 21 and wanted to schedule a incision check after the procedure  Dr Oglesby's next available is 2021 in [place]  The pt is having redness and pain on top of the incision and wants to know if she can see another provider? Are we able to put her with someone else, and then schedule a visit with Yanelis Noble because she did have questions in regards to her procedure

## 2021-03-09 ENCOUNTER — OFFICE VISIT (OUTPATIENT)
Dept: OBGYN CLINIC | Facility: MEDICAL CENTER | Age: 36
End: 2021-03-09

## 2021-03-09 VITALS
WEIGHT: 264 LBS | BODY MASS INDEX: 42.43 KG/M2 | DIASTOLIC BLOOD PRESSURE: 80 MMHG | SYSTOLIC BLOOD PRESSURE: 120 MMHG | HEIGHT: 66 IN

## 2021-03-09 DIAGNOSIS — Z98.890 POSTOPERATIVE STATE: Primary | ICD-10-CM

## 2021-03-09 PROCEDURE — 99024 POSTOP FOLLOW-UP VISIT: CPT | Performed by: OBSTETRICS & GYNECOLOGY

## 2021-03-10 DIAGNOSIS — Z23 ENCOUNTER FOR IMMUNIZATION: ICD-10-CM

## 2021-03-12 ENCOUNTER — TELEPHONE (OUTPATIENT)
Dept: OBGYN CLINIC | Facility: MEDICAL CENTER | Age: 36
End: 2021-03-12

## 2021-03-12 DIAGNOSIS — R39.9 UTI SYMPTOMS: Primary | ICD-10-CM

## 2021-03-12 NOTE — TELEPHONE ENCOUNTER
----- Message from Marizol Call sent at 3/12/2021  4:11 AM EST -----  Regarding: Pre-Op/Post-Op Question  Contact: [phone]  Good morning,    I want to ask if its normal for by post-op to feel pain when I pee  Or if I should be concerned with having an UTI? Should I test for UTI? Thank you  Jessica Barnes

## 2021-03-13 NOTE — PROGRESS NOTES
I have reviewed the notes, assessments, and/or procedures performed by Austin York RN, IBCLC, I concur with her/his documentation of Evelio Amaral MD 03/12/21

## 2021-03-14 ENCOUNTER — IMMUNIZATIONS (OUTPATIENT)
Dept: FAMILY MEDICINE CLINIC | Facility: HOSPITAL | Age: 36
End: 2021-03-14

## 2021-03-14 DIAGNOSIS — Z23 ENCOUNTER FOR IMMUNIZATION: Primary | ICD-10-CM

## 2021-03-14 PROCEDURE — 91300 SARS-COV-2 / COVID-19 MRNA VACCINE (PFIZER-BIONTECH) 30 MCG: CPT

## 2021-03-14 PROCEDURE — 0001A SARS-COV-2 / COVID-19 MRNA VACCINE (PFIZER-BIONTECH) 30 MCG: CPT

## 2021-03-14 NOTE — PROGRESS NOTES
Wound     The wound is clean and dry   The area is not red or tender   She has no separation or infection       Will follow up in 6 weeks

## 2021-03-15 ENCOUNTER — OFFICE VISIT (OUTPATIENT)
Dept: POSTPARTUM | Facility: CLINIC | Age: 36
End: 2021-03-15
Payer: COMMERCIAL

## 2021-03-15 VITALS — SYSTOLIC BLOOD PRESSURE: 106 MMHG | DIASTOLIC BLOOD PRESSURE: 78 MMHG

## 2021-03-15 DIAGNOSIS — Z71.89 ENCOUNTER FOR BREAST FEEDING COUNSELING: Primary | ICD-10-CM

## 2021-03-15 LAB
APPEARANCE UR: CLEAR
COLOR UR: YELLOW
GLUCOSE UR QL STRIP: NEGATIVE
HGB UR QL STRIP: NEGATIVE
KETONES UR QL STRIP: NEGATIVE
PH UR STRIP: 5.5 [PH] (ref 5–8)
PROT UR QL STRIP: NEGATIVE
SP GR UR STRIP: 1.01 (ref 1–1.03)

## 2021-03-15 PROCEDURE — 99404 PREV MED CNSL INDIV APPRX 60: CPT | Performed by: PEDIATRICS

## 2021-03-15 NOTE — PROGRESS NOTES
BREAST FEEDING FOLLOW UP VISIT    Informant/Relationship: Christine Lynch and Adiel Garcia    Discussion of General Lactation Issues: Christine Lynch and Niecy Epstein are still struggling  Niecy Epstein has only latched without a nipple shield twice since our last visit  Christine Lynch feels [employer] well with the shield although sometimes it's painful and Christine Lynch has to relatch her  Infant is 3weeks old today  Interval Breastfeeding History:    Frequency of breast feeding: Every 2-3 hours  Does mother feel breastfeeding is effective: Yes  Does infant appear satisfied after nursing:Yes  Stooling pattern normal:Yes  Urinating frequently:Yes  Using shield or shells: Yes     Alternative/Artificial Feedings:   Bottle: Yes, occasionally  Cup: No  Syringe/Finger: No           Formula Type: none                     Amount: n/a            Breast Milk:                      Amount: 1 5            Frequency Q 2-3 Hr between feedings  Elimination Problems: No      Equipment:  Nipple Shield             Type: Medela             Size: 20mm             Frequency of Use: For most feedings  Pump            Type: Medela PISA            Frequency of Use: 2-3 times a day  Expresses 2-3 ounces per session  Shells            Type: none            Frequency of use: n/a    Equipment Problems: no    Mom:  Breast: Medium sized symmetrical breasts  Rounded shape  Slightly widely spaced  Large areola in relation to the size of the breast   Skin tag noted on the right areola at about 3 o'clock  Nipple Assessment in General: Everted nipples bilaterally  Mother's Awareness of Feeding Cues                 Recognizes: Yes                  Verbalizes: Yes  Support System: FOB  History of Breastfeeding: none  Changes/Stressors/Violence: Christine Lynch is feeling better about Niecy Epstein getting enough to eat but is frustrated that she does not latch without a nipple shield     Concerns/Goals: Christine Lynch desires to [facility]     Problems with Mom: Concerns with supply     Physical Exam  Constitutional: Appearance: Normal appearance  HENT:      Head: Normocephalic and atraumatic  Neck:      Musculoskeletal: Normal range of motion and neck supple  Cardiovascular:      Rate and Rhythm: Normal rate and regular rhythm  Pulses: Normal pulses  Heart sounds: Normal heart sounds  Pulmonary:      Effort: Pulmonary effort is normal       Breath sounds: Normal breath sounds  Musculoskeletal: Normal range of motion  General: No swelling  Neurological:      Mental Status: She is alert and oriented to person, place, and time  Skin:     General: Skin is warm and dry  Psychiatric:         Mood and Affect: Mood normal          Behavior: Behavior normal          Thought Content: Thought content normal          Judgment: Judgment normal       Infant:  Behaviors: Alert  Color: Pink  Birth weight: 2821gram  Current weight: 3260gram    Problems with infant: won't latch without a nipple shield    General Appearance:  Alert, active, no distress                            Head:  Normocephalic, AFOF, sutures opposed, cephalohematoma on the crown of her head                            Eyes:   Conjunctiva clear, no drainage, scleral hemorrhage OS                            Ears:   Normally placed, no anomolies                           Nose:   no drainage or erythema                          Mouth:  No lesions  Tongue elevates and lateralizes well  Very little cupping of my finger or sucking noted  Tongue primarily bunches up  Tongue retracts with every attempt to suck                            Neck:  Supple, symmetrical, trachea midline                Respiratory:  No grunting, flaring, retractions, breath sounds clear and equal           Cardiovascular:  Regular rate and rhythm  No murmur  Adequate perfusion/capillary refill   Femoral pulse present                  Abdomen:    Soft, non-tender, no masses, bowel sounds present, no HSM            Genitourinary:  Normal female genitalia, anus patent Spine:   No abnormalities noted       Musculoskeletal:   Full range of motion         Skin/Hair/Nails:   Skin warm, dry, and intact, no rashes or abnormal dyspigmentation or lesions               Neurologic:   No abnormal movement, tone appropriate for gestational age     Latch:  Efficiency:               Lips Flanged: Yes              Depth of latch: unmaintained, fair at best              Audible Swallow: Yes, a couple              Visible Milk: Yes              Wide Open/ Asymmetrical: Yes, briefly              Suck Swallow Cycle: Breathing: unlabored, Coordinated: yes  Nipple Assessment after latch: Normal: elongated/eraser, no discoloration and no damage noted  Latch Problems: Nandini Fowler did latch briefly without a nipple shield on the left breast   She was not able to maintain latch for more than a minute or so at a time  When she did suck, her jaw motion was very "choppy", rather than smooth  When she was latched with a nipple shield, latch was fair and several suckling bursts were noted and he sucking was much smoother  Position:  Infant's Ergonomics/Body               Body Alignment: Yes               Head Supported: Yes               Close to Mom's body/ Lifted/ Supported: Yes               Mom's Ergonomics/Body: Yes                           Supported: Yes                           Sitting Back: Yes                           Brings Baby to her breast: Yes  Positioning Problems: None      Handouts:   None    Education:  Reviewed Latch: Discussed how Nandini Fowler may have restricted tongue movement which is making it difficult for her to latch and suckle effectively        Plan:  Plan for breastfeeding    Reassurance and support given  I reassured Aida Brandon and Cornelius Adames that Nandini Fowler is growing well at this time  I encouraged them to continue with their current routine and follow up with Dr Little De La Garza next week               I have spent 60 minutes with Patient and family today in which [facility] than 50% of this time was spent in counseling/coordination of care regarding Patient and family education

## 2021-03-16 NOTE — PROGRESS NOTES
I have reviewed the notes, assessments, and/or procedures performed by Rory Carvalho RN, IBCLC, I concur with her/his documentation of Rizwan Carrera MD 03/16/21

## 2021-03-23 ENCOUNTER — OFFICE VISIT (OUTPATIENT)
Dept: POSTPARTUM | Facility: CLINIC | Age: 36
End: 2021-03-23

## 2021-03-23 VITALS — DIASTOLIC BLOOD PRESSURE: 70 MMHG | SYSTOLIC BLOOD PRESSURE: 108 MMHG

## 2021-03-23 DIAGNOSIS — O92.79 NURSING DIFFICULTY: ICD-10-CM

## 2021-03-23 NOTE — PROGRESS NOTES
BREAST FEEDING FOLLOW UP VISIT    Informant/Relationship: Liane Mccall and Rodolfo/mom and dad    Discussion of General Lactation Issues: Liane Mccall is using the nipple shield, without it Jessi Au does not latch  Liane Mccall states that even with the nipple shield she is experiencing pain and nipple damage  Jessi Au is exclusively breast fed directly with the nipple shield and Liane Mccall is seeing lots of wet and poopy diapers  Jessi Au has continued to pump 2-3 x/day and is noticing that she getting less  Infant is almost 2 weeks old today  Interval Breastfeeding History:    Frequency of breast feeding: every 2-3 hours, often more frequently  Does mother feel breastfeeding is effective: If no, explain: she only latches with the nipple shield  Does infant appear satisfied after nursing:Yes  Stooling pattern normal:Yes  Urinating frequently:Yes  Using shield or shells: Yes     Alternative/Artificial Feedings:   Bottle: Yes, occasionally, using paced bottle feeding  Cup: N/A  Syringe/Finger: N/A           Formula Type: n/a                     Amount: n/a            Breast Milk:                      Amount: 1-1 5 oz            Frequency Q 2-3 Hr between feedings  Elimination Problems: No      Equipment:  Nipple Shield             Type: Medela Contact             Size: 20             Frequency of Use: with each feeding  Pump            Type: Medela PISA            Frequency of Use: usually 2-3 x/day, may get 1-3 oz each time  Shells            Type: n/a            Frequency of use: n/a    Equipment Problems: yes concerned that she is getting less now than before      Mom:  Breast: Normal and Firm  Nipple Assessment in General: Normal: elongated/eraser, no discoloration and no damage noted  Mother's Awareness of Feeding Cues                 Recognizes:  Yes                  Verbalizes: Yes  Support System: FOB  History of Breastfeeding: None  Changes/Stressors/Violence: Pain with nursing and nursing with a nipple shield  Concerns/Goals: P    Problems with Mom: Sore nipples    Physical Exam  Constitutional:       Appearance: Normal appearance  She is well-developed and normal weight  HENT:      Head: Normocephalic and atraumatic  Eyes:      Extraocular Movements: Extraocular movements intact  Neck:      Musculoskeletal: Normal range of motion and neck supple  Thyroid: No thyromegaly  Cardiovascular:      Rate and Rhythm: Normal rate and regular rhythm  Pulses: Normal pulses  Heart sounds: Normal heart sounds  No murmur  Pulmonary:      Effort: Pulmonary effort is normal       Breath sounds: Normal breath sounds  Lymphadenopathy:      Cervical: No cervical adenopathy  Upper Body:      Right upper body: No pectoral adenopathy  Left upper body: No pectoral adenopathy  Neurological:      General: No focal deficit present  Mental Status: She is alert and oriented to person, place, and time  Psychiatric:         Mood and Affect: Mood normal          Behavior: Behavior normal          Thought Content: Thought content normal          Judgment: Judgment normal    Vitals signs and nursing note reviewed  Infant:  Behaviors: Alert  Color: Pink  Birth weight: 3 05 kg  Current weight: 3 665 kg    Problems with infant: Restricted tongue movement      General Appearance:  Alert, active, no distress                            Head:  Normocephalic, AFOF, sutures opposed                            Eyes:   Conjunctiva clear, no drainage                            Ears:   Normally placed, no anomolies                           Nose:   Septum intact, no drainage or erythema                          Mouth:  No lesions; Palate is slightly high and arched; Frenulum easily palpated with finger sweep; dimple noted in tip of tongue with extension just over lower lip, limited extension and lateralization;  Tongue pushes against examiner's finger when finger offered for sucking, once finger is in mouth, tongue does more flicking with a snap back than appropriate peristalsis with most of the suction generated posteriorly; tongue also pulls back leading to biting with lower alveolar ridge; Frenulum blanches with passive lift of the tongue and has broad, high insertion on lower alveolar ridge                   Neck:  Supple, symmetrical, trachea midline, no adenopathy; thyroid: no enlargement, symmetric, no tenderness/mass/nodules                Respiratory:  No grunting, flaring, retractions, breath sounds clear and equal           Cardiovascular:  Regular rate and rhythm  No murmur  Adequate perfusion/capillary refill  Femoral pulse present                  Abdomen:    Soft, non-tender, no masses, bowel sounds present, no HSM            Genitourinary:  Normal female genitalia, anus patent                         Spine:   No abnormalities noted       Musculoskeletal:   Full range of motion         Skin/Hair/Nails:   Skin warm, dry, and intact, no rashes or abnormal dyspigmentation or lesions               Neurologic:   No abnormal movement, tone appropriate for gestational age    [place] Latch:  Efficiency:               Lips Flanged: Yes, after frenotomy              Depth of latch: Good, following frenotomy              Audible Swallow: Yes, after frenotomy              Visible Milk: Yes, after frenotomy              Wide Open/ Asymmetrical: Yes, after frenotomy              Suck Swallow Cycle: Breathing: Unlabored, Coordinated: Yes  Nipple Assessment after latch: Normal: elongated/eraser, no discoloration and no damage noted  Latch Problems: Prior to the frenotomy, baby only latched with a shield  After the frenotomy, Fernando Marcano latched without a shield, with the breast gently compressed and developed a sustained suck and swallow nursing until satisfied  Position:  Infant's Ergonomics/Body               Body Alignment: Yes               Head Supported: Yes               Close to Mom's body/ Lifted/ Supported:  Yes Mom's Ergonomics/Body: Yes                           Supported: Yes                           Sitting Back: Yes                           Brings Baby to her breast: Yes  Positioning Problems: None          Education:  Reviewed Latch: Reviewed how to gently compress the breast as if offering a sandwich to facilitate a deeper latch  Reviewed Positioning for Dyad: Reviewed how to bring baby to the breast so that her lower lip and chin touch the breast with her nose just above the nipple to encourage a wider, more asymmetric latch  Reviewed Frequency/Supply & Demand: Recommended feeding on demand: when the baby gives hunger cues, when the breasts feel full, every 3 hours during the day and every 5 hours at night counting from the beginning of one feeding to the beginning of the next; whichever comes first    Reviewed Alternative/Artificial Feedings: Paced bottle feeding      Plan:  Discussed history and physical exams with Narcisa Saunders  Support given for her commitment to providing breast milk for her baby  Discussed the findings on the baby's exam consistent with tongue tie and reviewed how this may be the cause of nipple trauma, nipple pain, nipple damage, poor milk transfer, blocked ducts, mastitis, and loss of milk production  Discussed the science that supports performing the frenotomy to improve latch  I have spent 40 minutes with Patient and family today in which greater than 50% of this time was spent in counseling/coordination of care regarding Prognosis, Risks and benefits of tx options, Intructions for management, Patient and family education and Impressions

## 2021-03-24 DIAGNOSIS — Z98.891 S/P PRIMARY LOW TRANSVERSE C-SECTION: ICD-10-CM

## 2021-03-24 RX ORDER — IBUPROFEN 600 MG/1
TABLET ORAL
Qty: 120 TABLET | Refills: 0 | Status: SHIPPED | OUTPATIENT
Start: 2021-03-24 | End: 2021-04-30

## 2021-04-05 ENCOUNTER — IMMUNIZATIONS (OUTPATIENT)
Dept: FAMILY MEDICINE CLINIC | Facility: HOSPITAL | Age: 36
End: 2021-04-05

## 2021-04-05 DIAGNOSIS — Z23 ENCOUNTER FOR IMMUNIZATION: Primary | ICD-10-CM

## 2021-04-05 PROCEDURE — 91300 SARS-COV-2 / COVID-19 MRNA VACCINE (PFIZER-BIONTECH) 30 MCG: CPT

## 2021-04-05 PROCEDURE — 0002A SARS-COV-2 / COVID-19 MRNA VACCINE (PFIZER-BIONTECH) 30 MCG: CPT

## 2021-04-22 ENCOUNTER — POSTPARTUM VISIT (OUTPATIENT)
Dept: OBGYN CLINIC | Facility: MEDICAL CENTER | Age: 36
End: 2021-04-22

## 2021-04-22 VITALS
BODY MASS INDEX: 42.33 KG/M2 | HEIGHT: 66 IN | WEIGHT: 263.4 LBS | DIASTOLIC BLOOD PRESSURE: 76 MMHG | SYSTOLIC BLOOD PRESSURE: 112 MMHG

## 2021-04-22 DIAGNOSIS — Z30.011 ENCOUNTER FOR BCP (BIRTH CONTROL PILLS) INITIAL PRESCRIPTION: Primary | ICD-10-CM

## 2021-04-22 PROCEDURE — 99024 POSTOP FOLLOW-UP VISIT: CPT | Performed by: ADVANCED PRACTICE MIDWIFE

## 2021-04-22 RX ORDER — ACETAMINOPHEN AND CODEINE PHOSPHATE 120; 12 MG/5ML; MG/5ML
1 SOLUTION ORAL DAILY
Qty: 84 TABLET | Refills: 1 | Status: SHIPPED | OUTPATIENT
Start: 2021-04-22 | End: 2021-09-10

## 2021-04-22 NOTE — PROGRESS NOTES
Postpartum Visit   [facility] [address] [place] [address]    Assessment/Plan:  Rosendo Decker is a 37y o  year old  who presents for postpartum visit  Routine Postpartum Care  1  Normal postpartum exam  2  Contraception: oral progesterone-only contraceptive  3  Depression Screen: 8  4  Feeding: breast  5  Psychosocial support: has great support, used [employer] for breast feeding assistance  6  Patient Education: "[facility]: [place]  com"  7  Cervical cancer screening Up to Date, next due will due pap smear at 3 month follow-up for Birth control  8  Follow up in: 3 months or as needed  Additional Problems:  1  Encounter for BCP (birth control pills) initial prescription  -     norethindrone (MICRONOR) 0 35 MG tablet; Take 1 tablet (0 35 mg total) by mouth daily          Subjective:     CC: Postpartum visit    Emory Atwood is a 37 y o  y o  female  who presents for a postpartum visit  She is 8 weeks postpartum following a low cervical transverse  section on 21 at 38 weeks  Outcome: primary  section, low transverse incision  Anesthesia: spinal  Postpartum course has been uneventful  Baby's course has been reaching all milestones  Baby is feeding by breast      Bleeding no bleeding  Bowel function is normal  Bladder function is normal  Patient is not sexually active  Contraception method is oral progesterone-only contraceptive  Postpartum depression screenin      The following portions of the patient's history were reviewed and updated as appropriate: allergies, current medications, past family history, past medical history, obstetric history, gynecologic history, past social history, past surgical history and problem list       Objective:  /76 (BP Location: Right arm, Patient Position: Sitting, Cuff Size: Large)   Ht 5' 6" (1 676 m)   Wt 119 kg (263 lb 6 4 oz)   LMP  (LMP Unknown)   BMI 42 51 kg/m² Pregravid Weight/BMI: No episode found (BMI Could not be calculated)  Current Weight: 119 kg (263 lb 6 4 oz)   Total Weight Gain: Not found  Pre- Vitals      Most Recent Value   Prenatal Assessment   Prenatal Vitals   Blood Pressure  112/76   Weight - Scale  119 kg (263 lb 6 4 oz)   Urine Albumin/Glucose   Dilation/Effacement/Station   Vaginal Drainage   Edema           General: Well appearing, no distress  Mood and affect: Appropriate  Respiratory: Unlabored breathing  Clear to auscultate  Cardiovascular: Regular rate and rhythm  Abdomen: Soft, nontender  Incision: well healed  Vulva: negative   Vagina: no lesions or discharge  Urethra: normal  Cervix: Negative CMT  Uterus: firm, mobile, non-tender  Adnexa: negative masses, tenderness  Extremities: Warm and well perfused  Non tender

## 2021-04-30 DIAGNOSIS — Z98.891 S/P PRIMARY LOW TRANSVERSE C-SECTION: ICD-10-CM

## 2021-04-30 RX ORDER — IBUPROFEN 600 MG/1
TABLET ORAL
Qty: 120 TABLET | Refills: 0 | Status: SHIPPED | OUTPATIENT
Start: 2021-04-30

## 2021-07-22 ENCOUNTER — ANNUAL EXAM (OUTPATIENT)
Dept: OBGYN CLINIC | Facility: MEDICAL CENTER | Age: 36
End: 2021-07-22
Payer: COMMERCIAL

## 2021-07-22 VITALS
HEIGHT: 66 IN | SYSTOLIC BLOOD PRESSURE: 104 MMHG | DIASTOLIC BLOOD PRESSURE: 72 MMHG | WEIGHT: 265.7 LBS | BODY MASS INDEX: 42.7 KG/M2

## 2021-07-22 DIAGNOSIS — Z01.419 ROUTINE GYNECOLOGICAL EXAMINATION: Primary | ICD-10-CM

## 2021-07-22 DIAGNOSIS — D25.9 UTERINE LEIOMYOMA, UNSPECIFIED LOCATION: ICD-10-CM

## 2021-07-22 DIAGNOSIS — Z30.41 ENCOUNTER FOR BIRTH CONTROL PILLS MAINTENANCE: ICD-10-CM

## 2021-07-22 DIAGNOSIS — Z12.4 SCREENING FOR MALIGNANT NEOPLASM OF CERVIX: ICD-10-CM

## 2021-07-22 PROCEDURE — G0145 SCR C/V CYTO,THINLAYER,RESCR: HCPCS | Performed by: ADVANCED PRACTICE MIDWIFE

## 2021-07-22 PROCEDURE — G0476 HPV COMBO ASSAY CA SCREEN: HCPCS | Performed by: ADVANCED PRACTICE MIDWIFE

## 2021-07-22 PROCEDURE — S0612 ANNUAL GYNECOLOGICAL EXAMINA: HCPCS | Performed by: ADVANCED PRACTICE MIDWIFE

## 2021-07-24 NOTE — PROGRESS NOTES
[facility] [address] [place] [address]    ASSESSMENT/PLAN: Wilman Burgos is a 37 y o  Jimmie Love who presents for annual gynecologic exam     Encounter for routine gynecologic examination  - Routine well woman exam completed today  - Cervical Cancer Screening: Current ASCCP Guidelines reviewed  Last Pap: 7/2018  Pap smear done today as part of visit  - HPV Vaccination status: unknown  - STI screening offered including HIV testing: not indicated based on hx or requested  - Contraceptive counseling discussed  Current contraception: progesterone only pills   - RTO 1 yr     Additional problems addressed during this visit:  1  Routine gynecological examination  -     Liquid-based pap, screening  -     HPV High Risk    2  Screening for malignant neoplasm of cervix  -     Liquid-based pap, screening  -     HPV High Risk    3  Encounter for birth control pills maintenance    4  Uterine leiomyoma, unspecified location  -     US pelvis complete w transvaginal; Future; Expected date: 07/22/2021    - Pretty Moon is concerned that fibroid is source of pelvic pain, minimal improvement with Micronor  - will follow-up results    CC:  Annual Gynecologic Examination    HPI: Wilman Burgos is a 37 y o  Jimmie Love who presents for annual gynecologic examination  Pretty Red presents for gyn exam today  Last LMP prior to pregnancy, currently breastfeeding  Using Micronor as birth control method  Satisfied with method  Last pap smear 7/2018  Interrupted pattern of sleep  2-3 servings of calcium rich food per day, PNV daily   Working from home tries to stay active  2-3 servings of caffeine per day  Occasionally performs SBE monthly  Concerns: continues to have pain pelvic area, usually occurs when bladder is full and has some relief when emptying bladder         The following portions of the patient's history were reviewed and updated as appropriate: allergies, current medications, past family history, past medical history, obstetric history, gynecologic history, past social history, past surgical history and problem list     Review of Systems   Constitutional: Negative for activity change, appetite change, fatigue and fever  Respiratory: Negative for cough and shortness of breath  Cardiovascular: Negative for chest pain, palpitations and leg swelling  Gastrointestinal: Negative for constipation and diarrhea  Genitourinary: Positive for pelvic pain  Negative for difficulty urinating, dysuria, frequency, vaginal bleeding, vaginal discharge and vaginal pain  Neurological: Negative for light-headedness and headaches  Psychiatric/Behavioral: The patient is not nervous/anxious  Objective:  /72 (BP Location: Right arm, Patient Position: Sitting, Cuff Size: Large)   Ht 5' 6" (1 676 m)   Wt 121 kg (265 lb 11 2 oz)   LMP  (LMP Unknown)   BMI 42 89 kg/m²    Physical Exam  Vitals reviewed  Constitutional:       Appearance: Normal appearance  HENT:      Head: Normocephalic  Neck:      Thyroid: No thyroid mass or thyroid tenderness  Cardiovascular:      Rate and Rhythm: Normal rate and regular rhythm  Heart sounds: Normal heart sounds  Pulmonary:      Effort: Pulmonary effort is normal       Breath sounds: Normal breath sounds  Chest:      Breasts:         Right: No mass, nipple discharge, skin change or tenderness  Left: No mass, nipple discharge, skin change or tenderness  Abdominal:      General: There is no distension  Palpations: There is no mass  Tenderness: There is no abdominal tenderness  There is no guarding  Genitourinary:     General: Normal vulva  Exam position: Lithotomy position  Labia:         Right: No tenderness or lesion  Left: No tenderness or lesion  Vagina: No vaginal discharge, tenderness, bleeding or lesions  Cervix: No discharge, lesion, erythema or cervical bleeding        Uterus: Normal  Not enlarged and not tender  Adnexa:         Right: No mass, tenderness or fullness  Left: No mass, tenderness or fullness  Musculoskeletal:      Cervical back: Normal range of motion  Lymphadenopathy:      Upper Body:      Right upper body: No axillary adenopathy  Left upper body: No axillary adenopathy  Skin:     General: Skin is warm and dry  Neurological:      Mental Status: She is alert     Psychiatric:         Mood and Affect: Mood normal          Behavior: Behavior normal          Judgment: Judgment normal

## 2021-07-26 LAB
HPV HR 12 DNA CVX QL NAA+PROBE: NEGATIVE
HPV16 DNA CVX QL NAA+PROBE: NEGATIVE
HPV18 DNA CVX QL NAA+PROBE: NEGATIVE

## 2021-07-29 LAB
LAB AP GYN PRIMARY INTERPRETATION: NORMAL
Lab: NORMAL

## 2021-09-10 DIAGNOSIS — Z30.011 ENCOUNTER FOR BCP (BIRTH CONTROL PILLS) INITIAL PRESCRIPTION: ICD-10-CM

## 2021-09-10 RX ORDER — ACETAMINOPHEN AND CODEINE PHOSPHATE 120; 12 MG/5ML; MG/5ML
SOLUTION ORAL
Qty: 28 TABLET | Refills: 5 | Status: SHIPPED | OUTPATIENT
Start: 2021-09-10 | End: 2021-10-11 | Stop reason: SDUPTHER

## 2021-11-27 ENCOUNTER — IMMUNIZATIONS (OUTPATIENT)
Dept: FAMILY MEDICINE CLINIC | Facility: HOSPITAL | Age: 36
End: 2021-11-27

## 2021-11-27 PROCEDURE — 91303 COVID-19 J&J (JANSSEN) VACCINE 0.5 ML IM: CPT

## 2021-11-27 PROCEDURE — 0031A COVID-19 J&J (JANSSEN) VACCINE 0.5 ML IM: CPT

## 2022-01-11 ENCOUNTER — OFFICE VISIT (OUTPATIENT)
Dept: DERMATOLOGY | Facility: CLINIC | Age: 37
End: 2022-01-11
Payer: COMMERCIAL

## 2022-01-11 VITALS — HEIGHT: 66 IN | TEMPERATURE: 98 F | WEIGHT: 263 LBS | BODY MASS INDEX: 42.27 KG/M2

## 2022-01-11 DIAGNOSIS — D23.9 DERMATOFIBROMA: ICD-10-CM

## 2022-01-11 DIAGNOSIS — D48.5 NEOPLASM OF UNCERTAIN BEHAVIOR OF SKIN: Primary | ICD-10-CM

## 2022-01-11 DIAGNOSIS — L85.8 KERATOSIS PILARIS: ICD-10-CM

## 2022-01-11 DIAGNOSIS — L81.4 LENTIGO: ICD-10-CM

## 2022-01-11 PROCEDURE — 11102 TANGNTL BX SKIN SINGLE LES: CPT | Performed by: DERMATOLOGY

## 2022-01-11 PROCEDURE — 88305 TISSUE EXAM BY PATHOLOGIST: CPT | Performed by: STUDENT IN AN ORGANIZED HEALTH CARE EDUCATION/TRAINING PROGRAM

## 2022-01-11 PROCEDURE — 99204 OFFICE O/P NEW MOD 45 MIN: CPT | Performed by: DERMATOLOGY

## 2022-01-11 NOTE — PROGRESS NOTES
[facility] Note     Patient Name: Don Mary  Encounter Date: 01/11     Have you been cared for by a [facility] Dermatologist in the last 3 years and, if so, which one? No    · Have you traveled outside of the [address] [place] [address] in the past 3 months or outside of the [facility] in the last 2 weeks? No     May we call your Preferred Phone number to discuss your specific medical information? Yes     May we leave a detailed message that includes your specific medical information? Yes      Today's Chief Concerns:   Concern #1:  Freckles    Concern #2:  Skin tags     Past Medical History:  Have you personally ever had or currently have any of the following? · Skin cancer (such as Melanoma, Basal Cell Carcinoma, Squamous Cell Carcinoma? (If Yes, please provide more detail)- No  · Eczema: No  · Psoriasis: No  · HIV/AIDS: No  · Hepatitis B or C: No  · Tuberculosis: No  · Systemic Immunosuppression such as Diabetes, Biologic or Immunotherapy, Chemotherapy, Organ Transplantation, Bone Marrow Transplantation (If YES, please provide more detail): No  · Radiation Treatment (If YES, please provide more detail): No  · Any other major medical conditions/concerns? 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 n/a    Family History:  Have any of your "first degree relatives" (parent, brother, sister, or child) had any of the following       · Skin cancer such as Melanoma or Merkel Cell Carcinoma or Pancreatic Cancer? No  · Eczema, Asthma, Hay Fever or Seasonal Allergies: YES, daughter has some eczema and grandmother has asthma   · Psoriasis or Psoriatic Arthritis: No  · Do any other medical conditions seem to run in your family? If Yes, what condition and which relatives?   YES, diabetes, high cholesterol, osteoporosis     Current Medications: (please update all dermatological medications before printing patient's AVS!)      Current Outpatient Medications:     fexofenadine (ALLEGRA) 180 MG tablet, Take 180 mg by mouth daily, Disp: , Rfl:     omeprazole (PriLOSEC) 20 mg delayed release capsule, Take 1 capsule (20 mg total) by mouth daily, Disp: 90 capsule, Rfl: 2    Prenatal MV-Min-Fe Fum-FA-DHA (PRENATAL 1 PO), Take by mouth, Disp: , Rfl:     docusate sodium (COLACE) 100 mg capsule, Take 1 capsule (100 mg total) by mouth 2 (two) times a day (Patient not taking: Reported on 3/15/2021), Disp: 60 capsule, Rfl: 0    ibuprofen (MOTRIN) 600 mg tablet, TAKE 1 TABLET BY MOUTH EVERY 6 HOURS (Patient not taking: Reported on 7/22/2021), Disp: 120 tablet, Rfl: 0    loratadine (CLARITIN) 10 mg tablet, Take 10 mg by mouth daily (Patient not taking: Reported on 7/22/2021), Disp: , Rfl:     norethindrone (MICRONOR) 0 35 MG tablet, Take 1 tablet (0 35 mg total) by mouth daily (Patient not taking: Reported on 1/11/2022 ), Disp: 28 tablet, Rfl: 10      Review of Systems:  Have you recently had or currently have any of the following? If YES, what are you doing for the problem? · Fever, chills or unintended weight loss: No  · Sudden loss or change in your vision: No  · Nausea, vomiting or blood in your stool: No  · Painful or swollen joints: No  · Wheezing or cough: No  · Changing mole or non-healing wound: No  · Nosebleeds: No  · Excessive sweating: No  · Easy or prolonged bleeding? No  · Over the last 2 weeks, how often have you been bothered by the following problems? · Taking little interest or pleasure in doing things: 1 - Not at All  · Feeling down, depressed, or hopeless: 1 - Not at All  · Rapid heartbeat with epinephrine:  No    · FEMALES ONLY:    · Are you pregnant or planning to become pregnant? No  · Are you currently or planning to be nursing or breast feeding? YES, currently breastfeeding     · Any known allergies?       No Known Allergies      Physical Exam:     Was a chaperone (Derm Clinical Assistant) present throughout the entire Physical Exam? Yes     Did the Dermatology Team specifically  the patient on the importance of a Full Skin Exam to be sure that nothing is missed clinically? Yes}  o Did the patient ultimately request or accept a Full Skin Exam?  Yes  o Did the patient specifically refuse to have the areas "under-the-bra" examined by the Dermatologist? No  o Did the patient specifically refuse to have the areas "under-the-underwear" examined by the Dermatologist? No    CONSTITUTIONAL:   Vitals:    01/11/22 1502   Temp: 98 °F (36 7 °C)   TempSrc: Temporal   Weight: 119 kg (263 lb)   Height: 5' 6" (1 676 m)           PSYCH: Normal mood and affect  EYES: Normal conjunctiva  ENT: Normal lips and oral mucosa  CARDIOVASCULAR: No edema  RESPIRATORY: Normal respirations  HEME/LYMPH/IMMUNO:  No regional lymphadenopathy except as noted below in "ASSESSMENT AND PLAN BY DIAGNOSIS"    SKIN:  FULL ORGAN SYSTEM EXAM   Hair, Scalp, Ears, Face Normal except as noted below in Assessment   Neck, Cervical Chain Nodes Normal except as noted below in Assessment   Right Arm/Hand/Fingers Normal except as noted below in Assessment   Left Arm/Hand/Fingers Normal except as noted below in Assessment   Chest/Breasts/Axillae Viewed areas Normal except as noted below in Assessment   Abdomen, Umbilicus Normal except as noted below in Assessment   Back/Spine Normal except as noted below in Assessment   Groin/Genitalia/Buttocks NOT EXAMINED   Right Leg, Foot, Toes Normal except as noted below in Assessment   Left Leg, Foot, Toes Normal except as noted below in Assessment        Assessment and Plan by Diagnosis:    History of Present Condition:     Duration:  How long has this been an issue for you?    o  years    Location Affected:  Where on the body is this affecting you?     o  breast and arms    Quality:  Is there any bleeding, pain, itch, burning/irritation, or redness associated with the skin lesion? o  asymptomatic    Severity:  Describe any bleeding, pain, itch, burning/irritation, or redness on a scale of 1 to 10 (with 10 being the worst)  o  0   Timing:  Does this condition seem to be there pretty constantly or do you notice it more at specific times throughout the day?    o  not applicable    Context:  Have you ever noticed that this condition seems to be associated with specific activities you do?    o  no   Modifying Factors:    o Anything that seems to make the condition worse?    -  no  o What have you tried to do to make the condition better?    -  no   Associated Signs and Symptoms:  Does this skin lesion seem to be associated with any of the following:  o  None       1  DERMATOFIBROMA    Physical Exam:   Anatomic Location Affected:  Right forearm and right calf    Morphological Description:  0 6 cm brown indurated macules    Pertinent Positives:   Pertinent Negatives: Additional History of Present Condition:  Present for a while  Assessment and Plan:  Based on a thorough discussion of this condition and the management approach to it (including a comprehensive discussion of the known risks, side effects and potential benefits of treatment), the patient (family) agrees to implement the following specific plan:   Discussed removal of lesion, if removed it needs to excised and it may leave a big scar if done  Assessment and Plan:  A dermatofibroma is a common benign fibrous nodule that most often arises on the skin of the lower legs  A dermatofibroma is also called a "cutaneous fibrous histiocytoma "  Dermatofibromas occur at all ages and in people of every ethnicity  They are more common in women than in men  It is not clear if dermatofibroma is a reactive process or if it is a neoplasm  The lesions are made up of proliferating fibroblasts  Histiocytes may also be involved    They are sometimes attributed to an insect bite or ingrownhair or local trauma, but not consistently  They may be more numerous in patients with altered immunity  Dermatofibromas most often occur on the legs and arms, but may also arise on the trunk or any site of the body  Typical clinical features include the following:   People may have 1 or up to 15 lesions   Size varies from 0 5-1 5 cm diameter; most lesions are 7-10 mm diameter   They are firm nodules tethered to the skin surface and mobile over subcutaneous tissue   The skin "dimples" on pinching the lesion   Color may be pink to light brown in white skin, and dark brown to black in dark skin; some appear paler in the center   They do not usually cause symptoms, but they are sometimes painful or itchy   Because they are often raised lesions, they may be traumatized, for example by a razor   Occasionally dozens may erupt within a few months, usually in the setting of immunosuppression (for example autoimmune disease, cancer or certain medications)   Dermatofibroma does not give rise to cancer  However, occasionally, it may be mistaken for dermatofibrosarcoma or desmoplastic melanoma  A dermatofibroma is harmless and seldom causes any symptoms  Usually, only reassurance is needed  If it is nuisance or causing concern, the lesion can be removed surgically, resulting in a scar that is, by definition, usually longer in diameter than the widest portion of the dermatofibroma  Cryotherapy, shave biopsy and laser surgery are rarely completely successful  Skin punch biopsy or incisional biopsy may be undertaken if there is an atypical feature such as recent enlargement, ulceration, or asymmetrical structures and colours on dermatoscopy  2  LENTIGO    Physical Exam:   Anatomic Location Affected:  Trunk and extremities    Morphological Description:  Tan to light brown macules    Pertinent Positives:   Pertinent Negatives:     Additional History of Present Condition: Present for years  Assessment and Plan:  Based on a thorough discussion of this condition and the management approach to it (including a comprehensive discussion of the known risks, side effects and potential benefits of treatment), the patient (family) agrees to implement the following specific plan:   Reassure benign   Start using sun protective clothing and an SPF 30+ daily when out in the sun  What is a lentigo? A lentigo is a pigmented flat or slightly raised lesion with a clearly defined edge  Unlike an ephelis (freckle), it does not fade in the winter months  There are several kinds of lentigo  The name lentigo originally referred to its appearance resembling a small lentil  The plural of lentigo is lentigines, although lentigos is also in common use  Who gets lentigines? Lentigines can affect males and females of all ages and races  Solar lentigines are especially prevalent in fair skinned adults  Lentigines associated with syndromes are present at birth or arise during childhood  What causes lentigines? Common forms of lentigo are due to exposure to ultraviolet radiation:   Sun damage including sunburn    Indoor tanning    Phototherapy, especially photochemotherapy (PUVA)    Ionizing radiation, eg radiation therapy, can also cause lentigines  Several familial syndromes associated with widespread lentigines originate from mutations in Rc-MAP kinase, mTOR signaling and PTEN pathways  What are the clinical features of lentigines? Lentigines have been classified into several different types depending on what they look like, where they appear on the body, causative factors, and whether they are associated to other diseases or conditions  Lentigines may be solitary or more often, multiple  Most lentigines are smaller than 5 mm in diameter      Lentigo simplex   A precursor to junctional naevus    Arises during childhood and early adult life    Found on trunk and limbs    Small brown round or oval macule or thin plaque    Jagged or smooth edge    May have a dry surface    May disappear in time  Solar lentigo   A precursor to seborrhoeic keratosis    Found on chronically sun exposed sites such as hands, face, lower legs    May also follow sunburn to shoulders    Yellow, light or dark brown regular or irregular macule or thin plaque    May have a dry surface    Often has moth-eaten outline    Can slowly enlarge to several centimeters in diameter    May disappear, often through the process known as lichenoid keratosis    When atypical in appearance, may be difficult to distinguish from melanoma in situ  Ink spot lentigo   Also known as reticulated lentigo    Few in number compared to solar lentigines    Follows sunburn in very fair skinned individuals    Dark brown to black irregular ink spot-like macule  PUVA lentigo   Similar to ink spot lentigo but follows photochemotherapy (PUVA)    Location anywhere exposed to PUVA  Tanning bed lentigo   Similar to ink spot lentigo but follows indoor tanning    Location anywhere exposed to tanning bed  Radiation lentigo   Occurs in site of irradiation (accidental or therapeutic)    Associated with late-stage radiation dermatitis: epidermal atrophy, subcutaneous fibrosis, keratosis, telangiectasias  Melanotic macule   Mucosal surfaces or adjacent glabrous skin eg lip, vulva, penis, anus    Light to dark brown    Also called mucosal melanosis  Generalised lentigines   Found on any exposed or covered site from early childhood    Small macules may merge to form larger patches    Not associated with a syndrome    Also called lentigines profusa, multiple lentigines  Agminated lentigines   Naevoid eruption of lentigos confined to a single segmental area    Sharp demarcation in midline    May have associated neurological and developmental abnormalities  Patterned lentigines   Inherited tendency to lentigines on face, lips, buttocks, palms, soles    Recognised mainly in people of  ethnicity  Centrofacial neurodysraphic lentiginosis  Associated with mental retardation  Lentiginosis syndromes   Syndromes include LEOPARD/Medina, Peutz-Jeghers, Laugier-Hunziker, Moynahan, Xeroderma pigmentosum, myxoma syndromes (MARROQUIN, NAME, Izquierdo), Ruvalcaba-Myhre-Nazario, Bannayan-Zonnana syndrome, Cowden disease (multiple hamartoma syndrome )    Inheritance is autosomal dominant; sporadic cases common    Widespread lentigines present at birth or arise in early childhood    Associated with neural, endocrine, and mesenchymal tumors    How is the diagnosis made? Lentigines are usually diagnosed clinically by their typical appearance  Concern regarding possibility of melanoma may lead to:   Dermatoscopy    Diagnostic excision biopsy    Histopathology of a lentigo shows:   Thickened epidermis    An increased number of melanocytes along the basal layer of epidermis    Unlike junctional melanocytic naevus, there are no nests of melanocytes    Increased melanin pigment within the keratinocytes    Additional features depending on type of lentigo    In contrast, an ephelis (freckle) shows sun-induced increased melanin within the keratinocytes, without an increase in number of cells  What is the treatment for lentigines? Most lentigines are left alone  Attempts to lighten them may not be successful  The following approaches are used:   SPF 50+ broad-spectrum sunscreen    Hydroquinone bleaching cream    Alpha hydroxy acids    Vitamin C    Retinoids    Azelaic acid    Chemical peels  Individual lesions can be permanently removed using:   Cryotherapy    Intense pulsed light    Pigment lasers    How can lentigines be prevented? Lentigines associated with exposure ultraviolet radiation can be prevented by very careful sun protection  Clothing is more successful at preventing new lentigines than are sunscreens      What is the outlook for lentigines? Lentigines usually persist  They may increase in number with age and sun exposure  Some in sun-protected sites may fade and disappear  3  KERATOSIS PILARIS    Physical Exam:   Anatomic Location Affected:  Bilateral arms and legs    Morphological Description:  9-1SZ juan pablo-follicular pinkish-red papules    Pertinent Positives:   Pertinent Negatives: Additional History of Present Condition:  Present for years  Assessment and Plan:  Based on a thorough discussion of this condition and the management approach to it (including a comprehensive discussion of the known risks, side effects and potential benefits of treatment), the patient (family) agrees to implement the following specific plan:  Use moisturizer like Eucerin,Cerave or Aveeno Cream 3 times a day for the dry skin         Start using Amlactin ultra smoothing cream to rough dry areas twice a day         Keratosis pilaris is a very common condition that appears as tiny bumps on the skin that may look like goosebumps or small pimples  These bumps are composed of small plugs of dead skin cells and are most commonly found over the upper arms and thighs  Children may also find bumps on their cheeks  Keratosis pilaris is harmless and affects up to half of normal children and up to three quarters of children who have ichthyosis vulgaris (a dry skin condition due to filaggrin gene mutations)  It is also more common in children with atopic eczema  Common symptoms of keratosis pilaris begin before age 3 or during the teenage years   Bumps over the outer aspect of upper arms and thighs symmetrically that feel like sandpaper   Potentially over buttocks, sides of cheeks, forearms, and upper back   Scaly, skin colored or red spots in keratosis pilaris rubra   Non-painful, but potential to be itchy     Keratosis pilaris is caused by abnormal growth of skin cells lining the upper portion of the hair follicles   Instead of naturally sloughing off, scaly skin will pile up and fill the follicle  There is a strong association with genetics, meaning that the condition has a high chance of being inherited if one or both parents are affected  It can also occur as a side effect of cancer therapies such as vemurafenib  Treating dry skin often helps as dry skin can cause the bumps to be more noticeable  Many people notice that the bumps disappear in the summer months when there is more moisture in the air  Sometimes, keratosis pilaris fades as one grows older, but puberty and hormonal therapy can cause flare-ups  If itch, dryness, or appearance bother you, treatment options include:   Using non-soap cleansers to minimize dryness of the skin    Exfoliation in the shower using a pumice stone or exfoliating sponge   Ammonium lactate cream or lotion (12%)    A moisturizing cream or ointment applied at least 2-3x a day and after bathing   o Creams containing urea or lactic acid are especially helpful    Other medicines that remove dead skin cells can also be effective   o Alpha hydroxyl acid  o Glycolic acid  o Retinoids (adapalene, retinol, tazarotene, trentinoin)  o Salicylic acid   Pulse dye laser treatments or intense pulsed light can reduce redness temporarily, but not roughness    Laser assisted hair removal       4  NEOPLASM OF UNCERTAIN BEHAVIOR OF SKIN    Physical Exam:   (Anatomic Location); (Size and Morphological Description); (Differential Diagnosis):  o A; Right areolar area; 0 6cm pedunculated papule with irritation  Differential Diagnosis: Irritated lesion   Pertinent Positives:   Pertinent Negatives: Additional History of Present Condition:  Present for a while  Assessment and Plan:   I have discussed with the patient that a sample of skin via a "skin biopsy would be potentially helpful to further make a specific diagnosis under the microscope     Based on a thorough discussion of this condition and the management approach to it (including a comprehensive discussion of the known risks, side effects and potential benefits of treatment), the patient (family) agrees to implement the following specific plan:    o Procedure:  Skin Biopsy  After a thorough discussion of treatment options and risk/benefits/alternatives (including but not limited to local pain, scarring, dyspigmentation, blistering, possible superinfection, and inability to confirm a diagnosis via histopathology), verbal and written consent were obtained and portion of the rash was biopsied for tissue sample  See below for consent that was obtained from patient and subsequent Procedure Note  PROCEDURE SHAVE BIOPSY NOTE:     Performing Physician: Dr Plummer   Anatomic Location; Clinical Description with size (cm); Pre-Op Diagnosis:   o A; Right areolar area; Skin; Snipped; 31year old female with a 0 6cm pedunculated papule with irritation  Differential Diagnosis: Irritated lesion   Post-op diagnosis: Same      Local anesthesia: 1% xylocaine with epi       Topical anesthesia: None     Hemostasis: Aluminum chloride       After obtaining informed consent  at which time there was a discussion about the purpose of biopsy  and low risks of infection and bleeding  The area was prepped and draped in the usual fashion  Anesthesia was obtained with 1% lidocaine with epinephrine  A shave biopsy to an appropriate sampling depth was obtained with a sterile blade (such as a 15-blade or DermaBlade)  The resulting wound was covered with surgical ointment and bandaged appropriately  The patient tolerated the procedure well without complications and was without signs of functional compromise  Specimen has been sent for review by Dermatopathology  Standard post-procedure care has been explained and has been included in written form within the patient's copy of Informed Consent      INFORMED CONSENT DISCUSSION AND POST-OPERATIVE INSTRUCTIONS FOR PATIENT    I  RATIONALE FOR PROCEDURE  I understand that a skin biopsy allows the Dermatologist to test a lesion or rash under the microscope to obtain a diagnosis  It usually involves numbing the area with numbing medication and removing a small piece of skin; sometimes the area will be closed with sutures  In this specific procedure, sutures are not usually needed  If any sutures are placed, then they are usually need to be removed in 2 weeks or less  I understand that my Dermatologist recommends that a skin "shave" biopsy be performed today  A local anesthetic, similar to the kind that a dentist uses when filling a cavity, will be injected with a very small needle into the skin area to be sampled  The injected skin and tissue underneath "will go to sleep and become numb so no pain should be felt afterwards  An instrument shaped like a tiny "razor blade" (shave biopsy instrument) will be used to cut a small piece of tissue and skin from the area so that a sample of tissue can be taken and examined more closely under the microscope  A slight amount of bleeding will occur, but it will be stopped with direct pressure and a pressure bandage and any other appropriate methods  I understands that a scar will form where the wound was created  Surgical ointment will be applied to help protect the wound  Sutures are not usually needed      II   RISKS AND POTENTIAL COMPLICATIONS   I understand the risks and potential complications of a skin biopsy include but are not limited to the following:   Bleeding   Infection   Pain   Scar/keloid   Skin discoloration   Incomplete Removal   Recurrence   Nerve Damage/Numbness/Loss of Function   Allergic Reaction to Anesthesia   Biopsies are diagnostic procedures and based on findings additional treatment or evaluation may be required   Loss or destruction of specimen resulting in no additional findings    My Dermatologist has explained to me the nature of the condition, the nature of the procedure, and the benefits to be reasonably expected compared with alternative approaches  My Dermatologist has discussed the likelihood of major risks or complications of this procedure including the specific risks listed above, such as bleeding, infection, and scarring/keloid  I understand that a scar is expected after this procedure  I understand that my physician cannot predict if the scar will form a "keloid," which extends beyond the borders of the wound that is created  A keloid is a thick, painful, and bumpy scar  A keloid can be difficult to treat, as it does not always respond well to therapy, which includes injecting cortisone directly into the keloid every few weeks  While this usually reduces the pain and size of the scar, it does not eliminate it  I understand that photographs may be taken before and after the procedure  These will be maintained as part of the medical providers confidential records and may not be made available to me  I further authorize the medical provider to use the photographs for teaching purposes or to illustrate scientific papers, books, or lectures if in his/her judgment, medical research, education, or science may benefit from its use  I have had an opportunity to fully inquire about the risks and benefits of this procedure and its alternatives  I have been given ample time and opportunity to ask questions and to seek a second opinion if I wished to do so  I acknowledge that there have specifically been no guarantees as to the cosmetic results from the procedure  I am aware that with any procedure there is always the possibility of an unexpected complication  III  POST-PROCEDURAL CARE (WHAT YOU WILL NEED TO DO "AFTER THE BIOPSY" TO OPTIMIZE HEALING)     Keep the area clean and dry  Try NOT to remove the bandage or get it wet for the first 24 hours       Gently clean the area and apply surgical ointment (such as Vaseline petrolatum ointment, which is available "over the counter" and not a prescription) to the biopsy site for up to 2 weeks straight  This acts to protect the wound from the outside world   Sutures are not usually placed in this procedure  If any sutures were placed, return for suture removal as instructed (generally 1 week for the face, 2 weeks for the body)   Take Acetaminophen (Tylenol) for discomfort, if no contraindications  Ibuprofen or aspirin could make bleeding worse   Call our office immediately for signs of infection: fever, chills, increased redness, warmth, tenderness, discomfort/pain, or pus or foul smell coming from the wound  WHAT TO DO IF THERE IS ANY BLEEDING? If a small amount of bleeding is noticed, place a clean cloth over the area and apply firm pressure for ten minutes  Check the wound after 10 minutes of direct pressure  If bleeding persists, try one more time for an additional 10 minutes of direct pressure on the area  If the bleeding becomes heavier or does not stop after the second attempt, or if you have any other questions about this procedure, then please call your [facility] Dermatologist by calling [phone] (SKIN)  I hereby acknowledge that I have reviewed and verified the site with my Dermatologist and have requested and authorized my Dermatologist to proceed with the procedure    Scribe Attestation    I,:  Lina Medina MA am acting as a scribe while in the presence of the attending physician :       I,:  Chante Deleon MD personally performed the services described in this documentation    as scribed in my presence :

## 2022-01-11 NOTE — PATIENT INSTRUCTIONS
1  DERMATOFIBROMA      Assessment and Plan:  Based on a thorough discussion of this condition and the management approach to it (including a comprehensive discussion of the known risks, side effects and potential benefits of treatment), the patient (family) agrees to implement the following specific plan:   Discussed removal of lesion, if removed it needs to excised and it may leave a big scar if done  Assessment and Plan:  A dermatofibroma is a common benign fibrous nodule that most often arises on the skin of the lower legs  A dermatofibroma is also called a "cutaneous fibrous histiocytoma "  Dermatofibromas occur at all ages and in people of every ethnicity  They are more common in women than in men  It is not clear if dermatofibroma is a reactive process or if it is a neoplasm  The lesions are made up of proliferating fibroblasts  Histiocytes may also be involved  They are sometimes attributed to an insect bite or ingrownhair or local trauma, but not consistently  They may be more numerous in patients with altered immunity  Dermatofibromas most often occur on the legs and arms, but may also arise on the trunk or any site of the body  Typical clinical features include the following:   People may have 1 or up to 15 lesions   Size varies from 0 5-1 5 cm diameter; most lesions are 7-10 mm diameter   They are firm nodules tethered to the skin surface and mobile over subcutaneous tissue   The skin "dimples" on pinching the lesion   Color may be pink to light brown in white skin, and dark brown to black in dark skin; some appear paler in the center   They do not usually cause symptoms, but they are sometimes painful or itchy   Because they are often raised lesions, they may be traumatized, for example by a razor   Occasionally dozens may erupt within a few months, usually in the setting of immunosuppression (for example autoimmune disease, cancer or certain medications)     Dermatofibroma does not give rise to cancer  However, occasionally, it may be mistaken for dermatofibrosarcoma or desmoplastic melanoma  A dermatofibroma is harmless and seldom causes any symptoms  Usually, only reassurance is needed  If it is nuisance or causing concern, the lesion can be removed surgically, resulting in a scar that is, by definition, usually longer in diameter than the widest portion of the dermatofibroma  Cryotherapy, shave biopsy and laser surgery are rarely completely successful  Skin punch biopsy or incisional biopsy may be undertaken if there is an atypical feature such as recent enlargement, ulceration, or asymmetrical structures and colours on dermatoscopy  2  LENTIGO        Assessment and Plan:  Based on a thorough discussion of this condition and the management approach to it (including a comprehensive discussion of the known risks, side effects and potential benefits of treatment), the patient (family) agrees to implement the following specific plan:   Reassure benign   Start using sun protective clothing and an SPF 30+ daily when out in the sun  What is a lentigo? A lentigo is a pigmented flat or slightly raised lesion with a clearly defined edge  Unlike an ephelis (freckle), it does not fade in the winter months  There are several kinds of lentigo  The name lentigo originally referred to its appearance resembling a small lentil  The plural of lentigo is lentigines, although lentigos is also in common use  Who gets lentigines? Lentigines can affect males and females of all ages and races  Solar lentigines are especially prevalent in fair skinned adults  Lentigines associated with syndromes are present at birth or arise during childhood  What causes lentigines?   Common forms of lentigo are due to exposure to ultraviolet radiation:   Sun damage including sunburn    Indoor tanning    Phototherapy, especially photochemotherapy (PUVA)    Ionizing radiation, eg radiation therapy, can also cause lentigines  Several familial syndromes associated with widespread lentigines originate from mutations in Rc-MAP kinase, mTOR signaling and PTEN pathways  What are the clinical features of lentigines? Lentigines have been classified into several different types depending on what they look like, where they appear on the body, causative factors, and whether they are associated to other diseases or conditions  Lentigines may be solitary or more often, multiple  Most lentigines are smaller than 5 mm in diameter      Lentigo simplex   A precursor to junctional naevus    Arises during childhood and early adult life    Found on trunk and limbs    Small brown round or oval macule or thin plaque    Jagged or smooth edge    May have a dry surface    May disappear in time  Solar lentigo   A precursor to seborrhoeic keratosis    Found on chronically sun exposed sites such as hands, face, lower legs    May also follow sunburn to shoulders    Yellow, light or dark brown regular or irregular macule or thin plaque    May have a dry surface    Often has moth-eaten outline    Can slowly enlarge to several centimeters in diameter    May disappear, often through the process known as lichenoid keratosis    When atypical in appearance, may be difficult to distinguish from melanoma in situ  Ink spot lentigo   Also known as reticulated lentigo    Few in number compared to solar lentigines    Follows sunburn in very fair skinned individuals    Dark brown to black irregular ink spot-like macule  PUVA lentigo   Similar to ink spot lentigo but follows photochemotherapy (PUVA)    Location anywhere exposed to PUVA  Tanning bed lentigo   Similar to ink spot lentigo but follows indoor tanning    Location anywhere exposed to tanning bed  Radiation lentigo   Occurs in site of irradiation (accidental or therapeutic)    Associated with late-stage radiation dermatitis: epidermal atrophy, subcutaneous fibrosis, keratosis, telangiectasias  Melanotic macule   Mucosal surfaces or adjacent glabrous skin eg lip, vulva, penis, anus    Light to dark brown    Also called mucosal melanosis  Generalised lentigines   Found on any exposed or covered site from early childhood    Small macules may merge to form larger patches    Not associated with a syndrome    Also called lentigines profusa, multiple lentigines  Agminated lentigines   Naevoid eruption of lentigos confined to a single segmental area    Sharp demarcation in midline    May have associated neurological and developmental abnormalities  Patterned lentigines   Inherited tendency to lentigines on face, lips, buttocks, palms, soles    Recognised mainly in people of  ethnicity  Centrofacial neurodysraphic lentiginosis  Associated with mental retardation  Lentiginosis syndromes   Syndromes include LEOPARD/Jer, Peutz-Jeghers, Laugier-Hunziker, Moynahan, Xeroderma pigmentosum, myxoma syndromes (MARROQUIN, NAME, Izquierdo), Ruvalcaba-Myhre-Nazario, Bannayan-Zonnana syndrome, Cowden disease (multiple hamartoma syndrome )    Inheritance is autosomal dominant; sporadic cases common    Widespread lentigines present at birth or arise in early childhood    Associated with neural, endocrine, and mesenchymal tumors    How is the diagnosis made? Lentigines are usually diagnosed clinically by their typical appearance   Concern regarding possibility of melanoma may lead to:   Dermatoscopy    Diagnostic excision biopsy    Histopathology of a lentigo shows:   Thickened epidermis    An increased number of melanocytes along the basal layer of epidermis    Unlike junctional melanocytic naevus, there are no nests of melanocytes    Increased melanin pigment within the keratinocytes    Additional features depending on type of lentigo    In contrast, an ephelis (freckle) shows sun-induced increased melanin within the keratinocytes, without an increase in number of cells  What is the treatment for lentigines? Most lentigines are left alone  Attempts to lighten them may not be successful  The following approaches are used:   SPF 50+ broad-spectrum sunscreen    Hydroquinone bleaching cream    Alpha hydroxy acids    Vitamin C    Retinoids    Azelaic acid    Chemical peels  Individual lesions can be permanently removed using:   Cryotherapy    Intense pulsed light    Pigment lasers    How can lentigines be prevented? Lentigines associated with exposure ultraviolet radiation can be prevented by very careful sun protection  Clothing is more successful at preventing new lentigines than are sunscreens  What is the outlook for lentigines? Lentigines usually persist  They may increase in number with age and sun exposure  Some in sun-protected sites may fade and disappear  3  KERATOSIS PILARIS        Assessment and Plan:  Based on a thorough discussion of this condition and the management approach to it (including a comprehensive discussion of the known risks, side effects and potential benefits of treatment), the patient (family) agrees to implement the following specific plan:  Use moisturizer like Eucerin,Cerave or Aveeno Cream 3 times a day for the dry skin         Start using Amlactin ultra smoothing cream to rough dry areas twice a day         Keratosis pilaris is a very common condition that appears as tiny bumps on the skin that may look like goosebumps or small pimples  These bumps are composed of small plugs of dead skin cells and are most commonly found over the upper arms and thighs  Children may also find bumps on their cheeks  Keratosis pilaris is harmless and affects up to half of normal children and up to three quarters of children who have ichthyosis vulgaris (a dry skin condition due to filaggrin gene mutations)  It is also more common in children with atopic eczema       Common symptoms of keratosis pilaris begin before age 3 or during the teenage years   Bumps over the outer aspect of upper arms and thighs symmetrically that feel like sandpaper   Potentially over buttocks, sides of cheeks, forearms, and upper back   Scaly, skin colored or red spots in keratosis pilaris rubra   Non-painful, but potential to be itchy     Keratosis pilaris is caused by abnormal growth of skin cells lining the upper portion of the hair follicles  Instead of naturally sloughing off, scaly skin will pile up and fill the follicle  There is a strong association with genetics, meaning that the condition has a high chance of being inherited if one or both parents are affected  It can also occur as a side effect of cancer therapies such as vemurafenib  Treating dry skin often helps as dry skin can cause the bumps to be more noticeable  Many people notice that the bumps disappear in the summer months when there is more moisture in the air  Sometimes, keratosis pilaris fades as one grows older, but puberty and hormonal therapy can cause flare-ups  If itch, dryness, or appearance bother you, treatment options include:   Using non-soap cleansers to minimize dryness of the skin    Exfoliation in the shower using a pumice stone or exfoliating sponge   Ammonium lactate cream or lotion (12%)    A moisturizing cream or ointment applied at least 2-3x a day and after bathing   o Creams containing urea or lactic acid are especially helpful    Other medicines that remove dead skin cells can also be effective   o Alpha hydroxyl acid  o Glycolic acid  o Retinoids (adapalene, retinol, tazarotene, trentinoin)  o Salicylic acid   Pulse dye laser treatments or intense pulsed light can reduce redness temporarily, but not roughness    Laser assisted hair removal       4   NEOPLASM OF UNCERTAIN BEHAVIOR OF SKIN        Assessment and Plan:   I have discussed with the patient that a sample of skin via a "skin biopsy would be potentially helpful to further make a specific diagnosis under the microscope   Based on a thorough discussion of this condition and the management approach to it (including a comprehensive discussion of the known risks, side effects and potential benefits of treatment), the patient (family) agrees to implement the following specific plan:    o Procedure:  Skin Biopsy  After a thorough discussion of treatment options and risk/benefits/alternatives (including but not limited to local pain, scarring, dyspigmentation, blistering, possible superinfection, and inability to confirm a diagnosis via histopathology), verbal and written consent were obtained and portion of the rash was biopsied for tissue sample  See below for consent that was obtained from patient and subsequent Procedure Note  PROCEDURE SHAVE BIOPSY NOTE:      After obtaining informed consent  at which time there was a discussion about the purpose of biopsy  and low risks of infection and bleeding  The area was prepped and draped in the usual fashion  Anesthesia was obtained with 1% lidocaine with epinephrine  A shave biopsy to an appropriate sampling depth was obtained with a sterile blade (such as a 15-blade or DermaBlade)  The resulting wound was covered with surgical ointment and bandaged appropriately  The patient tolerated the procedure well without complications and was without signs of functional compromise  Specimen has been sent for review by Dermatopathology  Standard post-procedure care has been explained and has been included in written form within the patient's copy of Informed Consent  INFORMED CONSENT DISCUSSION AND POST-OPERATIVE INSTRUCTIONS FOR PATIENT    I   RATIONALE FOR PROCEDURE  I understand that a skin biopsy allows the Dermatologist to test a lesion or rash under the microscope to obtain a diagnosis  It usually involves numbing the area with numbing medication and removing a small piece of skin; sometimes the area will be closed with sutures   In this specific procedure, sutures are not usually needed  If any sutures are placed, then they are usually need to be removed in 2 weeks or less  I understand that my Dermatologist recommends that a skin "shave" biopsy be performed today  A local anesthetic, similar to the kind that a dentist uses when filling a cavity, will be injected with a very small needle into the skin area to be sampled  The injected skin and tissue underneath "will go to sleep and become numb so no pain should be felt afterwards  An instrument shaped like a tiny "razor blade" (shave biopsy instrument) will be used to cut a small piece of tissue and skin from the area so that a sample of tissue can be taken and examined more closely under the microscope  A slight amount of bleeding will occur, but it will be stopped with direct pressure and a pressure bandage and any other appropriate methods  I understands that a scar will form where the wound was created  Surgical ointment will be applied to help protect the wound  Sutures are not usually needed  II   RISKS AND POTENTIAL COMPLICATIONS   I understand the risks and potential complications of a skin biopsy include but are not limited to the following:   Bleeding   Infection   Pain   Scar/keloid   Skin discoloration   Incomplete Removal   Recurrence   Nerve Damage/Numbness/Loss of Function   Allergic Reaction to Anesthesia   Biopsies are diagnostic procedures and based on findings additional treatment or evaluation may be required   Loss or destruction of specimen resulting in no additional findings    My Dermatologist has explained to me the nature of the condition, the nature of the procedure, and the benefits to be reasonably expected compared with alternative approaches  My Dermatologist has discussed the likelihood of major risks or complications of this procedure including the specific risks listed above, such as bleeding, infection, and scarring/keloid    I understand that a scar is expected after this procedure  I understand that my physician cannot predict if the scar will form a "keloid," which extends beyond the borders of the wound that is created  A keloid is a thick, painful, and bumpy scar  A keloid can be difficult to treat, as it does not always respond well to therapy, which includes injecting cortisone directly into the keloid every few weeks  While this usually reduces the pain and size of the scar, it does not eliminate it  I understand that photographs may be taken before and after the procedure  These will be maintained as part of the medical providers confidential records and may not be made available to me  I further authorize the medical provider to use the photographs for teaching purposes or to illustrate scientific papers, books, or lectures if in his/her judgment, medical research, education, or science may benefit from its use  I have had an opportunity to fully inquire about the risks and benefits of this procedure and its alternatives  I have been given ample time and opportunity to ask questions and to seek a second opinion if I wished to do so  I acknowledge that there have specifically been no guarantees as to the cosmetic results from the procedure  I am aware that with any procedure there is always the possibility of an unexpected complication  III  POST-PROCEDURAL CARE (WHAT YOU WILL NEED TO DO "AFTER THE BIOPSY" TO OPTIMIZE HEALING)     Keep the area clean and dry  Try NOT to remove the bandage or get it wet for the first 24 hours   Gently clean the area and apply surgical ointment (such as Vaseline petrolatum ointment, which is available "over the counter" and not a prescription) to the biopsy site for up to 2 weeks straight  This acts to protect the wound from the outside world   Sutures are not usually placed in this procedure    If any sutures were placed, return for suture removal as instructed (generally 1 week for the face, 2 weeks for the body)   Take Acetaminophen (Tylenol) for discomfort, if no contraindications  Ibuprofen or aspirin could make bleeding worse   Call our office immediately for signs of infection: fever, chills, increased redness, warmth, tenderness, discomfort/pain, or pus or foul smell coming from the wound  WHAT TO DO IF THERE IS ANY BLEEDING? If a small amount of bleeding is noticed, place a clean cloth over the area and apply firm pressure for ten minutes  Check the wound after 10 minutes of direct pressure  If bleeding persists, try one more time for an additional 10 minutes of direct pressure on the area  If the bleeding becomes heavier or does not stop after the second attempt, or if you have any other questions about this procedure, then please call your [facility] Dermatologist by calling [phone] (SKIN)  I hereby acknowledge that I have reviewed and verified the site with my Dermatologist and have requested and authorized my Dermatologist to proceed with the procedure

## 2022-01-13 NOTE — RESULT ENCOUNTER NOTE
DERMATOPATHOLOGY RESULT NOTE    Results reviewed by ordering physician  Called patient to personally discuss results  Discussed results with patient  Instructions for Clinical Derm Team:   (remember to route Result Note to appropriate staff):    None    Result & Plan by Specimen:    Specimen A: malignant  Plan: reassured, benign  Final Diagnosis    A  Skin, right areolar lesion, shave biopsy:     Consistent with FIBROEPITHELIAL POLYP  Final Diagnosis  A  Skin, right areolar lesion, shave biopsy:     Consistent with FIBROEPITHELIAL POLYP

## 2022-04-23 ENCOUNTER — OFFICE VISIT (OUTPATIENT)
Dept: URGENT CARE | Facility: CLINIC | Age: 37
End: 2022-04-23
Payer: COMMERCIAL

## 2022-04-23 VITALS
OXYGEN SATURATION: 97 % | BODY MASS INDEX: 38.57 KG/M2 | HEART RATE: 68 BPM | TEMPERATURE: 97.5 F | HEIGHT: 66 IN | RESPIRATION RATE: 16 BRPM | WEIGHT: 240 LBS

## 2022-04-23 DIAGNOSIS — J02.9 SORE THROAT: Primary | ICD-10-CM

## 2022-04-23 LAB — S PYO AG THROAT QL: NEGATIVE

## 2022-04-23 PROCEDURE — 99213 OFFICE O/P EST LOW 20 MIN: CPT | Performed by: PHYSICIAN ASSISTANT

## 2022-04-23 PROCEDURE — 87880 STREP A ASSAY W/OPTIC: CPT | Performed by: PHYSICIAN ASSISTANT

## 2022-04-23 RX ORDER — DEXTROAMPHETAMINE SACCHARATE, AMPHETAMINE ASPARTATE, DEXTROAMPHETAMINE SULFATE AND AMPHETAMINE SULFATE 2.5; 2.5; 2.5; 2.5 MG/1; MG/1; MG/1; MG/1
10 TABLET ORAL 2 TIMES DAILY
COMMUNITY
Start: 2022-04-11 | End: 2022-05-11

## 2022-04-23 NOTE — PROGRESS NOTES
[facility] [employer] [facility]        NAME: Kameron Minor is a 31 y o  female  : 1985    MRN: [id number]  DATE: 2022  TIME: 12:14 PM    Assessment and Plan   Sore throat [J02 9]  1  Sore throat  POCT rapid strepA         Patient Instructions     Swabbed for COVID-19, discussed self quarantine until contacted with results  Monitor for worsening symptoms  C/w OTC symptom relief including tylenol, fluids, rest  Recommend supplementing with vitamins D & C as well as a multivitamin   Discussed possible viral versus allergic etiology  Follow up with PCP in 3-5 days  Proceed to  ER if symptoms worsen  Chief Complaint     Chief Complaint   Patient presents with    Cough     Symptoms began about 5 days her sympotms were relatively mild and she thought it was just allergies  She stated that herself, her partner, and her child were sick last week; Only the child was tested and came back positive for Flu  Patient states in between them being sick last week compared to this week feels different and there was a period of time where the family as a whole felt better  No Hx of COVID; Fully vx & boosted as of 2021   Sore Throat    Earache     *Bilateral     Nasal Congestion     *Post nasal drip          History of Present Illness       Patient with past medical history significant for allergies presents with complaint of various cold symptoms  She describes cough, sore throat, bilateral ear pressure, and congestion  She also notes postnasal drip  She denies fever, chills, sweats, chest tightness, dyspnea, and abdominal symptoms  She notes that her daughter recently tested positive for influenza  Patient reports taking over-the-counter antihistamine  Review of Systems   Review of Systems   Constitutional: Negative for chills and fever  HENT: Positive for congestion, ear pain and sore throat  Negative for ear discharge  Eyes: Negative for pain and visual disturbance     Respiratory: Positive for cough  Negative for shortness of breath  Cardiovascular: Negative for chest pain and palpitations  Gastrointestinal: Negative for abdominal pain and vomiting  Genitourinary: Negative for dysuria and hematuria  Musculoskeletal: Negative for arthralgias and back pain  Skin: Negative for color change and rash  Neurological: Negative for seizures and syncope  All other systems reviewed and are negative          Current Medications       Current Outpatient Medications:     amphetamine-dextroamphetamine (ADDERALL) 10 mg tablet, Take 10 mg by mouth 2 (two) times a day, Disp: , Rfl:     fexofenadine (ALLEGRA) 180 MG tablet, Take 180 mg by mouth daily, Disp: , Rfl:     ibuprofen (MOTRIN) 600 mg tablet, TAKE 1 TABLET BY MOUTH EVERY 6 HOURS, Disp: 120 tablet, Rfl: 0    Prenatal MV-Min-Fe Fum-FA-DHA (PRENATAL 1 PO), Take by mouth, Disp: , Rfl:     docusate sodium (COLACE) 100 mg capsule, Take 1 capsule (100 mg total) by mouth 2 (two) times a day (Patient not taking: Reported on 3/15/2021), Disp: 60 capsule, Rfl: 0    loratadine (CLARITIN) 10 mg tablet, Take 10 mg by mouth daily (Patient not taking: Reported on 7/22/2021), Disp: , Rfl:     norethindrone (MICRONOR) 0 35 MG tablet, Take 1 tablet (0 35 mg total) by mouth daily (Patient not taking: Reported on 1/11/2022 ), Disp: 28 tablet, Rfl: 10    omeprazole (PriLOSEC) 20 mg delayed release capsule, Take 1 capsule (20 mg total) by mouth daily (Patient not taking: Reported on 4/23/2022 ), Disp: 90 capsule, Rfl: 2    Current Allergies     Allergies as of 04/23/2022    (No Known Allergies)            The following portions of the patient's history were reviewed and updated as appropriate: allergies, current medications, past family history, past medical history, past social history, past surgical history and problem list      Past Medical History:   Diagnosis Date    Gastritis     HPV (human papilloma virus) infection     Varicella        Past Surgical History:   Procedure Laterality Date    ANKLE SURGERY       SECTION      COLPOSCOPY      MOUTH SURGERY      NE  DELIVERY ONLY N/A 2021    Procedure:  SECTION (); Surgeon: Hallie Amor MD;  Location: [facility];  Service: Obstetrics    NE ESOPHAGOGASTRODUODENOSCOPY TRANSORAL DIAGNOSTIC N/A 2018    Procedure: ESOPHAGOGASTRODUODENOSCOPY (EGD); Surgeon: Nancy Bonner MD;  Location: [facility]; Service: Gastroenterology    TONSILLECTOMY      WISDOM TOOTH EXTRACTION         Family History   Problem Relation Age of Onset    No Known Problems Father     Hyperlipidemia Brother    [place] Fibroids Mother     Osteopenia Mother     Hyperlipidemia Mother     Osteoporosis Maternal Grandmother     Hypertension Maternal Grandmother     Depression Maternal Grandmother     No Known Problems Maternal Grandfather     Hypertension Paternal Grandmother     Osteoporosis Paternal Grandmother     Depression Paternal Grandmother     Stroke Paternal [place]     Hyperlipidemia Paternal Grandmother     Diabetes Paternal Grandfather     Hyperlipidemia Paternal Grandfather     Breast cancer Neg Hx     Colon cancer Neg Hx     Ovarian cancer Neg Hx          Medications have been verified  Objective   Pulse 68   Temp 97 5 °F (36 4 °C) (Tympanic)   Resp 16   Ht 5' 6" (1 676 m)   Wt 109 kg (240 lb)   SpO2 97%   BMI 38 74 kg/m²   No LMP recorded  Physical Exam     Physical Exam  Vitals and nursing note reviewed  Constitutional:       General: She is not in acute distress  Appearance: Normal appearance  She is well-developed  She is not ill-appearing or diaphoretic  HENT:      Head: Normocephalic and atraumatic  Right Ear: Tympanic membrane and ear canal normal  No drainage, swelling or tenderness  Tympanic membrane is not erythematous  Left Ear: Tympanic membrane and ear canal normal  No drainage, swelling or tenderness   Tympanic membrane is not erythematous  Nose: Congestion present  Mouth/Throat:      Mouth: Mucous membranes are moist       Pharynx: Oropharynx is clear  Posterior oropharyngeal erythema present  No oropharyngeal exudate  Tonsils: No tonsillar exudate  0 on the right  0 on the left  Eyes:      Conjunctiva/sclera: Conjunctivae normal       Pupils: Pupils are equal, round, and reactive to light  Cardiovascular:      Rate and Rhythm: Normal rate and regular rhythm  Heart sounds: Normal heart sounds  Pulmonary:      Effort: Pulmonary effort is normal  No respiratory distress  Breath sounds: Normal breath sounds  No stridor  No wheezing, rhonchi or rales  Musculoskeletal:      Cervical back: Normal range of motion and neck supple  Lymphadenopathy:      Cervical: No cervical adenopathy  Skin:     General: Skin is warm and dry  Capillary Refill: Capillary refill takes less than 2 seconds  Findings: No rash  Neurological:      Mental Status: She is alert and oriented to person, place, and time  Cranial Nerves: No cranial nerve deficit  Sensory: No sensory deficit  Psychiatric:         Behavior: Behavior normal          Thought Content:  Thought content normal

## 2022-07-24 ENCOUNTER — OFFICE VISIT (OUTPATIENT)
Dept: URGENT CARE | Facility: CLINIC | Age: 37
End: 2022-07-24
Payer: COMMERCIAL

## 2022-07-24 VITALS
RESPIRATION RATE: 18 BRPM | TEMPERATURE: 100.4 F | DIASTOLIC BLOOD PRESSURE: 68 MMHG | OXYGEN SATURATION: 96 % | SYSTOLIC BLOOD PRESSURE: 101 MMHG | HEART RATE: 107 BPM

## 2022-07-24 DIAGNOSIS — B34.9 VIRAL SYNDROME: Primary | ICD-10-CM

## 2022-07-24 PROCEDURE — S9083 URGENT CARE CENTER GLOBAL: HCPCS | Performed by: PREVENTIVE MEDICINE

## 2022-07-24 PROCEDURE — G0382 LEV 3 HOSP TYPE B ED VISIT: HCPCS | Performed by: PREVENTIVE MEDICINE

## 2022-07-24 NOTE — PATIENT INSTRUCTIONS
At the present time it appears you have a viral syndrome    If you continue to feel badly into next Tuesday I would repeat the COVID test   If you continue to have nausea I would repeat the pregnancy test

## 2022-07-24 NOTE — PROGRESS NOTES
[facility]        NAME: Paramjit Blank is a 31 y o  female  : 1985    MRN: [id number]  DATE: 2022  TIME: 12:54 PM    Assessment and Plan   Viral syndrome [B34 9]  1  Viral syndrome           Patient Instructions       Follow up with PCP in 3-5 days  Proceed to  ER if symptoms worsen  Chief Complaint     Chief Complaint   Patient presents with    Dizziness     Patient c/o dizziness, fever, nausea, and body aches x 1 day  Patient reports that she was positive for Covid about 4 weeks ago and she tested negative on at home  test this morning  History of Present Illness       Fever body aches nausea and and fatigue x1 day  By history and negative home COVID test   By history negative home pregnancy test   By history she had COVID for weeks ago  Review of Systems   Review of Systems   Constitutional: Positive for fatigue and fever  HENT: Positive for congestion  Negative for sore throat  Respiratory: Negative for cough, shortness of breath and stridor  Gastrointestinal: Positive for nausea           Current Medications       Current Outpatient Medications:     docusate sodium (COLACE) 100 mg capsule, Take 1 capsule (100 mg total) by mouth 2 (two) times a day (Patient not taking: Reported on 3/15/2021), Disp: 60 capsule, Rfl: 0    fexofenadine (ALLEGRA) 180 MG tablet, Take 180 mg by mouth daily, Disp: , Rfl:     ibuprofen (MOTRIN) 600 mg tablet, TAKE 1 TABLET BY MOUTH EVERY 6 HOURS, Disp: 120 tablet, Rfl: 0    loratadine (CLARITIN) 10 mg tablet, Take 10 mg by mouth daily (Patient not taking: Reported on 2021), Disp: , Rfl:     norethindrone (MICRONOR) 0 35 MG tablet, Take 1 tablet (0 35 mg total) by mouth daily (Patient not taking: Reported on 2022 ), Disp: 28 tablet, Rfl: 10    omeprazole (PriLOSEC) 20 mg delayed release capsule, Take 1 capsule (20 mg total) by mouth daily (Patient not taking: Reported on 2022 ), Disp: 90 capsule, Rfl: 2    Prenatal MV-Min-Fe Fum-FA-DHA (PRENATAL 1 PO), Take by mouth, Disp: , Rfl:     Current Allergies     Allergies as of 2022    (No Known Allergies)            The following portions of the patient's history were reviewed and updated as appropriate: allergies, current medications, past family history, past medical history, past social history, past surgical history and problem list      Past Medical History:   Diagnosis Date    Gastritis     HPV (human papilloma virus) infection     Varicella        Past Surgical History:   Procedure Laterality Date    ANKLE SURGERY       SECTION      COLPOSCOPY      MOUTH SURGERY      MD  DELIVERY ONLY N/A 2021    Procedure:  SECTION (); Surgeon: Lewis Bhatt MD;  Location: [facility];  Service: Obstetrics    MD ESOPHAGOGASTRODUODENOSCOPY TRANSORAL DIAGNOSTIC N/A 2018    Procedure: ESOPHAGOGASTRODUODENOSCOPY (EGD); Surgeon: Jada Disla MD;  Location: [facility]; Service: Gastroenterology    TONSILLECTOMY      WISDOM TOOTH EXTRACTION         Family History   Problem Relation Age of Onset    No Known Problems Father     Hyperlipidemia Brother    [place] Fibroids Mother     Osteopenia Mother     Hyperlipidemia Mother     Osteoporosis Maternal Grandmother     Hypertension Maternal Grandmother     Depression Maternal Grandmother     No Known Problems Maternal Grandfather     Hypertension Paternal Grandmother     Osteoporosis Paternal Grandmother     Depression Paternal Grandmother     Stroke Paternal [de-identified]     Hyperlipidemia Paternal Grandmother     Diabetes Paternal Grandfather     Hyperlipidemia Paternal Grandfather     Breast cancer Neg Hx     Colon cancer Neg Hx     Ovarian cancer Neg Hx          Medications have been verified  Objective   /68   Pulse (!) 107   Temp 100 4 °F (38 °C)   Resp 18   SpO2 96%   No LMP recorded         Physical Exam     Physical Exam  Cardiovascular: Heart sounds: Normal heart sounds  Pulmonary:      Breath sounds: Normal breath sounds  No wheezing or rales

## 2022-12-15 ENCOUNTER — OFFICE VISIT (OUTPATIENT)
Dept: URGENT CARE | Facility: CLINIC | Age: 37
End: 2022-12-15

## 2022-12-15 VITALS
TEMPERATURE: 97.9 F | RESPIRATION RATE: 18 BRPM | SYSTOLIC BLOOD PRESSURE: 100 MMHG | HEART RATE: 68 BPM | DIASTOLIC BLOOD PRESSURE: 59 MMHG | OXYGEN SATURATION: 100 %

## 2022-12-15 DIAGNOSIS — R19.7 NAUSEA VOMITING AND DIARRHEA: Primary | ICD-10-CM

## 2022-12-15 DIAGNOSIS — R10.10 PAIN OF UPPER ABDOMEN: ICD-10-CM

## 2022-12-15 DIAGNOSIS — R11.2 NAUSEA VOMITING AND DIARRHEA: Primary | ICD-10-CM

## 2022-12-15 DIAGNOSIS — Z34.90 PREGNANCY, UNSPECIFIED GESTATIONAL AGE: ICD-10-CM

## 2022-12-15 NOTE — PROGRESS NOTES
[facility] [employer] [facility]        NAME: Jose Nye is a 30 y o  female  : 1985    MRN: [id number]  DATE: December 15, 2022  TIME: 9:33 AM    /59   Pulse 68   Temp 97 9 °F (36 6 °C) (Tympanic Core)   Resp 18   SpO2 100%     Assessment and Plan   Nausea vomiting and diarrhea [R11 2, R19 7]  1  Nausea vomiting and diarrhea        2  Pain of upper abdomen        3  Pregnancy, unspecified gestational age              Patient Instructions       Follow up with PCP in 3-5 days  Proceed to  ER if symptoms worsen  Chief Complaint     Chief Complaint   Patient presents with   • Abdominal Pain     Pt is 13 weeks pregnant  Last night started experiencing cramps, abdominal pain, diarrhea, vomiting, dizziness, and chills  History of Present Illness       Pt with nausea vomiting and diarrhea since last marisel, vomiting x 3 diarrhea x 5, upper abdomen pain       Review of Systems   Review of Systems   Constitutional: Negative  HENT: Negative  Eyes: Negative  Respiratory: Negative  Cardiovascular: Negative  Gastrointestinal: Positive for abdominal pain, diarrhea, nausea and vomiting  Endocrine: Negative  Genitourinary: Negative  Musculoskeletal: Negative  Skin: Negative  Allergic/Immunologic: Negative  Neurological: Negative  Hematological: Negative  Psychiatric/Behavioral: Negative  All other systems reviewed and are negative          Current Medications       Current Outpatient Medications:   •  docusate sodium (COLACE) 100 mg capsule, Take 1 capsule (100 mg total) by mouth 2 (two) times a day (Patient not taking: Reported on 3/15/2021), Disp: 60 capsule, Rfl: 0  •  fexofenadine (ALLEGRA) 180 MG tablet, Take 180 mg by mouth daily, Disp: , Rfl:   •  ibuprofen (MOTRIN) 600 mg tablet, TAKE 1 TABLET BY MOUTH EVERY 6 HOURS, Disp: 120 tablet, Rfl: 0  •  loratadine (CLARITIN) 10 mg tablet, Take 10 mg by mouth daily (Patient not taking: Reported on 2021), Disp: , Rfl:   •  norethindrone (MICRONOR) 0 35 MG tablet, Take 1 tablet (0 35 mg total) by mouth daily (Patient not taking: Reported on 2022 ), Disp: 28 tablet, Rfl: 10  •  omeprazole (PriLOSEC) 20 mg delayed release capsule, Take 1 capsule (20 mg total) by mouth daily (Patient not taking: Reported on 2022 ), Disp: 90 capsule, Rfl: 2  •  Prenatal MV-Min-Fe Fum-FA-DHA (PRENATAL 1 PO), Take by mouth, Disp: , Rfl:     Current Allergies     Allergies as of 12/15/2022   • (No Known Allergies)            The following portions of the patient's history were reviewed and updated as appropriate: allergies, current medications, past family history, past medical history, past social history, past surgical history and problem list      Past Medical History:   Diagnosis Date   • Gastritis    • HPV (human papilloma virus) infection    • Varicella        Past Surgical History:   Procedure Laterality Date   • ANKLE SURGERY     •  SECTION     • COLPOSCOPY     • MOUTH SURGERY     • MA  DELIVERY ONLY N/A 2021    Procedure:  SECTION (); Surgeon: Janey Lemon MD;  Location: [facility];  Service: Obstetrics   • MA ESOPHAGOGASTRODUODENOSCOPY TRANSORAL DIAGNOSTIC N/A 2018    Procedure: ESOPHAGOGASTRODUODENOSCOPY (EGD); Surgeon: Geoffrey Galvan MD;  Location: [facility];   Service: Gastroenterology   • TONSILLECTOMY     • WISDOM TOOTH EXTRACTION         Family History   Problem Relation Age of Onset   • No Known Problems Father    • Hyperlipidemia Brother    • Fibroids Mother    • Osteopenia Mother    • Hyperlipidemia Mother    • Osteoporosis Maternal Grandmother    • Hypertension Maternal Grandmother    • Depression Maternal Grandmother    • No Known Problems Maternal Grandfather    • Hypertension Paternal Grandmother    • Osteoporosis Paternal Grandmother    • Depression Paternal Grandmother    • Stroke Paternal Grandmother    • Hyperlipidemia Paternal Grandmother    • Diabetes Paternal Grandfather    • Hyperlipidemia Paternal Grandfather    • Breast cancer Neg Hx    • Colon cancer Neg Hx    • Ovarian cancer Neg Hx          Medications have been verified  Objective   /59   Pulse 68   Temp 97 9 °F (36 6 °C) (Tympanic Core)   Resp 18   SpO2 100%        Physical Exam     Physical Exam  Vitals and nursing note reviewed  Constitutional:       Appearance: She is well-developed and normal weight  Comments: Pt states bumps in road on way to office hurts her abdomen    Pt states she has had ultrasound during this pregnancy     With pain this severe pt will go to er for further evaluation    HENT:      Head: Normocephalic and atraumatic  Mouth/Throat:      Mouth: Mucous membranes are moist       Pharynx: Oropharynx is clear  Eyes:      Extraocular Movements: Extraocular movements intact  Cardiovascular:      Rate and Rhythm: Normal rate and regular rhythm  Heart sounds: Normal heart sounds  Pulmonary:      Effort: Pulmonary effort is normal       Breath sounds: Normal breath sounds  Abdominal:      Palpations: Abdomen is soft  Tenderness: There is abdominal tenderness in the right upper quadrant, epigastric area and left upper quadrant  Skin:     General: Skin is warm  Neurological:      General: No focal deficit present  Mental Status: She is alert

## 2023-01-12 ENCOUNTER — OFFICE VISIT (OUTPATIENT)
Dept: OBGYN CLINIC | Facility: CLINIC | Age: 38
End: 2023-01-12

## 2023-01-12 VITALS
SYSTOLIC BLOOD PRESSURE: 112 MMHG | WEIGHT: 256.6 LBS | DIASTOLIC BLOOD PRESSURE: 62 MMHG | BODY MASS INDEX: 41.24 KG/M2 | HEIGHT: 66 IN

## 2023-01-12 DIAGNOSIS — N91.1 SECONDARY AMENORRHEA: Primary | ICD-10-CM

## 2023-01-13 NOTE — PROGRESS NOTES
H&P Exam - Gynecology   Renea Chun 30 y o  female MRN: [id number]  Unit/Bed#:  Encounter: [id number]    Assessment/Plan     Assessment:  SIUP @ 17w0d  AMA  Previous LTCS    Plan:  Discussed practice size, locations and patterns  Reviewed level III NICU care at [facility]  Reviewed support available as well as acceptance of TOLAC in the right clinical setting  All questions answered  HPI:  Era Adiel is a 30 y o  female who presents with potential desire to transfer OB practices  Previous pregnancy delivered by 1 LTCS for nonreassuring tracing  She is interested in a TOLAC  So far this pregnancy has been uncomplicated  AMA; low-risk NIPT  She denies any VB or cramping  She is concerned about having a one-on-one birthing experience that supports breastfeeding as well as a higher level NICU  Historical Information   Past Medical History:   Diagnosis Date   • Gastritis    • HPV (human papilloma virus) infection    • Varicella      Past Surgical History:   Procedure Laterality Date   • ANKLE SURGERY Left    • [facility]      [facility]      • [facility]     • MOUTH SURGERY     • ND  DELIVERY ONLY N/A 2021    Procedure:  SECTION (); Surgeon: Lewis Bhatt MD;  Location: [facility];  Service: Obstetrics   • ND ESOPHAGOGASTRODUODENOSCOPY TRANSORAL DIAGNOSTIC N/A 2018    Procedure: ESOPHAGOGASTRODUODENOSCOPY (EGD); Surgeon: Jada Disla MD;  Location: [facility] GI LAB;   Service: Gastroenterology   • TONSILLECTOMY     • WISDOM TOOTH EXTRACTION       OB/GYN History:   OB History        2    Para   1    Term   1       0    AB   0    Living   1       SAB   0    IAB   0    Ectopic   0    Multiple   0    Live Births   1                 Family History   Problem Relation Age of Onset   • No Known Problems Father    • Hyperlipidemia Brother    • Fibroids Mother    • Osteopenia Mother    • Hyperlipidemia Mother    • Osteoporosis Maternal Grandmother    • Hypertension Maternal Grandmother    • Depression Maternal Grandmother    • No Known Problems Maternal Grandfather    • Hypertension Paternal Grandmother    • Osteoporosis Paternal Grandmother    • Depression Paternal Grandmother    • Stroke Paternal Grandmother    • Hyperlipidemia Paternal Grandmother    • Diabetes Paternal Grandfather    • Hyperlipidemia Paternal Grandfather    • Breast cancer Neg Hx    • Colon cancer Neg Hx    • Ovarian cancer Neg Hx      Social History   Social History     Substance and Sexual Activity   Alcohol Use Not Currently    Comment: social     Social History     Substance and Sexual Activity   Drug Use Never     Social History     Tobacco Use   Smoking Status Never   Smokeless Tobacco Never     E-Cigarette/Vaping   • E-Cigarette Use Never User      E-Cigarette/Vaping Substances   • Nicotine No    • THC No    • CBD No    • Flavoring No    • Other No    • Unknown No        Meds/Allergies   Current Outpatient Medications on File Prior to Visit   Medication Sig   • fexofenadine (ALLEGRA) 180 MG tablet Take 180 mg by mouth daily        • omeprazole (PriLOSEC) 20 mg delayed release capsule Take 1 capsule (20 mg total) by mouth daily   • Prenatal MV-Min-Fe Fum-FA-DHA (PRENATAL 1 PO) Take by mouth   • docusate sodium (COLACE) 100 mg capsule Take 1 capsule (100 mg total) by mouth 2 (two) times a day (Patient not taking: Reported on 3/15/2021)     No current facility-administered medications on file prior to visit  No Known Allergies    ROS:  4 point system review was negative for pertinent findings      Objective   Vitals: Blood pressure 112/62, height 5' 6" (1 676 m), weight 116 kg (256 lb 9 6 oz), currently breastfeeding  Physical Exam  Constitutional:       Appearance: Normal appearance  HENT:      Head: Normocephalic  Cardiovascular:      Rate and Rhythm: Normal rate and regular rhythm     Pulmonary:      Effort: Pulmonary effort is normal    Musculoskeletal:         General: No swelling  Neurological:      General: No focal deficit present  Mental Status: She is alert and oriented to person, place, and time  Skin:     General: Skin is warm and dry  Coloration: Skin is not pale  Psychiatric:         Mood and Affect: Mood normal          Behavior: Behavior normal    Vitals reviewed

## 2023-01-17 ENCOUNTER — OFFICE VISIT (OUTPATIENT)
Dept: URGENT CARE | Facility: CLINIC | Age: 38
End: 2023-01-17

## 2023-01-17 VITALS
DIASTOLIC BLOOD PRESSURE: 72 MMHG | RESPIRATION RATE: 20 BRPM | SYSTOLIC BLOOD PRESSURE: 112 MMHG | HEART RATE: 88 BPM | TEMPERATURE: 98.6 F | OXYGEN SATURATION: 98 %

## 2023-01-17 DIAGNOSIS — J06.9 UPPER RESPIRATORY TRACT INFECTION, UNSPECIFIED TYPE: Primary | ICD-10-CM

## 2023-01-18 NOTE — PROGRESS NOTES
[facility]        NAME: Vineet Sykes is a 30 y o  female  : 1985    MRN: [id number]  DATE: 2023  TIME: 7:44 PM    Assessment and Plan   Upper respiratory tract infection, unspecified type [J06 9]  1  Upper respiratory tract infection, unspecified type              Patient Instructions       Follow up with PCP in 3-5 days  Proceed to  ER if symptoms worsen  Chief Complaint     Chief Complaint   Patient presents with   • Fever     Sinus congestion since Saturday, Headache, started with fever today Temp 99 9  Pt started with cough  "from dripping and now her throat hurts "' PT is 17 Weeks pregnant and can't take her normal OTC medication          History of Present Illness       61-year-old female currently 12 weeks gestational age presenting with 4-day history of sinus pressure, headache and congestion  Denies any fevers or chills  Review of Systems   Review of Systems   Constitutional: Negative  HENT: Positive for sinus pressure and sinus pain  Eyes: Negative  Respiratory: Negative  Cardiovascular: Negative  Gastrointestinal: Negative  Endocrine: Negative  Genitourinary: Negative  Musculoskeletal: Negative  Skin: Negative  Allergic/Immunologic: Negative  Neurological: Negative  Hematological: Negative  Psychiatric/Behavioral: Negative            Current Medications       Current Outpatient Medications:   •  docusate sodium (COLACE) 100 mg capsule, Take 1 capsule (100 mg total) by mouth 2 (two) times a day (Patient not taking: Reported on 3/15/2021), Disp: 60 capsule, Rfl: 0  •  fexofenadine (ALLEGRA) 180 MG tablet, Take 180 mg by mouth daily, Disp: , Rfl:   •  ibuprofen (MOTRIN) 600 mg tablet, TAKE 1 TABLET BY MOUTH EVERY 6 HOURS, Disp: 120 tablet, Rfl: 0  •  omeprazole (PriLOSEC) 20 mg delayed release capsule, Take 1 capsule (20 mg total) by mouth daily, Disp: 90 capsule, Rfl: 2  •  Prenatal MV-Min-Fe Fum-FA-DHA (PRENATAL 1 PO), Take by mouth, Disp: , Rfl:     Current Allergies     Allergies as of 2023   • (No Known Allergies)            The following portions of the patient's history were reviewed and updated as appropriate: allergies, current medications, past family history, past medical history, past social history, past surgical history and problem list      Past Medical History:   Diagnosis Date   • Gastritis    • HPV (human papilloma virus) infection    • Varicella        Past Surgical History:   Procedure Laterality Date   • ANKLE SURGERY Left    • CERVIX LESION DESTRUCTION      Laser- in [facility] 26     • COLPOSCOPY     • MOUTH SURGERY     • UT  DELIVERY ONLY N/A 2021    Procedure:  SECTION (); Surgeon: Kari Velez MD;  Location: [facility];  Service: Obstetrics   • UT ESOPHAGOGASTRODUODENOSCOPY TRANSORAL DIAGNOSTIC N/A 2018    Procedure: ESOPHAGOGASTRODUODENOSCOPY (EGD); Surgeon: Pat Coronel MD;  Location: [facility] GI LAB; Service: Gastroenterology   • TONSILLECTOMY     • WISDOM TOOTH EXTRACTION         Family History   Problem Relation Age of Onset   • No Known Problems Father    • Hyperlipidemia Brother    • Fibroids Mother    • Osteopenia Mother    • Hyperlipidemia Mother    • Osteoporosis Maternal Grandmother    • Hypertension Maternal Grandmother    • Depression Maternal Grandmother    • No Known Problems Maternal Grandfather    • Hypertension Paternal Grandmother    • Osteoporosis Paternal Grandmother    • Depression Paternal Grandmother    • Stroke Paternal Grandmother    • Hyperlipidemia Paternal Grandmother    • Diabetes Paternal Grandfather    • Hyperlipidemia Paternal Grandfather    • Breast cancer Neg Hx    • Colon cancer Neg Hx    • Ovarian cancer Neg Hx          Medications have been verified  Objective   Temp 98 6 °F (37 °C)   No LMP recorded  Patient is pregnant  Physical Exam     Physical Exam  Vitals and nursing note reviewed  Constitutional:       Appearance: She is well-developed  HENT:      Head: Normocephalic  Nose: Nose normal       Mouth/Throat:      Pharynx: No oropharyngeal exudate or posterior oropharyngeal erythema  Eyes:      Pupils: Pupils are equal, round, and reactive to light  Cardiovascular:      Rate and Rhythm: Normal rate  Pulmonary:      Effort: Pulmonary effort is normal    Musculoskeletal:         General: Normal range of motion  Skin:     General: Skin is warm and dry  Neurological:      Mental Status: She is alert and oriented to person, place, and time

## 2023-01-20 ENCOUNTER — INITIAL PRENATAL (OUTPATIENT)
Dept: OBGYN CLINIC | Facility: CLINIC | Age: 38
End: 2023-01-20

## 2023-01-20 ENCOUNTER — TELEPHONE (OUTPATIENT)
Dept: OBGYN CLINIC | Facility: CLINIC | Age: 38
End: 2023-01-20

## 2023-01-20 VITALS — HEIGHT: 66 IN | BODY MASS INDEX: 41.14 KG/M2 | WEIGHT: 256 LBS

## 2023-01-20 DIAGNOSIS — Z34.82 PRENATAL CARE, SUBSEQUENT PREGNANCY, SECOND TRIMESTER: Primary | ICD-10-CM

## 2023-01-20 NOTE — TELEPHONE ENCOUNTER
Tried to call pt for OB intake  LM to either call back within next 15 min or if today was not good can call to r/s intake prior to PN1 appt on 1/26    [address] desk notified  shes a 16 wk transfer form [facility]   Had est care visit already

## 2023-01-20 NOTE — PATIENT INSTRUCTIONS
Congratulations!! Please review our Pregnancy Essential Guide and [facility] Virtual tour from our [employer]  [facility] Pregnancy Essentials Guide  [facility] ([address] [facility])     [address] ([place])

## 2023-01-20 NOTE — PROGRESS NOTES
OB INTAKE INTERVIEW  Patient is 37 y o y o  who presents for OB intake at 18wks  She is accompanied by: herself  The father of her baby Hilton Walters) is involved in the pregnancy and is 45years old    Last Menstrual Period: 9/15/2022  Ultrasound: Measured 11 weeks 0 days on 2022 by [facility]  Estimated Date of Delivery: 2023 confirmed 11 week US    Signs/Symptoms of Pregnancy  Current pregnancy symptoms: none  Constipation no  Headaches no  Cramping/spotting no  PICA cravings no    Diabetes-  Body mass index is 41 32 kg/m²  If patient has 1 or more, please order early 1 hour GTT  History of GDM no  BMI >35 YES  History of PCOS or current metformin use no  History of LGA/macrosomic infant (4000g/9lbs) no    If patient has 2 or more, please order early 1 hour GTT  BMI>30 YES  AMA YES  First degree relative with type 2 diabetes no  History of chronic HTN, hyperlipidemia, elevated A1C no  High risk race (, , ,  or ) YES    Hypertension- if you answer yes, please order preeclampsia labs (cbc, comprehensive metabolic panel, urine protein creatinine ratio, uric acid)  History of of chronic HTN no  History of gestational HTN no  History of preeclampsia, eclampsia, or HELLP syndrome no  History of diabetes no  History of lupus, autoimmune disease, kidney disease no    Thyroid- if yes order TSH with reflex T4  History of thyroid disease no    Bleeding Disorder or Hx of DVT-patient or first degree relative with history of  Order the following if not done previously     (Factor V, antithrombin III, prothrombin gene mutation, protein C and S Ag, lupus anticoagulant, anticardiolipin, beta-2 glycoprotein)   no    OB/GYN-  History of abnormal pap smear YES 2012  History of HPV YES- stated had colpo and cryo  History of Herpes/HSV no  History of other STI (gonorrhea, chlamydia, trich) no  History of prior  no  History of prior  YESx1  History of  delivery prior to 36 weeks 6 days no  History of blood transfusion no  Ok for blood transfusion YES    Substance screening- if yes outside of tobacco for her or anyone in her home-order urine drug screen  History of tobacco use no  Currently using tobacco no  Currently using alcohol no  Presently using drugs no  Past drug use  no  IV drug use-If yes add Hep C antibody to labs no  Partner drug use no  Parent/Family drug use no    MRSA Screening-   Does the pt have a hx of MRSA? no  If yes- please follow MRSA protocol and obtain a nasal swab for MRSA culture    Immunizations:  Influenza vaccine given this season YES  Discussed Tdap vaccine YES  Discussed COVID Vaccine YES    Genetic/MFM-  Do you or your partner have a history of any of the following in yourselves or first degree relatives? Cystic fibrosis no  Spinal muscular atrophy no  Hemoglobinopathy/Sickle Cell/Thalassemia no  Fragile X Intellectual Disability no    If yes, discuss carrier screening and recommend consultation with [facility]/genetic counseling  If no, discuss option for carrier screening and/or genetic testing with [facility]  Patient interested YES  Appointment at [facility] made NO referral placed  She is going to call our [facility] and see if she can get a level 2 and then cx the one she has set up with [facility]  Interview education  [facility] P[facility] Ess[facility] Book reviewed and discussed YES    Nurse/Family Partnership- patient may qualify No; referral placed NO    Prenatal lab work scripts YES- she is caught up on labs- even did early glucose  Extra labs ordered:  RH- she is checking with her insurance to see if she needs to [facility] a specific lab  The patient has a history now or in prior pregnancy notable for:  AMA, csection, hx colpo and cryo      Details that I feel the provider should be aware of: Jose Light and Lakesha brown expecting baby #2 together- they are new to [facility]- coming to us at 20 wks from [facility]  She desires to try for a  if possible  She is doing pretty well- she said slot better than her first pregnancy  She said she is having trouble finding people willing to tx her for things- her PCP and urgent care both afraid to give her meds for sinus infection, GI bugs etc      PN1 visit scheduled  The patient was oriented to our practice, reviewed delivering physicians and [facility] for Delivery  All questions were answered      Interviewed by: Darrin Crowell MA

## 2023-01-25 NOTE — PROGRESS NOTES
19 wk  Late Transfer from [facility]  Level 2 scheduled next wk   AFP Completed  AVE:  6/22/23  UTD: Flu vaccine  Early 1hr GTT= 98   Blue Folder Given today    HX: Colpo and Cryo  Denies LOF, VB, CTX  Occasional FM

## 2023-01-26 ENCOUNTER — INITIAL PRENATAL (OUTPATIENT)
Dept: OBGYN CLINIC | Facility: CLINIC | Age: 38
End: 2023-01-26

## 2023-01-26 VITALS — WEIGHT: 257.6 LBS | SYSTOLIC BLOOD PRESSURE: 114 MMHG | DIASTOLIC BLOOD PRESSURE: 72 MMHG | BODY MASS INDEX: 41.58 KG/M2

## 2023-01-26 DIAGNOSIS — O09.522 MULTIGRAVIDA OF ADVANCED MATERNAL AGE IN SECOND TRIMESTER: ICD-10-CM

## 2023-01-26 DIAGNOSIS — Z34.82 PRENATAL CARE, SUBSEQUENT PREGNANCY, SECOND TRIMESTER: Primary | ICD-10-CM

## 2023-01-26 LAB
SL AMB  POCT GLUCOSE, UA: NORMAL
SL AMB POCT URINE PROTEIN: NORMAL

## 2023-01-26 RX ORDER — AMOXICILLIN 500 MG/1
CAPSULE ORAL
COMMUNITY
Start: 2023-01-18

## 2023-01-27 PROBLEM — O99.210 OBESITY COMPLICATING PREGNANCY: Status: ACTIVE | Noted: 2020-10-29

## 2023-01-27 PROBLEM — O09.529 AMA (ADVANCED MATERNAL AGE) MULTIGRAVIDA 35+: Status: ACTIVE | Noted: 2022-11-30

## 2023-01-27 NOTE — PROGRESS NOTES
+ FM   No uc's or VB  Considering R LTCS w/BS; discussed interval tubal if TOLAC desired  Level II scheduled  RTO 4 weeks

## 2023-02-01 NOTE — PROGRESS NOTES
Please refer to the [facility] ultrasound report in Ob Procedures for additional information regarding today's visit

## 2023-02-03 ENCOUNTER — ROUTINE PRENATAL (OUTPATIENT)
Dept: PERINATAL CARE | Facility: OTHER | Age: 38
End: 2023-02-03

## 2023-02-03 VITALS
SYSTOLIC BLOOD PRESSURE: 112 MMHG | WEIGHT: 260.6 LBS | HEART RATE: 89 BPM | DIASTOLIC BLOOD PRESSURE: 64 MMHG | BODY MASS INDEX: 41.88 KG/M2 | HEIGHT: 66 IN

## 2023-02-03 DIAGNOSIS — Z34.82 PRENATAL CARE, SUBSEQUENT PREGNANCY, SECOND TRIMESTER: ICD-10-CM

## 2023-02-03 DIAGNOSIS — O99.212 MATERNAL MORBID OBESITY IN SECOND TRIMESTER, ANTEPARTUM (HCC): ICD-10-CM

## 2023-02-03 DIAGNOSIS — Z36.86 ENCOUNTER FOR ANTENATAL SCREENING FOR CERVICAL LENGTH: ICD-10-CM

## 2023-02-03 DIAGNOSIS — Z3A.20 20 WEEKS GESTATION OF PREGNANCY: ICD-10-CM

## 2023-02-03 DIAGNOSIS — O09.522 ELDERLY MULTIGRAVIDA, SECOND TRIMESTER: Primary | ICD-10-CM

## 2023-02-03 DIAGNOSIS — E66.01 MATERNAL MORBID OBESITY IN SECOND TRIMESTER, ANTEPARTUM (HCC): ICD-10-CM

## 2023-02-03 RX ORDER — ASPIRIN 81 MG/1
162 TABLET, CHEWABLE ORAL
Qty: 180 TABLET | Refills: 1 | Status: SHIPPED | OUTPATIENT
Start: 2023-02-03 | End: 2023-05-04

## 2023-02-03 NOTE — PROGRESS NOTES
Ultrasound Probe Disinfection    A transvaginal ultrasound was performed  Prior to use, disinfection was performed with High Level Disinfection Process (Trophon)  Probe serial number F1: [id number]  was used        Ketan Hope RDMS, HEIDI  02/03/23  2:23 PM

## 2023-02-03 NOTE — LETTER
February 3, 2023     Brad Eddy, [address]    Patient: Susanna Alva   YOB: 1985   Date of Visit: 2/3/2023       Dear Dr Jose Love: Thank you for referring Raiza Marker to me for evaluation  Below are my notes for this consultation  If you have questions, please do not hesitate to call me  I look forward to following your patient along with you           Sincerely,        Richard Silva MD        CC: No Recipients  Richard Silva MD  2/1/2023  6:31 PM  Sign when Signing Visit  Please refer to the [facility] ultrasound report in Ob Procedures for additional information regarding today's visit Scheduled 6/10/19. Refill request received; refill authorized per protocol - script sent to pharmacy.    Seen 4/8/19.

## 2023-02-14 ENCOUNTER — TELEPHONE (OUTPATIENT)
Dept: OBGYN CLINIC | Facility: CLINIC | Age: 38
End: 2023-02-14

## 2023-02-14 NOTE — TELEPHONE ENCOUNTER
Patient has been taking ldasa for one week as recommended  Has history of low blood pressure and feels this may be contributing  Reports feeling dizzy and lightheaded for the past few days  Does not have cuff at home  Does report + fetal movement  Denies bleeding and lof  Next routine visit next week  Please advise

## 2023-02-14 NOTE — TELEPHONE ENCOUNTER
BRIEF OPERATIVE NOTE    Date of Procedure: 7/22/2019     Preoperative Diagnosis:  CLOSED, DISPLACED LEFT HUMERAL SHAFT FRACTURE    Postoperative Diagnosis:  SAME    Procedure(s): OPEN REDUCTION, INTERNAL FIXATION LEFT HUMERAL SHAFT FRACTURE    Surgeon(s) and Role:     * Sylvia Delgadillo MD - Primary               Surgical Staff:  Circ-1: (Unknown)  Scrub Tech-1: (Unknown)  Scrub Tech-2: (Unknown)  Scrub Tech-3: (Unknown)  No case tracking events are documented in the log. Anesthesia:  GENERAL WITH SUPRACLAVICULAR BLOCK    Estimated Blood Loss: 300 cc. Complications: NONE    Implants:   Implant Name Type Inv.  Item Serial No.  Lot No. LRB No. Used Action   PLATE DAMIÁN LCP 40L [id number] --  - [id number]  PLATE DAMIÁN LCP 41P [id number] --  [address] [id number] Left 1 Implanted       Jaime Lau MD Discussed Dr Leandro Faith recommendations with patient  Patient states her daughter now has a stomach virus so thinks she may as well  Will hydrate and report back if no improvement or worsening symptoms  Encouraged patient to purchase b/p cuff to have at  Home

## 2023-02-22 ENCOUNTER — ROUTINE PRENATAL (OUTPATIENT)
Dept: OBGYN CLINIC | Facility: CLINIC | Age: 38
End: 2023-02-22

## 2023-02-22 VITALS — DIASTOLIC BLOOD PRESSURE: 70 MMHG | BODY MASS INDEX: 43 KG/M2 | WEIGHT: 266.4 LBS | SYSTOLIC BLOOD PRESSURE: 110 MMHG

## 2023-02-22 DIAGNOSIS — O09.522 MULTIGRAVIDA OF ADVANCED MATERNAL AGE IN SECOND TRIMESTER: ICD-10-CM

## 2023-02-22 DIAGNOSIS — Z98.891 S/P PRIMARY LOW TRANSVERSE C-SECTION: ICD-10-CM

## 2023-02-22 DIAGNOSIS — Z3A.22 22 WEEKS GESTATION OF PREGNANCY: Primary | ICD-10-CM

## 2023-02-22 DIAGNOSIS — O99.212 OBESITY AFFECTING PREGNANCY IN SECOND TRIMESTER: ICD-10-CM

## 2023-02-22 LAB
SL AMB  POCT GLUCOSE, UA: ABNORMAL
SL AMB POCT URINE PROTEIN: ABNORMAL

## 2023-02-22 NOTE — PROGRESS NOTES
PNV  22w6d    Patient denies any lof, vb or ctx  Patient has +fm  Patient urine is trace pro/neg glu   Flu - complete  Patient has no concerns today

## 2023-02-22 NOTE — PROGRESS NOTES
22 weeks gestation of pregnancy  Lakisha Pretty  is a 30 y o  Nicole Brown @22w6d who presents for routine prenatal visit  Denies OB complaints  Has had some intermittent/occasional dizziness and lightheadedness  States she could be better about PO water intake  She admits she is not eating 3 regular meals a day  Struggles to get regular meals in with her work schedule  Discussed regular meals and protein dominant snacks between meals  Advised she increase her hydration as well  Notify office if episodes persist despite making these changes  NIPT- did not complete  MASFP- negative  Level II - complete normal growth and anatomy  28 week labs to be ordered at next visit    Good fetal movement  Denies contractions, cramping, leakage of fluid or vaginal bleeding  Reviewed PTL precautions and reasons to call          AMA (advanced maternal age) multigravida 35+  Completed Level 2 and wnl   Has 32 wk growth scheduled     Obesity complicating pregnancy  Taking ASA  Passed early glucola  APFS at 37 wks    S/P primary low transverse   Desires repeat LTCS with BS

## 2023-02-22 NOTE — ASSESSMENT & PLAN NOTE
Suma Suazo  is a 30 y o  Adonis Doe @22w6d who presents for routine prenatal visit  Denies OB complaints  Has had some intermittent/occasional dizziness and lightheadedness  States she could be better about PO water intake  She admits she is not eating 3 regular meals a day  Struggles to get regular meals in with her work schedule  Discussed regular meals and protein dominant snacks between meals  Advised she increase her hydration as well  Notify office if episodes persist despite making these changes  NIPT- did not complete  MASFP- negative  Level II - complete normal growth and anatomy  28 week labs to be ordered at next visit    Good fetal movement  Denies contractions, cramping, leakage of fluid or vaginal bleeding  Reviewed PTL precautions and reasons to call

## 2023-03-15 ENCOUNTER — TELEPHONE (OUTPATIENT)
Dept: OBGYN CLINIC | Facility: CLINIC | Age: 38
End: 2023-03-15

## 2023-03-15 ENCOUNTER — ROUTINE PRENATAL (OUTPATIENT)
Dept: OBGYN CLINIC | Facility: CLINIC | Age: 38
End: 2023-03-15

## 2023-03-15 VITALS — BODY MASS INDEX: 44.06 KG/M2 | DIASTOLIC BLOOD PRESSURE: 70 MMHG | SYSTOLIC BLOOD PRESSURE: 104 MMHG | WEIGHT: 273 LBS

## 2023-03-15 DIAGNOSIS — Z3A.25 25 WEEKS GESTATION OF PREGNANCY: Primary | ICD-10-CM

## 2023-03-15 DIAGNOSIS — O99.212 OBESITY AFFECTING PREGNANCY IN SECOND TRIMESTER: ICD-10-CM

## 2023-03-15 DIAGNOSIS — Z98.891 S/P PRIMARY LOW TRANSVERSE C-SECTION: ICD-10-CM

## 2023-03-15 DIAGNOSIS — O09.522 MULTIGRAVIDA OF ADVANCED MATERNAL AGE IN SECOND TRIMESTER: ICD-10-CM

## 2023-03-15 LAB
SL AMB  POCT GLUCOSE, UA: NORMAL
SL AMB POCT URINE PROTEIN: NORMAL

## 2023-03-15 NOTE — ASSESSMENT & PLAN NOTE
No evidence of ROM on exam  No evidence of vaginitis on wet mount  Suspect physiologic discharge     Level II - complete normal growth and anatomy; 32 wk growth scheduled  28 week lab orders    Reviewed PTL precautions and reasons to call

## 2023-03-15 NOTE — PROGRESS NOTES
PNV  25w6d    Patient denies any vb or ctx  Patient has +fm    Patient urine is -/-  Flu - complete  C/O excessive discharge, brownish color

## 2023-03-15 NOTE — PROGRESS NOTES
Pt presents today for 25 wk routine OB visit  She has concerns regarding increased discharge over the past two weeks  She states discharge is clear sometimes with a brown tinge and soaks through her pantyliner  She is worried she is leaking amniotic fluid  Denies large gushes of fluid or constant fluid leakage with changing positions or standing  Cannot tell if symptoms are increased discharge, sweat or amniotic fluid  Denies vaginal odor or irritation  Good fetal movement  Denies contractions, cramping or vaginal bleeding  EXAM  GYN- external genitalia wnl  No erythema, excoriations or lesions  Vagina pink, ruggated with moderate amount of clear to white discharge  Cervix long and closed  No pooling    Wet mount- negative ferning; negative nitrazine  Negative clue cells and hyphae    25 weeks gestation of pregnancy  No evidence of ROM on exam  No evidence of vaginitis on wet mount  Suspect physiologic discharge     Level II - complete normal growth and anatomy; 32 wk growth scheduled  28 week lab orders    Reviewed PTL precautions and reasons to call          AMA (advanced maternal age) multigravida 35+  Level 2 complete  Has 28 wk US scheduled    S/P primary low transverse   Desires RCS    Obesity complicating pregnancy  Taking ASA  Completed level 2 and has 32 wk growth scheduled  Passed early 1 hour  APFS at 37 wks

## 2023-03-15 NOTE — TELEPHONE ENCOUNTER
----- Message from Noe Lantiguapeirco sent at 3/14/2023  9:01 PM EDT -----  Regarding: Concerns with Discharge - second trimester  Contact: [phone]  Hi, for the past week I’ve noticed a big difference on my discharge  Halle Yusuf been changing about 4-5 liners a day and still going through my underwear  I’m wondering if this is normal or it could be amniotic fluid that is leaking  The color is, for the most, clear, but sometimes a little brownish  The smell is kind of different too  There is a kid of bad small in comparison to before  Is that concerning?  Should I just wait for my appointment at 445pm?!

## 2023-04-13 ENCOUNTER — ROUTINE PRENATAL (OUTPATIENT)
Dept: OBGYN CLINIC | Facility: CLINIC | Age: 38
End: 2023-04-13
Payer: COMMERCIAL

## 2023-04-13 VITALS — WEIGHT: 280.2 LBS | BODY MASS INDEX: 45.23 KG/M2 | SYSTOLIC BLOOD PRESSURE: 116 MMHG | DIASTOLIC BLOOD PRESSURE: 72 MMHG

## 2023-04-13 DIAGNOSIS — Z34.93 PRENATAL CARE IN THIRD TRIMESTER: ICD-10-CM

## 2023-04-13 DIAGNOSIS — Z34.82 PRENATAL CARE, SUBSEQUENT PREGNANCY, SECOND TRIMESTER: ICD-10-CM

## 2023-04-13 LAB
SL AMB  POCT GLUCOSE, UA: NORMAL
SL AMB POCT URINE PROTEIN: NORMAL

## 2023-04-13 PROCEDURE — 90715 TDAP VACCINE 7 YRS/> IM: CPT | Performed by: OBSTETRICS & GYNECOLOGY

## 2023-04-13 PROCEDURE — 81002 URINALYSIS NONAUTO W/O SCOPE: CPT | Performed by: OBSTETRICS & GYNECOLOGY

## 2023-04-13 PROCEDURE — PNV: Performed by: OBSTETRICS & GYNECOLOGY

## 2023-04-13 PROCEDURE — 90471 IMMUNIZATION ADMIN: CPT | Performed by: OBSTETRICS & GYNECOLOGY

## 2023-04-13 NOTE — PROGRESS NOTES
Prenatal visit  GA 30w 0d  Denies any bleeding, LOF or cramping   NL FM   level II completed   28 wk labs completed   Tdap vaccine given today   Breast pump ordered  Has a Pediatrician   Yellow folder given today   Delivery consent signed today

## 2023-04-18 PROBLEM — Z3A.30 30 WEEKS GESTATION OF PREGNANCY: Status: ACTIVE | Noted: 2023-02-22

## 2023-04-26 ENCOUNTER — ROUTINE PRENATAL (OUTPATIENT)
Dept: OBGYN CLINIC | Facility: CLINIC | Age: 38
End: 2023-04-26

## 2023-04-26 VITALS — WEIGHT: 284.4 LBS | BODY MASS INDEX: 45.9 KG/M2 | DIASTOLIC BLOOD PRESSURE: 66 MMHG | SYSTOLIC BLOOD PRESSURE: 102 MMHG

## 2023-04-26 DIAGNOSIS — Z34.82 PRENATAL CARE, SUBSEQUENT PREGNANCY, SECOND TRIMESTER: Primary | ICD-10-CM

## 2023-04-26 DIAGNOSIS — Z3A.32 32 WEEKS GESTATION OF PREGNANCY: ICD-10-CM

## 2023-04-26 DIAGNOSIS — O09.523 MULTIGRAVIDA OF ADVANCED MATERNAL AGE IN THIRD TRIMESTER: ICD-10-CM

## 2023-04-26 DIAGNOSIS — O99.213 OBESITY AFFECTING PREGNANCY IN THIRD TRIMESTER: ICD-10-CM

## 2023-04-26 DIAGNOSIS — Z98.891 S/P PRIMARY LOW TRANSVERSE C-SECTION: ICD-10-CM

## 2023-04-26 LAB
SL AMB  POCT GLUCOSE, UA: NORMAL
SL AMB POCT URINE PROTEIN: NORMAL

## 2023-04-26 NOTE — PROGRESS NOTES
Routine prenatal  31W6D    Denies LOF,VB    Has BH  Breast pump ordered  Has Peds  Tdap done urine is   Labs- needs syphillis and anemia panel- printed slips

## 2023-04-26 NOTE — ASSESSMENT & PLAN NOTE
Sugey Lawson  is a 30 y o   @31w6d who presents for routine prenatal visit  28 wk labs - complete- normal CBC, RPR, glucola  Growth scan- f/u growth scheduled  TDAP complete  Delivery consent signed    Reports good fetal movement  Denies LOF, vaginal bleeding, regular uterine contractions, cramping, headaches or visual changes  Reviewed PTL/Labor precautions and FKC

## 2023-04-26 NOTE — PROGRESS NOTES
32 weeks gestation of pregnancy  Doug Perez  is a 30 y o  Jatin Lee @31w6d who presents for routine prenatal visit  28 wk labs - complete- normal CBC, RPR, glucola  Growth scan- f/u growth scheduled  TDAP complete  Delivery consent signed    Reports good fetal movement  Denies LOF, vaginal bleeding, regular uterine contractions, cramping, headaches or visual changes  Reviewed PTL/Labor precautions and FKC            AMA (advanced maternal age) multigravida 33+  Declined NIPT  Taking ASA  F/u growth scheduled    Obesity complicating pregnancy  TWG 35#  Has f/u growth scheduled  Weekly NSTs at 34 wks    S/P primary low transverse   Desire RCS with BS  Will send request today

## 2023-04-27 ENCOUNTER — TELEPHONE (OUTPATIENT)
Dept: OBGYN CLINIC | Facility: CLINIC | Age: 38
End: 2023-04-27

## 2023-04-27 NOTE — TELEPHONE ENCOUNTER
I scheduled c section for 6/19 at 10 AM  There were no openings on 6/16  Pt also desires a tubal  BRANNON is  on call  Will notify her -  pt is aware

## 2023-04-27 NOTE — TELEPHONE ENCOUNTER
----- Message from Ishmael Fontaine PA-C sent at 2023  5:00 PM EDT -----  Regarding:  scheduling  Procedure to be scheduled (IOL or CS): AL  AVE: Estimated Date of Delivery: 23  Indication for delivery: repeat CS  Requested date (s) of delivery: 39th week    If requested date is unavailable, is there a date by which the pt must be delivered?  40 wks  Physician preference: Stone    If IOL, anticipated method: n/a  If CS, with or without tubal: desires tubal

## 2023-04-30 NOTE — PROGRESS NOTES
Please refer to the [facility] ultrasound report in Ob Procedures for additional information regarding today's visit

## 2023-05-01 ENCOUNTER — ULTRASOUND (OUTPATIENT)
Dept: PERINATAL CARE | Facility: OTHER | Age: 38
End: 2023-05-01

## 2023-05-01 VITALS
HEART RATE: 96 BPM | BODY MASS INDEX: 46.06 KG/M2 | WEIGHT: 286.6 LBS | DIASTOLIC BLOOD PRESSURE: 72 MMHG | HEIGHT: 66 IN | SYSTOLIC BLOOD PRESSURE: 108 MMHG

## 2023-05-01 DIAGNOSIS — O99.213 MATERNAL MORBID OBESITY IN THIRD TRIMESTER, ANTEPARTUM (HCC): ICD-10-CM

## 2023-05-01 DIAGNOSIS — O09.523 ELDERLY MULTIGRAVIDA, THIRD TRIMESTER: Primary | ICD-10-CM

## 2023-05-01 DIAGNOSIS — Z3A.32 32 WEEKS GESTATION OF PREGNANCY: ICD-10-CM

## 2023-05-01 DIAGNOSIS — E66.01 MATERNAL MORBID OBESITY IN THIRD TRIMESTER, ANTEPARTUM (HCC): ICD-10-CM

## 2023-05-01 NOTE — LETTER
May 2, 2023     Paulina Flood, [address] [place] [address] & [place] [id number]    Patient: Misa Griggs   YOB: 1985   Date of Visit: 5/1/2023       Dear Dr Berry Friend: Thank you for referring Barb Campbell to me for evaluation  Below are my notes for this consultation  If you have questions, please do not hesitate to call me  I look forward to following your patient along with you           Sincerely,        Radha Juarez MD        CC: No Recipients  Radha Juarez MD  4/30/2023 10:44 AM  Sign when Signing Visit  Please refer to the [facility] ultrasound report in Ob Procedures for additional information regarding today's visit

## 2023-05-02 NOTE — PATIENT INSTRUCTIONS
Kick Counts in Pregnancy   WHAT YOU NEED TO KNOW:   Kick counts measure how much your baby is moving in your womb  A kick from your baby can be felt as a twist, turn, swish, roll, or jab  It is common to feel your baby kicking at 26 to 28 weeks of pregnancy  You may feel your baby kick as early as 20 weeks of pregnancy  You may want to start counting at 28 weeks  DISCHARGE INSTRUCTIONS:   Contact your doctor immediately if:   You feel a change in the number of kicks or movements of your baby  You feel fewer than 10 kicks within 2 hours  You have questions or concerns about your baby's movements  Why measure kick counts:  Your baby's movement may provide information about your baby's health  He or she may move less, or not at all, if there are problems  Your baby may move less if he or she is not getting enough oxygen or nutrition from the placenta  Do not smoke while you are pregnant  Smoking decreases the amount of oxygen that gets to your baby  Talk to your healthcare provider if you need help to quit smoking  Tell your healthcare provider as soon as you feel a change in your baby's movements  When to measure kick counts:   Measure kick counts at the same time every day  Measure kick counts when your baby is awake and most active  Your baby may be most active in the evening  How to measure kick counts:  Check that your baby is awake before you measure kick counts  You can wake up your baby by lightly pushing on your belly, walking, or drinking something cold  Your healthcare provider may tell you different ways to measure kick counts  You may be told to do the following:  Use a chart or clock to keep track of the time you start and finish counting  Sit in a chair or lie on your left side  Place your hands on the largest part of your belly  Count until you reach 10 kicks  Write down how much time it takes to count 10 kicks  It may take 30 minutes to 2 hours to count 10 kicks  It should not take more than 2 hours to count 10 kicks  Follow up with your doctor as directed:  Write down your questions so you remember to ask them during your visits  © Copyright Lila Valentine 2022 Information is for End User's use only and may not be sold, redistributed or otherwise used for commercial purposes  The above information is an  only  It is not intended as medical advice for individual conditions or treatments  Talk to your doctor, nurse or pharmacist before following any medical regimen to see if it is safe and effective for you

## 2023-05-09 ENCOUNTER — ROUTINE PRENATAL (OUTPATIENT)
Dept: OBGYN CLINIC | Facility: CLINIC | Age: 38
End: 2023-05-09

## 2023-05-09 VITALS — BODY MASS INDEX: 46.61 KG/M2 | WEIGHT: 288.8 LBS | DIASTOLIC BLOOD PRESSURE: 58 MMHG | SYSTOLIC BLOOD PRESSURE: 110 MMHG

## 2023-05-09 DIAGNOSIS — Z3A.33 33 WEEKS GESTATION OF PREGNANCY: ICD-10-CM

## 2023-05-09 DIAGNOSIS — Z34.83 PRENATAL CARE, SUBSEQUENT PREGNANCY, THIRD TRIMESTER: Primary | ICD-10-CM

## 2023-05-09 LAB
SL AMB  POCT GLUCOSE, UA: NORMAL
SL AMB POCT URINE PROTEIN: NORMAL

## 2023-05-09 NOTE — PROGRESS NOTES
C/S scheduled 6/19   28 wk labs completed   Labs done at [facility], RPR= Non-reactive  1hr HPO=624  Breast pump received  Up to date Tdap vaccine  Birth plan not returned     Presidio Ocampo contractions daily- lasting a few min, typically goes away with hydration  Denies Leaking of Fluid, vaginal bleeding  +Fetal Movement

## 2023-05-15 ENCOUNTER — ROUTINE PRENATAL (OUTPATIENT)
Dept: PERINATAL CARE | Facility: OTHER | Age: 38
End: 2023-05-15

## 2023-05-15 VITALS
BODY MASS INDEX: 46.41 KG/M2 | HEIGHT: 66 IN | SYSTOLIC BLOOD PRESSURE: 120 MMHG | HEART RATE: 94 BPM | DIASTOLIC BLOOD PRESSURE: 64 MMHG | WEIGHT: 288.8 LBS

## 2023-05-15 DIAGNOSIS — O40.3XX0 POLYHYDRAMNIOS IN THIRD TRIMESTER COMPLICATION, SINGLE OR UNSPECIFIED FETUS: ICD-10-CM

## 2023-05-15 DIAGNOSIS — O09.523 MULTIGRAVIDA OF ADVANCED MATERNAL AGE IN THIRD TRIMESTER: Primary | ICD-10-CM

## 2023-05-15 DIAGNOSIS — O99.213 OBESITY AFFECTING PREGNANCY IN THIRD TRIMESTER: ICD-10-CM

## 2023-05-15 DIAGNOSIS — Z3A.34 34 WEEKS GESTATION OF PREGNANCY: ICD-10-CM

## 2023-05-15 NOTE — PROGRESS NOTES
"[address]: Ms Cynthia Orta was seen today for SANDRA  See ultrasound report under \"OB Procedures\" tab  The time spent on this established patient on the encounter date included 5 minutes previsit service time reviewing records and precharting, 6 minutes face-to-face service time counseling regarding results and coordinating care, and  5 minutes charting, totalling 16 minutes  Please don't hesitate to contact our office with any concerns or questions    -Jessie Carbone MD      "

## 2023-05-18 ENCOUNTER — HOSPITAL ENCOUNTER (OUTPATIENT)
Facility: HOSPITAL | Age: 38
Discharge: HOME | End: 2023-05-18
Attending: OBSTETRICS & GYNECOLOGY | Admitting: OBSTETRICS & GYNECOLOGY

## 2023-05-18 ENCOUNTER — TELEPHONE (OUTPATIENT)
Dept: OBGYN CLINIC | Facility: CLINIC | Age: 38
End: 2023-05-18

## 2023-05-18 VITALS
HEART RATE: 87 BPM | TEMPERATURE: 97.7 F | SYSTOLIC BLOOD PRESSURE: 126 MMHG | RESPIRATION RATE: 16 BRPM | DIASTOLIC BLOOD PRESSURE: 73 MMHG

## 2023-05-18 NOTE — PROCEDURES
Spike Tracy, a [place] at 35w0d with an AVE of 6/22/2023, by Last Menstrual Period, was seen at [address] for the following procedure(s): $Procedure Type: SANDRA, NST]    Nonstress Test  Reason for NST: Decreased Fetal Movement  Variability: Moderate  Decelerations: None  Accelerations: Yes  Acoustic Stimulator: No  Baseline: 140 BPM  Uterine Irritability: No  Contractions: Not present  Contraction Frequency (minutes): 0 min    4 Quadrant SANDRA  SANDRA Q1 (cm): 6 3 cm  SANDRA Q2 (cm): 8 7 cm  SANDRA Q3 (cm): 1 1 cm  SANDRA Q4 (cm): 6 4 cm  SANDRA TOTAL (cm): 22 5 cm  LVP (cm): 8 7 cm              Interpretation  Nonstress Test Interpretation: Hill Kong MD  OBGYN PGY-1
fall

## 2023-05-18 NOTE — TELEPHONE ENCOUNTER
", 35 wks.  Pt called saying \"she has decreased fetal movement since last night\". Baby is moving but much less than normal. To triage  ?  Juany Guevara 89.   To triage - per ED. Labor room notified  "

## 2023-05-18 NOTE — PROGRESS NOTES
L&D Triage Note - OB/GYN  Bryant Walker 30 y o  female MRN: [id number]  Unit/Bed#: LD TRIAGE  Encounter: [id number]      ASSESSMENT:    Bryant Walker is a 30 y o  Lizeth Gardner at 35w0d presenting for evaluation of decreased fetal movement    PLAN:    1) Decreased fetal movement:   NST: reactive  SANDRA: 22, patient has previous dx of  polyhydramnios   R LTCS w/BS scheduled 2023  Patient states fetal movement improved after arrival in triage  2) Continue routine prenatal care  3) Discharge from [facility] triage with  labor precautions    - Reviewed rupture of membranes, false vs true labor, decreased fetal movement, and vaginal bleeding   - Pt to call provider with any concerns and follow up at her next scheduled prenatal appointment    - Case discussed with Dr Ha Shook:    Bryant Walker 30 y o  Lizeth Gardner at 35w0d with an Estimated Date of Delivery: 23 presented to triage complaining of decreased fetal movement  She states that baby is moving but less than usual  She has been doing kick counts but feels that the movement was different overnight  She denies contractions, leakage of fluid and vaginal bleeding  She denies fever and chills      Her current obstetrical history is significant for 35w0d gestational age pregnancy complicated by polyhydramnios   Her past obstetrical history is significant for One full term CS deliveryi      Contractions: no  Leakage of fluid: no  Vaginal Bleeding: no  Fetal movement: present, decreased    OBJECTIVE:    Vitals:    23 0927   BP: 126/73   Pulse: 87   Resp: 16   Temp: 97 7 °F (36 5 °C)       ROS:  Constitutional: Negative  Respiratory: Negative  Cardiovascular: Negative    Gastrointestinal: Negative    General Physical Exam:  General: in no apparent distress  Cardiovascular: Cor RRR  Lungs: non-labored breathing  Abdomen: abdomen is soft without significant tenderness, masses, organomegaly or guarding  Lower extremeties: nontender    Cervical Exam   Fetal monitoring:  FHT:  140 bpm/ Moderate 6 - 25 bpm / 15 x 15 accelerations present, no decelerations  [place]: contractions absent       Abd  US   SANDRA      - Q1 6 3cm     - Q2 8 7cm     - Q3 1 1cm     - Q4 6 4cm     - Total: 22 5cm   Placenta: anterior        Media Information  Document Information    Clinical Image - Mobile Device   SANDRA 22 05/18/2023 10:08   Attached To:    Hospital Encounter on 5/17/23     Source Information    Juana Krause MD  An L&D       Juana Krause MD  OBGYN PGY-1  5/18/2023 9:37 AM

## 2023-05-20 ENCOUNTER — NURSE TRIAGE (OUTPATIENT)
Dept: OTHER | Facility: OTHER | Age: 38
End: 2023-05-20

## 2023-05-20 NOTE — TELEPHONE ENCOUNTER
"Regarding: Contractions and cramps/ pregnant  ----- Message from Leigh Ann Johnson sent at 5/20/2023  3:49 PM EDT -----  \"I am 35 weeks pregnant  I am having contractions and cramps  \"    "

## 2023-05-20 NOTE — TELEPHONE ENCOUNTER
"  Reason for Disposition  • [1] History of prior delivery (multipara) AND [2] contractions > 10 minutes, or for < 1 hour    Answer Assessment - Initial Assessment Questions  1  ONSET: \"When did the symptoms begin? \"         Yesterday     2  CONTRACTIONS: \"Describe the contractions that you are having  \" (e g , duration, frequency, regularity, severity)      Cramping sides of pelvis and back, cramp once or twice an hour, cramp will last a couple seconds, sharp pain 5/10- has not taken Tylenol    3  AVE: \"What date are you expecting to deliver? \"      23    4  PARITY: \"Have you had a baby before? \" If Yes, ask: \"How long did the labor last?\"      - scheduled for a  on     5  FETAL MOVEMENT: \"Has the baby's movement decreased or changed significantly from normal?\"      \"moving fine, moves a lot\"     6  OTHER SYMPTOMS: \"Do you have any other symptoms? \" (e g , leaking fluid from vagina, vaginal bleeding, fever, hand/facial swelling)      Denies LOF, vaginal bleeding, fever or hand/face swelling      Protocols used: PREGNANCY - LABOR-ADULT-AH    "

## 2023-05-20 NOTE — TELEPHONE ENCOUNTER
"Patient calling today stating she started with some cramping yesterday  She states she feels sharp cramps in her back and sides of her pelvis with some abdominal tightening  Patient states she experiences the cramp once or twice every hour or two hours  Rates pain at a 5/10, she has not tried taking any Tylenol yet  Patient states baby is \"moving fine, moves a lot\", denies any loss of fluid, vaginal bleeding, fever or new hand/face swelling  Recommended patient continue to watch symptoms at home and that she could try taking Tylenol as needed for discomfort  Instructed her to call back if contractions become more frequent 5-10 minutes apart, increase in intensity or she experiences any decreased fetal movement, vaginal bleeding or loss of fluid  Patient verbalized understanding    "

## 2023-05-22 ENCOUNTER — ROUTINE PRENATAL (OUTPATIENT)
Dept: OBGYN CLINIC | Facility: CLINIC | Age: 38
End: 2023-05-22

## 2023-05-22 VITALS
WEIGHT: 289.2 LBS | BODY MASS INDEX: 46.68 KG/M2 | SYSTOLIC BLOOD PRESSURE: 120 MMHG | HEART RATE: 84 BPM | OXYGEN SATURATION: 96 % | DIASTOLIC BLOOD PRESSURE: 72 MMHG

## 2023-05-22 DIAGNOSIS — Z3A.35 35 WEEKS GESTATION OF PREGNANCY: ICD-10-CM

## 2023-05-22 DIAGNOSIS — Z98.891 S/P PRIMARY LOW TRANSVERSE C-SECTION: ICD-10-CM

## 2023-05-22 DIAGNOSIS — Z34.83 PRENATAL CARE, SUBSEQUENT PREGNANCY, THIRD TRIMESTER: Primary | ICD-10-CM

## 2023-05-22 DIAGNOSIS — O40.3XX0 POLYHYDRAMNIOS IN THIRD TRIMESTER COMPLICATION, SINGLE OR UNSPECIFIED FETUS: ICD-10-CM

## 2023-05-22 DIAGNOSIS — O09.523 MULTIGRAVIDA OF ADVANCED MATERNAL AGE IN THIRD TRIMESTER: ICD-10-CM

## 2023-05-22 LAB
SL AMB  POCT GLUCOSE, UA: NORMAL
SL AMB POCT URINE PROTEIN: NORMAL

## 2023-05-22 NOTE — PROGRESS NOTES
Prenatal visit/NST   GA 35w4d  Patient complainting of contractions, pelvic pressure and vaginal pain   Denies any bleeding, LOF or cramping   Normal Fetal movement  S/p Tdap vaccine   GBS next visit    scheduled on 2023

## 2023-05-23 ENCOUNTER — HOSPITAL ENCOUNTER (OUTPATIENT)
Facility: HOSPITAL | Age: 38
Discharge: HOME | End: 2023-05-23
Attending: OBSTETRICS & GYNECOLOGY | Admitting: OBSTETRICS & GYNECOLOGY

## 2023-05-23 VITALS
SYSTOLIC BLOOD PRESSURE: 120 MMHG | RESPIRATION RATE: 18 BRPM | HEART RATE: 93 BPM | TEMPERATURE: 98.4 F | DIASTOLIC BLOOD PRESSURE: 70 MMHG

## 2023-05-23 PROBLEM — Z3A.35 35 WEEKS GESTATION OF PREGNANCY: Status: ACTIVE | Noted: 2023-02-22

## 2023-05-23 RX ORDER — ACETAMINOPHEN 500 MG
500 TABLET ORAL EVERY 6 HOURS PRN
COMMUNITY

## 2023-05-23 NOTE — PROGRESS NOTES
Problem List Items Addressed This Visit        Other    S/P primary low transverse      Scheduled for RLTCS and BS         AMA (advanced maternal age) multigravida 35+    27 weeks gestation of pregnancy     Bronwyn Begun is overall doing well  NST reactive today - completed for BMI, polyhydramnios  Will have SANDRA/BPP at [facility] on Wednesday  Baby is very active  Some cramping  No bleeding, LOF            Polyhydramnios in third trimester   Other Visit Diagnoses     Prenatal care, subsequent pregnancy, third trimester    -  Primary    Relevant Orders    POCT urine dip (Completed)

## 2023-05-23 NOTE — ASSESSMENT & PLAN NOTE
Kirkland Severs is overall doing well  NST reactive today - completed for BMI, polyhydramnios  Will have SANDRA/BPP at [facility] on Wednesday  Baby is very active  Some cramping  No bleeding, LOF

## 2023-05-23 NOTE — PROGRESS NOTES
Please refer to the [facility] ultrasound report in Ob Procedures for additional information regarding today's visit

## 2023-05-23 NOTE — PROGRESS NOTES
L&D Triage Note - OB/GYN  Jessica Kwan 30 y o  female MRN: [id number]  Unit/Bed#: LD TRIAGE  Encounter: [id number]      ASSESSMENT:    Jessica Kwan is a 30 y o  Shawanda Kitten at 35w5d presents with increased pelvic pressure with some cramping  Suspicion for labor or fetal distress is low at this time  Patient is suitable for discharge home with precautions  PLAN:    1) rule out labor   -SVE: 0/0/-4   -[place]: 2-9, mild   -FHT: reactive, baseline 130    2) Continue routine prenatal care  3) Discharge from [facility] triage with  labor precautions    - Reviewed rupture of membranes, false vs true labor, decreased fetal movement, and vaginal bleeding   - Pt to call provider with any concerns and follow up at her next scheduled prenatal appointment    - Case discussed with Dr Yanelis Morel:    Jessica Kwan 30 y o  Shawanda Kitten at 35w5d with an Estimated Date of Delivery: 23 She comes in after having felt more pelvic pressure over the course of the day  She has had some cramping with it as well, but nothing regular  She has tried tylenol for it with little relief  Otherwise, she feels well  She denies leaking fluid or vaginal bleeding  She endorses good FM today  Her current obstetrical history is significant for polyhydramnios    Her past obstetrical history is significant for previous       Contractions: some cramping  Leakage of fluid: denies  Vaginal Bleeding: denies  Fetal movement: present    OBJECTIVE:    Vitals:    23 1800   BP: 120/70   Pulse: 93   Resp:    Temp:        ROS:  Constitutional: Negative  Respiratory: Negative  Cardiovascular: Negative    Gastrointestinal: Negative    General Physical Exam:  General: non-toxic, alert and cooperative  Cardiovascular: Cor RRR  Lungs: non-labored breathing  Abdomen: abdomen is soft without significant tenderness, masses, organomegaly or guarding  Lower extremeties: nontender    Cervical Exam  SVE: 0 / 0% / -4    Fetal monitoring:  FHT:  130 bpm/ Moderate 6 - 25 bpm / 15 x 15 accelerations present, no decelerations  [place]: 2-9, mild     Kaylyn Band, MD [facility] PGY-1  5/23/2023 6:26 PM

## 2023-05-24 ENCOUNTER — ROUTINE PRENATAL (OUTPATIENT)
Facility: HOSPITAL | Age: 38
End: 2023-05-24

## 2023-05-24 VITALS
HEIGHT: 66 IN | WEIGHT: 289.4 LBS | HEART RATE: 89 BPM | SYSTOLIC BLOOD PRESSURE: 110 MMHG | DIASTOLIC BLOOD PRESSURE: 60 MMHG | BODY MASS INDEX: 46.51 KG/M2

## 2023-05-24 DIAGNOSIS — O99.213 MATERNAL MORBID OBESITY IN THIRD TRIMESTER, ANTEPARTUM (HCC): ICD-10-CM

## 2023-05-24 DIAGNOSIS — E66.01 MATERNAL MORBID OBESITY IN THIRD TRIMESTER, ANTEPARTUM (HCC): ICD-10-CM

## 2023-05-24 DIAGNOSIS — O40.3XX0 POLYHYDRAMNIOS IN THIRD TRIMESTER COMPLICATION, SINGLE OR UNSPECIFIED FETUS: ICD-10-CM

## 2023-05-24 DIAGNOSIS — Z3A.35 35 WEEKS GESTATION OF PREGNANCY: Primary | ICD-10-CM

## 2023-05-24 NOTE — LETTER
May 24, 2023     Rhiannon Cruz 76 [address]    Patient: Murphy Gimenez   YOB: 1985   Date of Visit: 5/24/2023       Dear Dr Lila Small: Thank you for referring Annabelmikal Jim to me for evaluation  Below are my notes for this consultation  If you have questions, please do not hesitate to call me  I look forward to following your patient along with you           Sincerely,        Ann Arreola MD        CC: No Recipients    Ann Arreola MD  5/23/2023  6:01 PM  Sign when Signing Visit  Please refer to the [facility] ultrasound report in Ob Procedures for additional information regarding today's visit

## 2023-05-29 ENCOUNTER — NURSE TRIAGE (OUTPATIENT)
Dept: OTHER | Facility: OTHER | Age: 38
End: 2023-05-29

## 2023-05-29 ENCOUNTER — HOSPITAL ENCOUNTER (OUTPATIENT)
Facility: HOSPITAL | Age: 38
Discharge: HOME | End: 2023-05-29
Attending: STUDENT IN AN ORGANIZED HEALTH CARE EDUCATION/TRAINING PROGRAM | Admitting: STUDENT IN AN ORGANIZED HEALTH CARE EDUCATION/TRAINING PROGRAM

## 2023-05-29 VITALS
TEMPERATURE: 98 F | HEART RATE: 77 BPM | DIASTOLIC BLOOD PRESSURE: 62 MMHG | SYSTOLIC BLOOD PRESSURE: 113 MMHG | RESPIRATION RATE: 18 BRPM

## 2023-05-29 PROBLEM — O36.8190 DECREASED FETAL MOVEMENT: Status: ACTIVE | Noted: 2021-02-27

## 2023-05-29 NOTE — PROGRESS NOTES
L&D Triage Note - OB/GYN  Aaron Membreno 30 y o  female MRN: [id number]  Unit/Bed#: LD TRIAGE 3 Encounter: [id number]      ASSESSMENT:    Aaron Membreno is a 30 y o  Abelino Valera at 37w2d presenting for evaluation of decreased fetal movement    PLAN:    1) Decreased fetal movement:   FHT: reactive   SANDRA: 20cm    2) Patient given kick count instructions and provided reassurance about variations in fetal movement    3) Continue routine prenatal care  4) Discharge from [facility] triage with  labor precautions    - Reviewed rupture of membranes, false vs true labor, decreased fetal movement, and vaginal bleeding   - Pt to call provider with any concerns and follow up at her next scheduled prenatal appointment    - Case discussed with Dr Sigifredo Bender:    Aaron Membreno 30 y o  Abelino Valera at 36w4d with an Estimated Date of Delivery: 23 presented to triage complaining of decreased fetal movement today  She states that baby has been moving but less than usual  She reports 6 counts in 2 hours  She denies contractions , leakage of fluid and vaginal bleeding  She denies recent fall and abdominal trauma  She denies fever, chills and recent infections         Her current obstetrical history is significant for 37w2d gestational age pregnancy complicated by obesity    Her past obstetrical history is significant for one full term  delivery     Contractions: no  Leakage of fluid: no  Vaginal Bleeding: no  Fetal movement: present, decreased     OBJECTIVE:    Vitals:    23 1228   BP: 113/62   Pulse: 77   Resp: 18   Temp: 98 °F (36 7 °C)       ROS:  Constitutional: Negative  Respiratory: Negative  Cardiovascular: Negative    Gastrointestinal: Negative    General Physical Exam:  General: in no apparent distress  Cardiovascular: Cor RRR  Lungs: non-labored breathing  Abdomen: abdomen is soft without significant tenderness, masses, organomegaly or guarding  Lower extremeties: nontender    Fetal monitoring:  FHT:  150 bpm/ Moderate 6 - 25 bpm / 15 x 15  accelerations present, no decelerations  [place]: occasional  Infrequent nonpainful contractions            Abd  US   SANDRA      - Q1 4 26cm     - Q2 5 16cm     - Q3 6 19cm     - Q4 5 32cm     - Total: 20 cm   Placenta: anterior    Presentation: cephalic     _          Late entry due to acuity on L&D floor and patient care         Julián Ronquillo MD,  OBGYN PGY-1  5/29/2023 1:16 PM

## 2023-05-29 NOTE — TELEPHONE ENCOUNTER
"Regarding: Decreased fetal movement  ----- Message from Katharine Cline sent at 5/29/2023 11:28 AM EDT -----  \"I am 36 weeks pregnant and I haven't been feeling my baby move at all today  \"    "

## 2023-05-29 NOTE — PROCEDURES
Norberto Spaulding, a [place] at 80K9O with an AVE of 6/22/2023, by Last Menstrual Period, was seen at [address] for the following procedure(s): $Procedure Type: SANDRA, NST]    Nonstress Test  Reason for NST: Routine  Variability: Moderate  Decelerations: None  Accelerations: Yes  Acoustic Stimulator: No  Baseline: 140 BPM  Uterine Irritability: No  Contractions: Not present  Contraction Frequency (minutes): 0 min    4 Quadrant SANDRA  SANDRA Q1 (cm): 5 3 cm  SANDRA Q2 (cm): 6 2 cm  SANDRA Q3 (cm): 4 3 cm  SANDRA Q4 (cm): 5 2 cm  SANDRA TOTAL (cm): 21 cm  LVP (cm): 6 2 cm              Interpretation  Nonstress Test Interpretation: Reactive  Overall Impression: Sushila Jacome MD  OBGYn PGY-1

## 2023-05-31 ENCOUNTER — ROUTINE PRENATAL (OUTPATIENT)
Dept: OBGYN CLINIC | Facility: CLINIC | Age: 38
End: 2023-05-31

## 2023-05-31 VITALS — WEIGHT: 290 LBS | SYSTOLIC BLOOD PRESSURE: 118 MMHG | DIASTOLIC BLOOD PRESSURE: 68 MMHG | BODY MASS INDEX: 46.81 KG/M2

## 2023-05-31 DIAGNOSIS — Z98.891 S/P PRIMARY LOW TRANSVERSE C-SECTION: ICD-10-CM

## 2023-05-31 DIAGNOSIS — Z3A.36 36 WEEKS GESTATION OF PREGNANCY: ICD-10-CM

## 2023-05-31 DIAGNOSIS — Z34.83 PRENATAL CARE, SUBSEQUENT PREGNANCY, THIRD TRIMESTER: Primary | ICD-10-CM

## 2023-05-31 DIAGNOSIS — O99.213 OBESITY AFFECTING PREGNANCY IN THIRD TRIMESTER: ICD-10-CM

## 2023-05-31 DIAGNOSIS — O40.3XX0 POLYHYDRAMNIOS IN THIRD TRIMESTER COMPLICATION, SINGLE OR UNSPECIFIED FETUS: ICD-10-CM

## 2023-05-31 LAB
SL AMB  POCT GLUCOSE, UA: NORMAL
SL AMB POCT URINE PROTEIN: NORMAL

## 2023-05-31 PROCEDURE — 87150 DNA/RNA AMPLIFIED PROBE: CPT | Performed by: PHYSICIAN ASSISTANT

## 2023-05-31 NOTE — PROGRESS NOTES
Prenatal Visit   36w6d  Csection scheduled 6/19/23  Wash given today    Labs UTD  Breast pump ordered  GBS today     No LOF, VB, or CTX  Pt has +FM  Patients urine is neg/neg  BH and sharp pain in cervix    More discharge, feels more tired and has diarrhea   Cervix check today  No further questions/concerns today

## 2023-05-31 NOTE — ASSESSMENT & PLAN NOTE
Azam Mas  is a 30 y o   @36w6d who presents for routine prenatal visit  She is scheduled for RCS and BS on 23  She is now re-considering this  Has given some thought to the option of TOLAC and IOL but is not fully decided on this  She is inquiring if IOL could be performed prior to 39 wks at this time  Reviewed there is no medical indication to deliver prior to 39 wks via  or IOL at this time  Discussed the IOL process in detail with patient and reviewed the soonest this could be planned would be 39 wks  Reviewed may not be guaranteed a 39 wk induction spot pending IOL schedule  Reviewed the option of keeping RCS as scheduled and planning for TOLAC in the event natural labor occurs  She believes she would like to go this route  Will plan to keep RCS as scheduled for now and if natural labor occurs prior attempt TOLAC  If she changes her mind and truly does desire IOL advised to call the office asap  Cervix fingertip/0/-5    Growth scan- EFW82% at 32 wks, polyhydramnios; having weekly NST/AFIs    NST today- pt on monitor approx 35 min  NST reactive and reviewed by Dr Ariela Payton  Advised to stop ASA  GBS collected today  Reports good fetal movement  Denies LOF, vaginal bleeding, regular uterine contractions, cramping, headaches or visual changes  Reviewed PTL/Labor precautions and FKC

## 2023-05-31 NOTE — ASSESSMENT & PLAN NOTE
See note above  Plan to keep RCS as scheduled; if natural labor occurs will plan for TOLAC  If changes her mind and desires IOL call office asap  Natalya wash and instructions given today

## 2023-05-31 NOTE — PROGRESS NOTES
36 weeks gestation of pregnancy  Farnaz Lauren  is a 30 y o  Gianni Montes [place] @36w6d who presents for routine prenatal visit  She is scheduled for RCS and BS on 23  She is now re-considering this  Has given some thought to the option of TOLAC and IOL but is not fully decided on this  She is inquiring if IOL could be performed prior to 39 wks at this time  Reviewed there is no medical indication to deliver prior to 39 wks via  or IOL at this time  Discussed the IOL process in detail with patient and reviewed the soonest this could be planned would be 39 wks  Reviewed may not be guaranteed a 39 wk induction spot pending IOL schedule  Reviewed the option of keeping RCS as scheduled and planning for TOLAC in the event natural labor occurs  She believes she would like to go this route  Will plan to keep RCS as scheduled for now and if natural labor occurs prior attempt TOLAC  If she changes her mind and truly does desire IOL advised to call the office asap  Cervix fingertip/0/-5    Growth scan- EFW82% at 32 wks, polyhydramnios; having weekly NST/AFIs    NST today- pt on monitor approx 35 min  NST reactive and reviewed by Dr Orion Javier  Advised to stop ASA  GBS collected today  Reports good fetal movement  Denies LOF, vaginal bleeding, regular uterine contractions, cramping, headaches or visual changes  Reviewed PTL/Labor precautions and FKC            S/P primary low transverse   See note above  Plan to keep RCS as scheduled; if natural labor occurs will plan for TOLAC  If changes her mind and desires IOL call office asap  [place] wash and instructions given today    Polyhydramnios in third trimester  Has weekly NST/SANDRA scheduled  SANDRA tomorrow with MFM  NST today    Obesity complicating pregnancy  91# TWG  Has weekly APFS testing scheduled

## 2023-06-01 ENCOUNTER — ROUTINE PRENATAL (OUTPATIENT)
Dept: PERINATAL CARE | Facility: OTHER | Age: 38
End: 2023-06-01

## 2023-06-01 VITALS
SYSTOLIC BLOOD PRESSURE: 132 MMHG | BODY MASS INDEX: 46.7 KG/M2 | HEART RATE: 90 BPM | HEIGHT: 66 IN | WEIGHT: 290.6 LBS | DIASTOLIC BLOOD PRESSURE: 70 MMHG

## 2023-06-01 DIAGNOSIS — O40.3XX0 POLYHYDRAMNIOS IN THIRD TRIMESTER COMPLICATION, SINGLE OR UNSPECIFIED FETUS: ICD-10-CM

## 2023-06-01 DIAGNOSIS — Z3A.37 37 WEEKS GESTATION OF PREGNANCY: ICD-10-CM

## 2023-06-01 NOTE — PROGRESS NOTES
The patient was seen today for an ultrasound  Please see ultrasound report (located under Ob Procedures) for additional details  Thank you very much for allowing us to participate in the care of this very nice patient  Should you have any questions, please do not hesitate to contact me  Panfilo Mandujano MD [address]  Attending Physician, [place]

## 2023-06-02 LAB — GP B STREP DNA SPEC QL NAA+PROBE: NEGATIVE

## 2023-06-03 NOTE — PATIENT INSTRUCTIONS
Please go to Labor and Delivery now for evaluation  The address of [address] [place] [address] is [place] [phone] ([facility] [place])

## 2023-06-05 ENCOUNTER — ROUTINE PRENATAL (OUTPATIENT)
Dept: PERINATAL CARE | Facility: OTHER | Age: 38
End: 2023-06-05
Payer: COMMERCIAL

## 2023-06-05 ENCOUNTER — TELEPHONE (OUTPATIENT)
Dept: OBGYN CLINIC | Facility: CLINIC | Age: 38
End: 2023-06-05

## 2023-06-05 ENCOUNTER — HOSPITAL ENCOUNTER (OUTPATIENT)
Facility: HOSPITAL | Age: 38
Discharge: HOME | End: 2023-06-05
Attending: OBSTETRICS & GYNECOLOGY | Admitting: OBSTETRICS & GYNECOLOGY
Payer: COMMERCIAL

## 2023-06-05 VITALS
BODY MASS INDEX: 46.35 KG/M2 | OXYGEN SATURATION: 98 % | TEMPERATURE: 98 F | WEIGHT: 288.4 LBS | DIASTOLIC BLOOD PRESSURE: 70 MMHG | HEART RATE: 86 BPM | RESPIRATION RATE: 18 BRPM | HEIGHT: 66 IN | SYSTOLIC BLOOD PRESSURE: 122 MMHG

## 2023-06-05 VITALS
HEIGHT: 66 IN | SYSTOLIC BLOOD PRESSURE: 122 MMHG | WEIGHT: 288.4 LBS | HEART RATE: 97 BPM | BODY MASS INDEX: 46.35 KG/M2 | DIASTOLIC BLOOD PRESSURE: 66 MMHG

## 2023-06-05 DIAGNOSIS — O40.3XX0 POLYHYDRAMNIOS IN THIRD TRIMESTER COMPLICATION, SINGLE OR UNSPECIFIED FETUS: Primary | ICD-10-CM

## 2023-06-05 DIAGNOSIS — O34.219 HISTORY OF CESAREAN DELIVERY, ANTEPARTUM: ICD-10-CM

## 2023-06-05 PROBLEM — Z3A.37 37 WEEKS GESTATION OF PREGNANCY: Status: ACTIVE | Noted: 2023-02-22

## 2023-06-05 LAB
ALBUMIN SERPL BCP-MCNC: 3.4 G/DL (ref 3.5–5)
ALP SERPL-CCNC: 118 U/L (ref 34–104)
ALT SERPL W P-5'-P-CCNC: 15 U/L (ref 7–52)
ANION GAP SERPL CALCULATED.3IONS-SCNC: 13 MMOL/L (ref 4–13)
AST SERPL W P-5'-P-CCNC: 15 U/L (ref 13–39)
BASOPHILS # BLD AUTO: 0.03 THOUSANDS/ÂΜL (ref 0–0.1)
BASOPHILS NFR BLD AUTO: 0 % (ref 0–1)
BILIRUB SERPL-MCNC: 0.44 MG/DL (ref 0.2–1)
BUN SERPL-MCNC: 7 MG/DL (ref 5–25)
CALCIUM ALBUM COR SERPL-MCNC: 9.4 MG/DL (ref 8.3–10.1)
CALCIUM SERPL-MCNC: 8.9 MG/DL (ref 8.4–10.2)
CHLORIDE SERPL-SCNC: 103 MMOL/L (ref 96–108)
CO2 SERPL-SCNC: 18 MMOL/L (ref 21–32)
CREAT SERPL-MCNC: 0.53 MG/DL (ref 0.6–1.3)
CREAT UR-MCNC: 146.4 MG/DL
EOSINOPHIL # BLD AUTO: 0.07 THOUSAND/ÂΜL (ref 0–0.61)
EOSINOPHIL NFR BLD AUTO: 1 % (ref 0–6)
ERYTHROCYTE [DISTWIDTH] IN BLOOD BY AUTOMATED COUNT: 13.2 % (ref 11.6–15.1)
GFR SERPL CREATININE-BSD FRML MDRD: 123 ML/MIN/1.73SQ M
GLUCOSE SERPL-MCNC: 73 MG/DL (ref 65–140)
HCT VFR BLD AUTO: 35.6 % (ref 34.8–46.1)
HGB BLD-MCNC: 11.8 G/DL (ref 11.5–15.4)
IMM GRANULOCYTES # BLD AUTO: 0.02 THOUSAND/UL (ref 0–0.2)
IMM GRANULOCYTES NFR BLD AUTO: 0 % (ref 0–2)
LYMPHOCYTES # BLD AUTO: 2.98 THOUSANDS/ÂΜL (ref 0.6–4.47)
LYMPHOCYTES NFR BLD AUTO: 40 % (ref 14–44)
MCH RBC QN AUTO: 28.1 PG (ref 26.8–34.3)
MCHC RBC AUTO-ENTMCNC: 33.1 G/DL (ref 31.4–37.4)
MCV RBC AUTO: 85 FL (ref 82–98)
MONOCYTES # BLD AUTO: 0.35 THOUSAND/ÂΜL (ref 0.17–1.22)
MONOCYTES NFR BLD AUTO: 5 % (ref 4–12)
NEUTROPHILS # BLD AUTO: 3.97 THOUSANDS/ÂΜL (ref 1.85–7.62)
NEUTS SEG NFR BLD AUTO: 54 % (ref 43–75)
NRBC BLD AUTO-RTO: 0 /100 WBCS
PLATELET # BLD AUTO: 267 THOUSANDS/UL (ref 149–390)
PMV BLD AUTO: 9.5 FL (ref 8.9–12.7)
POTASSIUM SERPL-SCNC: 3.6 MMOL/L (ref 3.5–5.3)
PROT SERPL-MCNC: 6.9 G/DL (ref 6.4–8.4)
PROT UR-MCNC: 19 MG/DL
PROT/CREAT UR: 0.13 MG/G{CREAT} (ref 0–0.1)
RBC # BLD AUTO: 4.2 MILLION/UL (ref 3.81–5.12)
SODIUM SERPL-SCNC: 134 MMOL/L (ref 135–147)
WBC # BLD AUTO: 7.42 THOUSAND/UL (ref 4.31–10.16)

## 2023-06-05 PROCEDURE — 80053 COMPREHEN METABOLIC PANEL: CPT

## 2023-06-05 PROCEDURE — 76819 FETAL BIOPHYS PROFIL W/O NST: CPT | Performed by: OBSTETRICS & GYNECOLOGY

## 2023-06-05 PROCEDURE — 85025 COMPLETE CBC W/AUTO DIFF WBC: CPT

## 2023-06-05 PROCEDURE — 99214 OFFICE O/P EST MOD 30 MIN: CPT | Performed by: OBSTETRICS & GYNECOLOGY

## 2023-06-05 PROCEDURE — 84156 ASSAY OF PROTEIN URINE: CPT

## 2023-06-05 PROCEDURE — NC001 PR NO CHARGE: Performed by: OBSTETRICS & GYNECOLOGY

## 2023-06-05 PROCEDURE — 99214 OFFICE O/P EST MOD 30 MIN: CPT

## 2023-06-05 PROCEDURE — 82570 ASSAY OF URINE CREATININE: CPT

## 2023-06-05 RX ORDER — METOCLOPRAMIDE 10 MG/1
10 TABLET ORAL ONCE
Status: COMPLETED | OUTPATIENT
Start: 2023-06-05 | End: 2023-06-05

## 2023-06-05 RX ORDER — CETIRIZINE HYDROCHLORIDE 10 MG/1
10 TABLET ORAL DAILY
COMMUNITY

## 2023-06-05 RX ORDER — DIPHENHYDRAMINE HCL 25 MG
25 TABLET ORAL EVERY 6 HOURS PRN
Status: DISCONTINUED | OUTPATIENT
Start: 2023-06-05 | End: 2023-06-06 | Stop reason: HOSPADM

## 2023-06-05 RX ORDER — MAGNESIUM SULFATE HEPTAHYDRATE 40 MG/ML
2 INJECTION, SOLUTION INTRAVENOUS ONCE
Status: COMPLETED | OUTPATIENT
Start: 2023-06-05 | End: 2023-06-05

## 2023-06-05 RX ORDER — ONDANSETRON 4 MG/1
4 TABLET, ORALLY DISINTEGRATING ORAL EVERY 6 HOURS PRN
Status: DISCONTINUED | OUTPATIENT
Start: 2023-06-05 | End: 2023-06-06 | Stop reason: HOSPADM

## 2023-06-05 RX ORDER — LANOLIN ALCOHOL/MO/W.PET/CERES
400 CREAM (GRAM) TOPICAL 2 TIMES DAILY
Status: DISCONTINUED | OUTPATIENT
Start: 2023-06-05 | End: 2023-06-05

## 2023-06-05 RX ADMIN — DIPHENHYDRAMINE HYDROCHLORIDE 25 MG: 25 TABLET ORAL at 21:21

## 2023-06-05 RX ADMIN — SODIUM CHLORIDE, SODIUM LACTATE, POTASSIUM CHLORIDE, AND CALCIUM CHLORIDE 1000 ML: .6; .31; .03; .02 INJECTION, SOLUTION INTRAVENOUS at 20:40

## 2023-06-05 RX ADMIN — MAGNESIUM SULFATE HEPTAHYDRATE 2 G: 40 INJECTION, SOLUTION INTRAVENOUS at 21:36

## 2023-06-05 RX ADMIN — ONDANSETRON 4 MG: 4 TABLET, ORALLY DISINTEGRATING ORAL at 21:21

## 2023-06-05 RX ADMIN — METOCLOPRAMIDE 10 MG: 10 TABLET ORAL at 21:21

## 2023-06-05 NOTE — TELEPHONE ENCOUNTER
PT HAS C SECTION SCHEDULED     37 and 4  Pt began with headache and nausea yesterday  No eye involvement  Does not usually get headache  blood pressure 115/85  Is not eating solid food but is drinking  Has MFM appt 6:30 tonight  Good FM   What f/u? {Juany Guevara  87)

## 2023-06-05 NOTE — TELEPHONE ENCOUNTER
Per [facility], pt will try tylenol, some light food and if no better in 1 hr - to triage   Pt will call back

## 2023-06-05 NOTE — LETTER
"   Date: 2023    Kamlesh Rudolph MD  [address]    Patient: Vannessa Cheng   YOB: 1985   Date of Visit: 2023   Gestational age 37w1d   Chanel Adam of this communication: Priority: coming to L&D for evaluation for BPP of 6 of 8  Dear Fabian Adan,    This patient was seen recently in our  office  Please see ultrasound report under \"OB Procedures\" tab  Please don't hesitate to contact our office with any concerns or questions        Sincerely,      Edel Bello MD  Attending Physician, [facility]      "

## 2023-06-05 NOTE — PROGRESS NOTES
[address]: Ms Ishmael Zuniga was seen today for SANDRA/BPP  The time spent on this established patient on the encounter date included 5 minutes previsit service time reviewing records and precharting, 5 minutes face-to-face service time counseling regarding results and coordinating care, and  4 minutes charting, totalling 14 minutes  Please don't hesitate to contact our office with any concerns or questions    Katlin Duckworth MD

## 2023-06-06 NOTE — PROGRESS NOTES
L&D Triage Note - OB/GYN  Mely Shaver 30 y o  female MRN: [id number]  Unit/Bed#: LD TRIAGE 4 Encounter: [id number]      ASSESSMENT/PLAN  Mely Shaver is a 30 y o   at 37w4d who presented for headache and nausea well as a BPP of 6 out of 8 at the  center, points off for breathing  1) Headache  - Preeclampsia labs were normal, UPC 0 15  -Improved with magnesium, Benadryl, reglan  -Discussed warning signs of preeclampsia and reasons to call back    2) Nausea  - Improved with Reglan    3) Contractions  - Mild and irregular, but notable on monitor  - SVE ft/50/-2  -Patient is scheduled for repeat  delivery, however if she comes in labor she would be interested in a TOLAC  -Her last  delivery was for fetal indication (lates remote from delivery) and therefore her pelvis is never been tested  4) BPP 6/8 at [facility] reactive/reassuring with extended monitoring    5) Discharge instructions  - Patient instructed to call if experiencing worsening contractions, vaginal bleeding, loss of fluid or decreased fetal movement  - Will follow up with OBGYN in office      She is a patient of Harris Levi  D/w Dr Rosalba Katz, on call OBGYN Attending Physician  ______________    SUBJECTIVE    AVE: Estimated Date of Delivery: 23    HPI:  30 y o   37w4d sent over from the  center for a 6 out of 8 BPP and a headache  Also complaining of nausea  She took some Tylenol for the headache which was a 6 out of 10 earlier today      Contractions: yes, irreg  Leakage of fluid: no  Vaginal Bleeding: no  Fetal movement: present    Her obstetrical history is significant for 1LTCS for fetal intolerance, persistent DFM    ROS:  Constitutional: Negative  Respiratory: Negative  Cardiovascular: Negative    Gastrointestinal: Negative    Physical Exam  General: Well appearing, no distress  Respiratory: Unlabored breathing  Cardiovascular: Regular "rate  Abdomen: Soft, gravid, nontender   Fundal Height: Appropriate for gestational age  Extremities: Warm and well perfused  Non tender  OBJECTIVE:  /70   Pulse 86   Temp 98 °F (36 7 °C) (Oral)   Resp 18   Ht 5' 6\" (1 676 m)   Wt 131 kg (288 lb 6 4 oz)   LMP 09/15/2022   SpO2 98%   BMI 46 55 kg/m²   Body mass index is 46 55 kg/m²    Labs:   Recent Results (from the past 24 hour(s))   CBC and differential    Collection Time: 06/05/23  8:34 PM   Result Value Ref Range    WBC 7 42 4 31 - 10 16 Thousand/uL    RBC 4 20 3 81 - 5 12 Million/uL    Hemoglobin 11 8 11 5 - 15 4 g/dL    Hematocrit 35 6 34 8 - 46 1 %    MCV 85 82 - 98 fL    MCH 28 1 26 8 - 34 3 pg    MCHC 33 1 31 4 - 37 4 g/dL    RDW 13 2 11 6 - 15 1 %    MPV 9 5 8 9 - 12 7 fL    Platelets 427 247 - 861 Thousands/uL    nRBC 0 /100 WBCs    Neutrophils Relative 54 43 - 75 %    Immat GRANS % 0 0 - 2 %    Lymphocytes Relative 40 14 - 44 %    Monocytes Relative 5 4 - 12 %    Eosinophils Relative 1 0 - 6 %    Basophils Relative 0 0 - 1 %    Neutrophils Absolute 3 97 1 85 - 7 62 Thousands/µL    Immature Grans Absolute 0 02 0 00 - 0 20 Thousand/uL    Lymphocytes Absolute 2 98 0 60 - 4 47 Thousands/µL    Monocytes Absolute 0 35 0 17 - 1 22 Thousand/µL    Eosinophils Absolute 0 07 0 00 - 0 61 Thousand/µL    Basophils Absolute 0 03 0 00 - 0 10 Thousands/µL   Comprehensive metabolic panel    Collection Time: 06/05/23  8:34 PM   Result Value Ref Range    Sodium 134 (L) 135 - 147 mmol/L    Potassium 3 6 3 5 - 5 3 mmol/L    Chloride 103 96 - 108 mmol/L    CO2 18 (L) 21 - 32 mmol/L    ANION GAP 13 4 - 13 mmol/L    BUN 7 5 - 25 mg/dL    Creatinine 0 53 (L) 0 60 - 1 30 mg/dL    Glucose 73 65 - 140 mg/dL    Calcium 8 9 8 4 - 10 2 mg/dL    Corrected Calcium 9 4 8 3 - 10 1 mg/dL    AST 15 13 - 39 U/L    ALT 15 7 - 52 U/L    Alkaline Phosphatase 118 (H) 34 - 104 U/L    Total Protein 6 9 6 4 - 8 4 g/dL    Albumin 3 4 (L) 3 5 - 5 0 g/dL    Total Bilirubin 0 44 0 20 " "- 1 00 mg/dL    eGFR 123 ml/min/1 73sq m   Protein / creatinine ratio, urine    Collection Time: 06/05/23  8:34 PM   Result Value Ref Range    Creatinine, Ur 146 4 mg/dL    Protein Urine Random 19 mg/dL    Prot/Creat Ratio, Ur 0 13 (H) 0 00 - 0 10         SVE:  Cervical Dilation: Fingertip  Cervical Effacement: 50  Fetal Station: -3  OB Examiner: Martat    FHT:  Baseline Rate: 140 bpm  Variability: Moderate 6-25 bpm  Accelerations: 15 x 15 or greater, At variable times  Decelerations: None  FHR Category: Category I    TOCO:   Contraction Frequency (minutes): 3 5-6/irritable  Contraction Duration (seconds):   Contraction Quality: Mild          Eusebio [place],   OB/GYN PGY-3  6/5/2023  11:15 PM      Portions of the record may have been created with voice recognition software  Occasional wrong word or \"sound a like\" substitutions may have occurred due to the inherent limitations of voice recognition software    Read the chart carefully and recognize, using context, where substitutions have occurred    "

## 2023-06-07 ENCOUNTER — TELEPHONE (OUTPATIENT)
Dept: OBGYN CLINIC | Facility: CLINIC | Age: 38
End: 2023-06-07

## 2023-06-08 ENCOUNTER — NURSE TRIAGE (OUTPATIENT)
Dept: OTHER | Facility: OTHER | Age: 38
End: 2023-06-08

## 2023-06-08 ENCOUNTER — ROUTINE PRENATAL (OUTPATIENT)
Dept: OBGYN CLINIC | Facility: CLINIC | Age: 38
End: 2023-06-08
Payer: COMMERCIAL

## 2023-06-08 VITALS
SYSTOLIC BLOOD PRESSURE: 118 MMHG | HEART RATE: 108 BPM | OXYGEN SATURATION: 96 % | DIASTOLIC BLOOD PRESSURE: 62 MMHG | BODY MASS INDEX: 46.71 KG/M2 | WEIGHT: 289.4 LBS

## 2023-06-08 DIAGNOSIS — Z34.83 PRENATAL CARE, SUBSEQUENT PREGNANCY, THIRD TRIMESTER: Primary | ICD-10-CM

## 2023-06-08 LAB
SL AMB  POCT GLUCOSE, UA: NORMAL
SL AMB POCT URINE PROTEIN: NORMAL

## 2023-06-08 PROCEDURE — PNV: Performed by: OBSTETRICS & GYNECOLOGY

## 2023-06-08 PROCEDURE — 81002 URINALYSIS NONAUTO W/O SCOPE: CPT | Performed by: OBSTETRICS & GYNECOLOGY

## 2023-06-08 NOTE — PROGRESS NOTES
NST/ Prenatal visit  RCS+TL [email or web address] [facility]  GBS=Negative  Pt has hibaclens  Received Breast pump  UTD Tdap  Lost mucus plug 2 days ago  Increased d/c- using a pad- filling part of a pad- changed pad 7x today by 1pm   Increased contractions not consistent  Vaginal pressure and pelvic/rectal pressure    +Fetal Movement

## 2023-06-08 NOTE — PROGRESS NOTES
NST reactive  Scheduled for   Open to TOLAC if spontaneous labor  Continue [facility]'s  SANDRA/NST next week

## 2023-06-09 NOTE — TELEPHONE ENCOUNTER
"  Reason for Disposition  • [1] Pregnant 23 or more weeks AND [2] baby moving less today by kick count  (e g , kick count < 5 in 1 hour or < 10 in 2 hours)    Answer Assessment - Initial Assessment Questions  1  FETAL MOVEMENT: \"Has the baby's movement decreased or changed significantly from normal?\" (e g , yes, no; describe)      Since patient did stress test this afternoon at office, hasn't felt the baby move much  Patient stated that she only felt baby move 4 times in the last 2 hours, and they were very soft  Stated that baby is usually very forceful with her movements  Patient ate and drank to see if that helped  2  AVE: \"What date are you expecting to deliver? \"       6/22  3  PREGNANCY: \"How many weeks pregnant are you?\"       38w0d  4  OTHER SYMPTOMS: \"Do you have any other symptoms? \" (e g , abdominal pain, leaking fluid from vagina, vaginal bleeding, etc )     Denies abdominal pain, denies any cramping or tyler coy tonight  Denies any fluid leaking from the vagina or vaginal bleeding  Patient stated she did have some vaginal bleeding, on Wednesday she thinks she lost her mucous plug; yesterday had some brownish discharge-about a tablespoon, and had some spotting throughout the day      Protocols used: PREGNANCY - DECREASED FETAL MOVEMENT-ADULT-    "

## 2023-06-09 NOTE — TELEPHONE ENCOUNTER
TC on call provider patient's concerns  On call stated if patient had normal NST today, it is very reassuring  Sent on call note from chart stating NST reactive  On call advised she didn't need to do any more kick counts  Patient can just monitor at home for signs of labor  If she still doesn’t feel the baby well tomorrow, she can be checked again, but an NST is truly reassuring for fetal status for at least 3-4 days     Patient provided with on call provider's recommendations; verbalized understanding  Asked patient to call back with any further questions or concerns

## 2023-06-09 NOTE — TELEPHONE ENCOUNTER
"Regardin weeks decreased fetal movement  ----- Message from José Manuel Morrow sent at 2023  8:58 PM EDT -----  \" I am 38 weeks and I haven't felt the baby move in the last like two hours and when I did feel any earlier they were very light  \"    "

## 2023-06-10 ENCOUNTER — NURSE TRIAGE (OUTPATIENT)
Dept: OTHER | Facility: OTHER | Age: 38
End: 2023-06-10

## 2023-06-10 ENCOUNTER — HOSPITAL ENCOUNTER (OUTPATIENT)
Facility: HOSPITAL | Age: 38
Discharge: HOME | End: 2023-06-10
Attending: STUDENT IN AN ORGANIZED HEALTH CARE EDUCATION/TRAINING PROGRAM | Admitting: STUDENT IN AN ORGANIZED HEALTH CARE EDUCATION/TRAINING PROGRAM
Payer: COMMERCIAL

## 2023-06-10 VITALS
HEART RATE: 99 BPM | BODY MASS INDEX: 46.45 KG/M2 | SYSTOLIC BLOOD PRESSURE: 109 MMHG | WEIGHT: 289 LBS | HEIGHT: 66 IN | RESPIRATION RATE: 18 BRPM | DIASTOLIC BLOOD PRESSURE: 68 MMHG | TEMPERATURE: 98.2 F

## 2023-06-10 PROCEDURE — 99213 OFFICE O/P EST LOW 20 MIN: CPT

## 2023-06-10 PROCEDURE — NC001 PR NO CHARGE: Performed by: STUDENT IN AN ORGANIZED HEALTH CARE EDUCATION/TRAINING PROGRAM

## 2023-06-10 PROCEDURE — 76815 OB US LIMITED FETUS(S): CPT

## 2023-06-10 NOTE — PROCEDURES
Beulah Stovall    , a Wingate Semmes at 38w2d with an AVE of 6/22/2023, by Last Menstrual Period, was seen at [address] for the following procedure(s): $Procedure Type: SANDRA]         4 Quadrant SANDRA  SANDRA Q1 (cm): 7 cm  SANDRA Q2 (cm): 4 8 cm  SANDRA Q3 (cm): 2 9 cm  SANDRA Q4 (cm): 6 9 cm  SANDRA TOTAL (cm): 21 6 cm

## 2023-06-10 NOTE — PROGRESS NOTES
L&D Triage Note - OB/GYN  Jeanie Guy 30 y o  female MRN: [id number]  Unit/Bed#: LD TRIAGE  Encounter: [id number]      ASSESSMENT:    Jeanie Guy is a 30 y o  Jasper Lav at 38w2d with polyhydramnios presenting to triage for decreased fetal movement  SANDRA 21 6 cm, NST reactive  Appropriate for discharge    PLAN:    1) DFM  -Patient has experienced decreased fetal movement for past few days, has felt movement in triage  -NST reactive  -SANDRA 21 6cm  -Pt with appt for NST/SANDRA next Tuesday for polyhydramnios  -Pt given strict return precautions if fetal movement continues to decrease or ceases  2) Continue routine prenatal care  3) Discharge from [facility] triage with  labor precautions    - Reviewed rupture of membranes, false vs true labor, decreased fetal movement, and vaginal bleeding   - Pt to call provider with any concerns and follow up at her next scheduled prenatal appointment    - Case discussed with Dr Vanessa Cheng:    Jeanie Guy 30 y o  J[place] Lav at 38w2d with an Estimated Date of Delivery: 23 presenting to triage with decreased fetal movement for past few days  She used to have frequent fetal movement at certain times of the day, and has noted decreased fetal movement at those times, for example, last night  She has polyhydramnios, and does go weekly for NST/SANDRA with MFM      Her current obstetrical history is significant for polyhydramnios    Her past obstetrical history is significant for previous     Contractions: none  Leakage of fluid: none  Vaginal Bleeding: none  Fetal movement: present    OBJECTIVE:    Vitals:    06/10/23 0639   BP: 109/68   Pulse: 99   Resp: 18   Temp: 98 2 °F (36 8 °C)       ROS:  Constitutional: Negative  Respiratory: Negative  Cardiovascular: Negative    Gastrointestinal: Negative    General Physical Exam:  General: in no apparent distress  Cardiovascular: No murmurs  Lungs: non-labored breathing  Abdomen: abdomen is soft without significant tenderness, masses, organomegaly or guarding  Lower extremeties: nontender    Cervical Exam  FHT:  Baseline Rate: 140 bpm  Variability: Moderate 6-25 bpm  Accelerations: 15 x 15 or greater  FHR Category: Category I    TOCO:   Contraction Frequency (minutes): 0    Lab Results   Component Value Date    HCT 35 6 2023    HGB 11 8 2023     2023    WBC 7 42 2023     Lab Results   Component Value Date    ALT 15 2023    AST 15 2023    BUN 7 2023     2023    CO2 18 (L) 2023    CREATININE 0 53 (L) 2023    K 3 6 2023       Imagin Quadrant SANDRA  SANDRA Q1 (cm): 7 cm  SANDRA Q2 (cm): 4 8 cm  SANDRA Q3 (cm): 2 9 cm  SANDRA Q4 (cm): 6 9 cm  SANDRA TOTAL (cm): 21 6 cm    Sharmin Vazquez MD,  OBGYN PGY-1  6/10/2023 10:44 AM

## 2023-06-10 NOTE — TELEPHONE ENCOUNTER
"    Reason for Disposition  • [1] Pregnant 23 or more weeks AND [2] baby moving less today by kick count  (e g , kick count < 5 in 1 hour or < 10 in 2 hours)    Answer Assessment - Initial Assessment Questions  1  FETAL MOVEMENT: \"Has the baby's movement decreased or changed significantly from normal?\" (e g , yes, no; describe)      Yes decreased    2  AVE: \"What date are you expecting to deliver? \"       6/19    3  PREGNANCY: \"How many weeks pregnant are you?\"    38w2d    4  OTHER SYMPTOMS: \"Do you have any other symptoms? \" (e g , abdominal pain, leaking fluid from vagina, vaginal bleeding, etc )  Irregular contractions, headache    Protocols used: PREGNANCY - DECREASED OR ABNORMAL FETAL MOVEMENT-ADULT-      "

## 2023-06-13 ENCOUNTER — ROUTINE PRENATAL (OUTPATIENT)
Dept: PERINATAL CARE | Facility: OTHER | Age: 38
End: 2023-06-13

## 2023-06-13 VITALS
WEIGHT: 290.4 LBS | HEIGHT: 66 IN | DIASTOLIC BLOOD PRESSURE: 70 MMHG | BODY MASS INDEX: 46.67 KG/M2 | SYSTOLIC BLOOD PRESSURE: 102 MMHG | HEART RATE: 80 BPM

## 2023-06-13 DIAGNOSIS — O40.3XX0 POLYHYDRAMNIOS IN THIRD TRIMESTER COMPLICATION, SINGLE OR UNSPECIFIED FETUS: Primary | ICD-10-CM

## 2023-06-13 PROCEDURE — NC001 PR NO CHARGE: Performed by: OBSTETRICS & GYNECOLOGY

## 2023-06-15 ENCOUNTER — ROUTINE PRENATAL (OUTPATIENT)
Dept: OBGYN CLINIC | Facility: CLINIC | Age: 38
End: 2023-06-15
Payer: COMMERCIAL

## 2023-06-15 VITALS
DIASTOLIC BLOOD PRESSURE: 64 MMHG | BODY MASS INDEX: 46.68 KG/M2 | WEIGHT: 289.2 LBS | HEART RATE: 90 BPM | OXYGEN SATURATION: 99 % | SYSTOLIC BLOOD PRESSURE: 118 MMHG

## 2023-06-15 DIAGNOSIS — O09.523 MULTIGRAVIDA OF ADVANCED MATERNAL AGE IN THIRD TRIMESTER: ICD-10-CM

## 2023-06-15 DIAGNOSIS — Z3A.39 39 WEEKS GESTATION OF PREGNANCY: ICD-10-CM

## 2023-06-15 DIAGNOSIS — O99.213 OBESITY AFFECTING PREGNANCY IN THIRD TRIMESTER: ICD-10-CM

## 2023-06-15 DIAGNOSIS — Z34.83 PRENATAL CARE, SUBSEQUENT PREGNANCY, THIRD TRIMESTER: Primary | ICD-10-CM

## 2023-06-15 DIAGNOSIS — O34.219 HISTORY OF CESAREAN DELIVERY, ANTEPARTUM: ICD-10-CM

## 2023-06-15 LAB
SL AMB  POCT GLUCOSE, UA: NEGATIVE
SL AMB POCT URINE PROTEIN: NEGATIVE

## 2023-06-15 PROCEDURE — PNV: Performed by: OBSTETRICS & GYNECOLOGY

## 2023-06-15 PROCEDURE — 81002 URINALYSIS NONAUTO W/O SCOPE: CPT | Performed by: OBSTETRICS & GYNECOLOGY

## 2023-06-15 NOTE — PROGRESS NOTES
Problem List Items Addressed This Visit        Other    Obesity complicating pregnancy    AMA (advanced maternal age) multigravida 35+    43 weeks gestation of pregnancy     Daliabrittny  is doing well, just ready for baby  C/S is on Monday  Baby is active  No bleeding/LOF  Some irregular contractions  No questions or concerns at this time            History of  delivery, antepartum   Other Visit Diagnoses     Prenatal care, subsequent pregnancy, third trimester    -  Primary    Relevant Orders    POCT urine dip (Completed)

## 2023-06-15 NOTE — PROGRESS NOTES
OB- 39w0d / NST today  C/S scheduled with Dr Adilia Sol 6/19/23- has wash instructions  GBS negative  A positive  Denies leakage of fluid, cramping, bleeding

## 2023-06-15 NOTE — ASSESSMENT & PLAN NOTE
Nguyen Baez is doing well, just ready for baby  C/S is on Monday  Baby is active  No bleeding/LOF  Some irregular contractions  No questions or concerns at this time

## 2023-06-17 PROCEDURE — NC001 PR NO CHARGE: Performed by: STUDENT IN AN ORGANIZED HEALTH CARE EDUCATION/TRAINING PROGRAM

## 2023-06-18 ENCOUNTER — NURSE TRIAGE (OUTPATIENT)
Dept: OTHER | Facility: OTHER | Age: 38
End: 2023-06-18

## 2023-06-18 NOTE — H&P
[address] 30 y o  female MRN: [id number]  Unit/Bed#:  Encounter: [id number]    Assessment: 30 y o   at 39w2d admitted for RLTCS and bilateral salpingectomy  Postpartum contraception plan: desires bilateral salpingectomy     Plan:   History of  delivery, antepartum  Assessment & Plan  To OR for RLTCS    Polyhydramnios in third trimester  Assessment & Plan  Last SANDRA 23 25 8    39 weeks gestation of pregnancy  Assessment & Plan  Here for RLTCS   NPO    cc/hr   Anesthesia consult for spinal analgesia   Ancef 3 grams for surgical ppx   Admission labs - CBC/Syphillis Screen/Type and Screen   Rh positive - postpartum rhogam not indicated     Obesity complicating pregnancy  Assessment & Plan  BMI 46 68 kg/m3  Plan for Lovenox 40 mg BID for DVT ppx beginning POD #1        Discussed case and plan w/ Dr Yared Patricio      Chief Complaint: I am here for my csection     HPI: Raimundo Randall is a 30 y o  Mae Hartman with an AVE of 2023, by Last Menstrual Period at 39w2d who is being admitted for RLTCS  She denies having uterine contractions, has no LOF, and reports no VB  She states she has felt good FM  Patient Active Problem List   Diagnosis   • GERD without esophagitis   • Esophageal dysphagia   • Back pain affecting pregnancy in second trimester   • Obesity complicating pregnancy   • Pelvic pain in pregnancy   • Decreased fetal movement   • S/P primary low transverse    • Upper respiratory tract infection   • AMA (advanced maternal age) multigravida 35+   • 44 weeks gestation of pregnancy   • Prenatal care in third trimester   • Polyhydramnios in third trimester   • History of  delivery, antepartum       Baby complications/comments: Last Growth 23 EFW 82%tile, AC 92%tile, BPD 85%tile, HC 86%tile, Femur 46%tile     Review of Systems   Constitutional: Negative  HENT: Negative  Eyes: Negative  Respiratory: Negative  Cardiovascular: Negative  Gastrointestinal: Negative  Endocrine: Negative  Genitourinary: Negative  Musculoskeletal: Negative  Skin: Negative  Allergic/Immunologic: Negative  Neurological: Negative  Hematological: Negative  Psychiatric/Behavioral: Negative  OB Hx:  OB History    Para Term  AB Living   2 1 1 0 0 1   SAB IAB Ectopic Multiple Live Births   0 0 0 0 1      # Outcome Date GA Lbr Mikey/2nd Weight Sex Delivery Anes PTL Lv   2 Current            1 Term 21 38w1d  3050 g (6 lb 11 6 oz) F CS-LTranv Spinal N RAMSEY       Past Medical Hx:  Past Medical History:   Diagnosis Date   • Abnormal Pap smear of cervix    • Gastritis    • HPV (human papilloma virus) infection    • Varicella        Past Surgical hx:  Past Surgical History:   Procedure Laterality Date   • ANKLE SURGERY Left    • CERVIX LESION DESTRUCTION      Laser- in [facility]      • COLPOSCOPY     • MOUTH SURGERY     • UT  DELIVERY ONLY N/A 2021    Procedure:  SECTION (); Surgeon: Dorcas Laguerre MD;  Location: [facility];  Service: Obstetrics   • UT ESOPHAGOGASTRODUODENOSCOPY TRANSORAL DIAGNOSTIC N/A 2018    Procedure: ESOPHAGOGASTRODUODENOSCOPY (EGD); Surgeon: Isabel Davila MD;  Location: [facility] GI LAB; Service: Gastroenterology   • TONSILLECTOMY     • WISDOM TOOTH EXTRACTION       No Known Allergies    Medications Prior to Admission   Medication   • cetirizine (ZyrTEC) 10 mg tablet   • omeprazole (PriLOSEC) 20 mg delayed release capsule   • Prenatal MV-Min-Fe Fum-FA-DHA (PRENATAL 1 PO)   • acetaminophen (TYLENOL) 500 mg tablet       Objective:  /56 HR 93    Physical Exam:  Physical Exam  Constitutional:       General: She is not in acute distress  Appearance: Normal appearance  HENT:      Head: Normocephalic and atraumatic  Cardiovascular:      Rate and Rhythm: Normal rate and regular rhythm     Pulmonary:      Effort: Pulmonary effort is normal       Breath sounds: Normal breath sounds  Abdominal:      Palpations: Abdomen is soft  Tenderness: There is no abdominal tenderness  Comments: Gravid   Musculoskeletal:      Right lower leg: No tenderness  Left lower leg: No tenderness  Neurological:      General: No focal deficit present  Mental Status: She is alert  Skin:     General: Skin is warm and dry     Psychiatric:         Mood and Affect: Mood normal               FHT:  Baseline 140 bpm   Variability: Moderate 6-25 bpm   Accelerations: Present 15x15 at variable times   Decelerations: none     TOCO:   Q5    Lab Results   Component Value Date    WBC 6 80 06/19/2023    HGB 11 9 06/19/2023    HCT 36 3 06/19/2023     06/19/2023     Lab Results   Component Value Date    K 3 6 06/05/2023     06/05/2023    CO2 18 (L) 06/05/2023    BUN 7 06/05/2023    CREATININE 0 53 (L) 06/05/2023    AST 15 06/05/2023    ALT 15 06/05/2023     Prenatal Labs: Reviewed      Blood type: A positive  Antibody: negative  GBS: negative  HIV: non-reactive  Rubella: immune  RPR: non-reactive  HBsAg: negative  HCAb: negative  Chlamydia: negative  Gonorrhea: negative  Diabetes 1 hour screen: 124  3 hour glucose: not indicated  Platelets: 357  Hgb: 12 5  >2 Midnights  INPATIENT     Signature/Title: Dora Wells MD  Date: 6/19/2023  Time: 9:21 AM

## 2023-06-18 NOTE — ASSESSMENT & PLAN NOTE
, Hgb 11 9 --> 10 1  Voiding spontaneously  Pain: Tylenol and toradol scheduled, [facility] PRN    FEN: Tolerating regular diet  DVT ppx: SCDs and Lovenox 40mg BID  Passing flatus   Incision C/D/I

## 2023-06-18 NOTE — TELEPHONE ENCOUNTER
Patient calling in today because she is scheduled for a  tomorrow and no one called to give her instructions or tell her what time she should arrive  Informed the patient that her  is scheduled for tomorrow at 1000 AM at the [facility] and instructed her to arrive there at 0800  Instructed her that she should have nothing to eat or drink after midnight tonight, to wash with chlorhexidine soap this evening and tomorrow morning and to remove all jewelry prior to coming to the hospital  Recommended patient leave all belongs not need immediately after birth in the car  Patient verbalized understanding and has no further questions or concerns at this time

## 2023-06-18 NOTE — TELEPHONE ENCOUNTER
"Regarding: needs scheduled  instructions  ----- Message from Beatriz Swenson sent at 2023  9:15 AM EDT -----  \" I'm scheduled for a  tomorrow and I haven't received any information about what I need to do or what time to be there  \"    "

## 2023-06-18 NOTE — TELEPHONE ENCOUNTER
Reason for Disposition  • Health Information question, no triage required and triager able to answer question    Protocols used: INFORMATION ONLY CALL - NO TRIAGE-ADULT-

## 2023-06-19 ENCOUNTER — ANESTHESIA EVENT (OUTPATIENT)
Dept: LABOR AND DELIVERY | Facility: HOSPITAL | Age: 38
End: 2023-06-19
Payer: COMMERCIAL

## 2023-06-19 ENCOUNTER — HOSPITAL ENCOUNTER (INPATIENT)
Facility: HOSPITAL | Age: 38
LOS: 2 days | Discharge: HOME | End: 2023-06-21
Attending: STUDENT IN AN ORGANIZED HEALTH CARE EDUCATION/TRAINING PROGRAM | Admitting: STUDENT IN AN ORGANIZED HEALTH CARE EDUCATION/TRAINING PROGRAM
Payer: COMMERCIAL

## 2023-06-19 ENCOUNTER — ANESTHESIA (OUTPATIENT)
Dept: LABOR AND DELIVERY | Facility: HOSPITAL | Age: 38
End: 2023-06-19
Payer: COMMERCIAL

## 2023-06-19 DIAGNOSIS — O34.219 PREVIOUS CESAREAN SECTION COMPLICATING PREGNANCY: ICD-10-CM

## 2023-06-19 DIAGNOSIS — Z30.2 REQUEST FOR STERILIZATION: ICD-10-CM

## 2023-06-19 DIAGNOSIS — Z3A.39 39 WEEKS GESTATION OF PREGNANCY: ICD-10-CM

## 2023-06-19 DIAGNOSIS — Z98.891 S/P REPEAT LOW TRANSVERSE C-SECTION: Primary | ICD-10-CM

## 2023-06-19 LAB
ABO GROUP BLD: NORMAL
BASE EXCESS BLDCOA CALC-SCNC: -1.8 MMOL/L (ref 3–11)
BASE EXCESS BLDCOV CALC-SCNC: -1.1 MMOL/L (ref 1–9)
BASOPHILS # BLD AUTO: 0.04 THOUSANDS/ÂΜL (ref 0–0.1)
BASOPHILS NFR BLD AUTO: 1 % (ref 0–1)
BLD GP AB SCN SERPL QL: NEGATIVE
EOSINOPHIL # BLD AUTO: 0.05 THOUSAND/ÂΜL (ref 0–0.61)
EOSINOPHIL NFR BLD AUTO: 1 % (ref 0–6)
ERYTHROCYTE [DISTWIDTH] IN BLOOD BY AUTOMATED COUNT: 13.6 % (ref 11.6–15.1)
HCO3 BLDCOA-SCNC: 26.3 MMOL/L (ref 17.3–27.3)
HCO3 BLDCOV-SCNC: 25.5 MMOL/L (ref 12.2–28.6)
HCT VFR BLD AUTO: 36.3 % (ref 34.8–46.1)
HGB BLD-MCNC: 11.9 G/DL (ref 11.5–15.4)
HOLD SPECIMEN: NORMAL
IMM GRANULOCYTES # BLD AUTO: 0.03 THOUSAND/UL (ref 0–0.2)
IMM GRANULOCYTES NFR BLD AUTO: 0 % (ref 0–2)
LYMPHOCYTES # BLD AUTO: 2.18 THOUSANDS/ÂΜL (ref 0.6–4.47)
LYMPHOCYTES NFR BLD AUTO: 32 % (ref 14–44)
MCH RBC QN AUTO: 27.8 PG (ref 26.8–34.3)
MCHC RBC AUTO-ENTMCNC: 32.8 G/DL (ref 31.4–37.4)
MCV RBC AUTO: 85 FL (ref 82–98)
MONOCYTES # BLD AUTO: 0.29 THOUSAND/ÂΜL (ref 0.17–1.22)
MONOCYTES NFR BLD AUTO: 4 % (ref 4–12)
NEUTROPHILS # BLD AUTO: 4.21 THOUSANDS/ÂΜL (ref 1.85–7.62)
NEUTS SEG NFR BLD AUTO: 62 % (ref 43–75)
NRBC BLD AUTO-RTO: 0 /100 WBCS
O2 CT VFR BLDCOA CALC: 7 ML/DL
OXYHGB MFR BLDCOA: 32.1 %
OXYHGB MFR BLDCOV: 50.3 %
PCO2 BLDCOA: 58.1 MM[HG] (ref 30–60)
PCO2 BLDCOV: 49.5 MM HG (ref 27–43)
PH BLDCOA: 7.27 [PH] (ref 7.23–7.43)
PH BLDCOV: 7.33 [PH] (ref 7.19–7.49)
PLATELET # BLD AUTO: 239 THOUSANDS/UL (ref 149–390)
PMV BLD AUTO: 10.7 FL (ref 8.9–12.7)
PO2 BLDCOA: 17.5 MM HG (ref 5–25)
PO2 BLDCOV: 21.9 MM HG (ref 15–45)
RBC # BLD AUTO: 4.28 MILLION/UL (ref 3.81–5.12)
RH BLD: POSITIVE
SAO2 % BLDCOV: 11 ML/DL
SPECIMEN EXPIRATION DATE: NORMAL
TREPONEMA PALLIDUM IGG+IGM AB [PRESENCE] IN SERUM OR PLASMA BY IMMUNOASSAY: NORMAL
WBC # BLD AUTO: 6.8 THOUSAND/UL (ref 4.31–10.16)

## 2023-06-19 PROCEDURE — 59510 CESAREAN DELIVERY: CPT | Performed by: STUDENT IN AN ORGANIZED HEALTH CARE EDUCATION/TRAINING PROGRAM

## 2023-06-19 PROCEDURE — 85025 COMPLETE CBC W/AUTO DIFF WBC: CPT

## 2023-06-19 PROCEDURE — 58611 LIGATE OVIDUCT(S) ADD-ON: CPT | Performed by: STUDENT IN AN ORGANIZED HEALTH CARE EDUCATION/TRAINING PROGRAM

## 2023-06-19 PROCEDURE — 86901 BLOOD TYPING SEROLOGIC RH(D): CPT

## 2023-06-19 PROCEDURE — 86780 TREPONEMA PALLIDUM: CPT

## 2023-06-19 PROCEDURE — 86900 BLOOD TYPING SEROLOGIC ABO: CPT

## 2023-06-19 PROCEDURE — 82805 BLOOD GASES W/O2 SATURATION: CPT | Performed by: STUDENT IN AN ORGANIZED HEALTH CARE EDUCATION/TRAINING PROGRAM

## 2023-06-19 PROCEDURE — 88302 TISSUE EXAM BY PATHOLOGIST: CPT | Performed by: PATHOLOGY

## 2023-06-19 PROCEDURE — 86850 RBC ANTIBODY SCREEN: CPT

## 2023-06-19 RX ORDER — FENTANYL CITRATE/PF 50 MCG/ML
50 SYRINGE (ML) INJECTION
Status: DISCONTINUED | OUTPATIENT
Start: 2023-06-19 | End: 2023-06-19

## 2023-06-19 RX ORDER — ACETAMINOPHEN 325 MG/1
650 TABLET ORAL EVERY 6 HOURS SCHEDULED
Status: DISCONTINUED | OUTPATIENT
Start: 2023-06-19 | End: 2023-06-21 | Stop reason: HOSPADM

## 2023-06-19 RX ORDER — OXYCODONE HYDROCHLORIDE 5 MG/1
5 TABLET ORAL EVERY 4 HOURS PRN
Status: DISCONTINUED | OUTPATIENT
Start: 2023-06-20 | End: 2023-06-21 | Stop reason: HOSPADM

## 2023-06-19 RX ORDER — KETOROLAC TROMETHAMINE 30 MG/ML
INJECTION, SOLUTION INTRAMUSCULAR; INTRAVENOUS AS NEEDED
Status: DISCONTINUED | OUTPATIENT
Start: 2023-06-19 | End: 2023-06-19

## 2023-06-19 RX ORDER — KETOROLAC TROMETHAMINE 30 MG/ML
30 INJECTION, SOLUTION INTRAMUSCULAR; INTRAVENOUS EVERY 6 HOURS
Status: DISCONTINUED | OUTPATIENT
Start: 2023-06-19 | End: 2023-06-20

## 2023-06-19 RX ORDER — ONDANSETRON 2 MG/ML
4 INJECTION INTRAMUSCULAR; INTRAVENOUS ONCE AS NEEDED
Status: DISCONTINUED | OUTPATIENT
Start: 2023-06-19 | End: 2023-06-19

## 2023-06-19 RX ORDER — OXYTOCIN/RINGER'S LACTATE 30/500 ML
62.5 PLASTIC BAG, INJECTION (ML) INTRAVENOUS ONCE
Status: COMPLETED | OUTPATIENT
Start: 2023-06-19 | End: 2023-06-19

## 2023-06-19 RX ORDER — HYDROMORPHONE HCL/PF 1 MG/ML
0.5 SYRINGE (ML) INJECTION
Status: DISCONTINUED | OUTPATIENT
Start: 2023-06-19 | End: 2023-06-19

## 2023-06-19 RX ORDER — ONDANSETRON 2 MG/ML
INJECTION INTRAMUSCULAR; INTRAVENOUS AS NEEDED
Status: DISCONTINUED | OUTPATIENT
Start: 2023-06-19 | End: 2023-06-19

## 2023-06-19 RX ORDER — PROMETHAZINE HYDROCHLORIDE 25 MG/ML
6.25 INJECTION, SOLUTION INTRAMUSCULAR; INTRAVENOUS ONCE AS NEEDED
Status: DISCONTINUED | OUTPATIENT
Start: 2023-06-19 | End: 2023-06-19

## 2023-06-19 RX ORDER — CEFAZOLIN SODIUM 2 G/50ML
2000 SOLUTION INTRAVENOUS ONCE
Status: COMPLETED | OUTPATIENT
Start: 2023-06-19 | End: 2023-06-19

## 2023-06-19 RX ORDER — FAMOTIDINE 10 MG/ML
20 INJECTION, SOLUTION INTRAVENOUS ONCE
Status: COMPLETED | OUTPATIENT
Start: 2023-06-19 | End: 2023-06-19

## 2023-06-19 RX ORDER — DEXAMETHASONE SODIUM PHOSPHATE 10 MG/ML
INJECTION, SOLUTION INTRAMUSCULAR; INTRAVENOUS AS NEEDED
Status: DISCONTINUED | OUTPATIENT
Start: 2023-06-19 | End: 2023-06-19

## 2023-06-19 RX ORDER — HYDROMORPHONE HCL/PF 1 MG/ML
0.5 SYRINGE (ML) INJECTION EVERY 2 HOUR PRN
Status: DISCONTINUED | OUTPATIENT
Start: 2023-06-19 | End: 2023-06-21 | Stop reason: HOSPADM

## 2023-06-19 RX ORDER — OXYTOCIN/RINGER'S LACTATE 30/500 ML
PLASTIC BAG, INJECTION (ML) INTRAVENOUS CONTINUOUS PRN
Status: DISCONTINUED | OUTPATIENT
Start: 2023-06-19 | End: 2023-06-19

## 2023-06-19 RX ORDER — DIAPER,BRIEF,INFANT-TODD,DISP
1 EACH MISCELLANEOUS DAILY PRN
Status: DISCONTINUED | OUTPATIENT
Start: 2023-06-19 | End: 2023-06-21 | Stop reason: HOSPADM

## 2023-06-19 RX ORDER — IBUPROFEN 600 MG/1
600 TABLET ORAL EVERY 6 HOURS
Status: DISCONTINUED | OUTPATIENT
Start: 2023-06-21 | End: 2023-06-20

## 2023-06-19 RX ORDER — CEFAZOLIN SODIUM 1 G/50ML
1000 SOLUTION INTRAVENOUS ONCE
Status: COMPLETED | OUTPATIENT
Start: 2023-06-19 | End: 2023-06-19

## 2023-06-19 RX ORDER — TRISODIUM CITRATE DIHYDRATE AND CITRIC ACID MONOHYDRATE 500; 334 MG/5ML; MG/5ML
30 SOLUTION ORAL 4 TIMES DAILY PRN
Status: DISCONTINUED | OUTPATIENT
Start: 2023-06-19 | End: 2023-06-21 | Stop reason: HOSPADM

## 2023-06-19 RX ORDER — DIPHENHYDRAMINE HCL 25 MG
25 TABLET ORAL EVERY 6 HOURS PRN
Status: DISCONTINUED | OUTPATIENT
Start: 2023-06-19 | End: 2023-06-21 | Stop reason: HOSPADM

## 2023-06-19 RX ORDER — CALCIUM CARBONATE 500 MG/1
1000 TABLET, CHEWABLE ORAL DAILY PRN
Status: DISCONTINUED | OUTPATIENT
Start: 2023-06-19 | End: 2023-06-21 | Stop reason: HOSPADM

## 2023-06-19 RX ORDER — LIDOCAINE HYDROCHLORIDE 10 MG/ML
INJECTION, SOLUTION EPIDURAL; INFILTRATION; INTRACAUDAL; PERINEURAL AS NEEDED
Status: DISCONTINUED | OUTPATIENT
Start: 2023-06-19 | End: 2023-06-19

## 2023-06-19 RX ORDER — ONDANSETRON 2 MG/ML
4 INJECTION INTRAMUSCULAR; INTRAVENOUS EVERY 8 HOURS PRN
Status: DISCONTINUED | OUTPATIENT
Start: 2023-06-20 | End: 2023-06-21 | Stop reason: HOSPADM

## 2023-06-19 RX ORDER — BUPIVACAINE HYDROCHLORIDE 7.5 MG/ML
INJECTION, SOLUTION INTRASPINAL AS NEEDED
Status: DISCONTINUED | OUTPATIENT
Start: 2023-06-19 | End: 2023-06-19

## 2023-06-19 RX ORDER — FENTANYL CITRATE 50 UG/ML
INJECTION, SOLUTION INTRAMUSCULAR; INTRAVENOUS AS NEEDED
Status: DISCONTINUED | OUTPATIENT
Start: 2023-06-19 | End: 2023-06-19

## 2023-06-19 RX ORDER — NALOXONE HYDROCHLORIDE 0.4 MG/ML
0.1 INJECTION, SOLUTION INTRAMUSCULAR; INTRAVENOUS; SUBCUTANEOUS
Status: ACTIVE | OUTPATIENT
Start: 2023-06-19 | End: 2023-06-20

## 2023-06-19 RX ORDER — MORPHINE SULFATE 1 MG/ML
INJECTION, SOLUTION EPIDURAL; INTRATHECAL; INTRAVENOUS AS NEEDED
Status: DISCONTINUED | OUTPATIENT
Start: 2023-06-19 | End: 2023-06-19

## 2023-06-19 RX ORDER — SODIUM CHLORIDE, SODIUM LACTATE, POTASSIUM CHLORIDE, CALCIUM CHLORIDE 600; 310; 30; 20 MG/100ML; MG/100ML; MG/100ML; MG/100ML
125 INJECTION, SOLUTION INTRAVENOUS CONTINUOUS
Status: DISCONTINUED | OUTPATIENT
Start: 2023-06-19 | End: 2023-06-21 | Stop reason: HOSPADM

## 2023-06-19 RX ORDER — ONDANSETRON 2 MG/ML
4 INJECTION INTRAMUSCULAR; INTRAVENOUS EVERY 8 HOURS PRN
Status: DISCONTINUED | OUTPATIENT
Start: 2023-06-19 | End: 2023-06-19

## 2023-06-19 RX ORDER — DOCUSATE SODIUM 100 MG/1
100 CAPSULE, LIQUID FILLED ORAL 2 TIMES DAILY
Status: DISCONTINUED | OUTPATIENT
Start: 2023-06-19 | End: 2023-06-21 | Stop reason: HOSPADM

## 2023-06-19 RX ORDER — TRISODIUM CITRATE DIHYDRATE AND CITRIC ACID MONOHYDRATE 500; 334 MG/5ML; MG/5ML
30 SOLUTION ORAL ONCE
Status: COMPLETED | OUTPATIENT
Start: 2023-06-19 | End: 2023-06-19

## 2023-06-19 RX ORDER — ONDANSETRON 2 MG/ML
4 INJECTION INTRAMUSCULAR; INTRAVENOUS EVERY 6 HOURS PRN
Status: ACTIVE | OUTPATIENT
Start: 2023-06-19 | End: 2023-06-20

## 2023-06-19 RX ORDER — NALBUPHINE HYDROCHLORIDE 10 MG/ML
5 INJECTION, SOLUTION INTRAMUSCULAR; INTRAVENOUS; SUBCUTANEOUS
Status: ACTIVE | OUTPATIENT
Start: 2023-06-19 | End: 2023-06-20

## 2023-06-19 RX ORDER — METOCLOPRAMIDE HYDROCHLORIDE 5 MG/ML
10 INJECTION INTRAMUSCULAR; INTRAVENOUS EVERY 6 HOURS PRN
Status: DISCONTINUED | OUTPATIENT
Start: 2023-06-19 | End: 2023-06-21 | Stop reason: HOSPADM

## 2023-06-19 RX ORDER — SIMETHICONE 80 MG
80 TABLET,CHEWABLE ORAL 4 TIMES DAILY PRN
Status: DISCONTINUED | OUTPATIENT
Start: 2023-06-19 | End: 2023-06-21 | Stop reason: HOSPADM

## 2023-06-19 RX ADMIN — ONDANSETRON 4 MG: 2 INJECTION INTRAMUSCULAR; INTRAVENOUS at 11:02

## 2023-06-19 RX ADMIN — CEFAZOLIN SODIUM 1000 MG: 1 SOLUTION INTRAVENOUS at 11:01

## 2023-06-19 RX ADMIN — CEFAZOLIN SODIUM 2000 MG: 2 SOLUTION INTRAVENOUS at 11:01

## 2023-06-19 RX ADMIN — SODIUM CHLORIDE, POTASSIUM CHLORIDE, SODIUM LACTATE AND CALCIUM CHLORIDE 125 ML/HR: 600; 310; 30; 20 INJECTION, SOLUTION INTRAVENOUS at 09:58

## 2023-06-19 RX ADMIN — NALBUPHINE HYDROCHLORIDE 5 MG: 10 INJECTION, SOLUTION INTRAMUSCULAR; INTRAVENOUS; SUBCUTANEOUS at 12:21

## 2023-06-19 RX ADMIN — METOCLOPRAMIDE 10 MG: 5 INJECTION, SOLUTION INTRAMUSCULAR; INTRAVENOUS at 17:04

## 2023-06-19 RX ADMIN — Medication 62.5 MILLI-UNITS/MIN: at 12:30

## 2023-06-19 RX ADMIN — SODIUM CHLORIDE, POTASSIUM CHLORIDE, SODIUM LACTATE AND CALCIUM CHLORIDE: 600; 310; 30; 20 INJECTION, SOLUTION INTRAVENOUS at 11:45

## 2023-06-19 RX ADMIN — PHENYLEPHRINE HYDROCHLORIDE 70 MCG/MIN: 10 INJECTION INTRAVENOUS at 11:00

## 2023-06-19 RX ADMIN — SODIUM CHLORIDE, SODIUM LACTATE, POTASSIUM CHLORIDE, AND CALCIUM CHLORIDE 125 ML/HR: .6; .31; .03; .02 INJECTION, SOLUTION INTRAVENOUS at 15:26

## 2023-06-19 RX ADMIN — KETOROLAC TROMETHAMINE 15 MG: 30 INJECTION, SOLUTION INTRAMUSCULAR at 11:54

## 2023-06-19 RX ADMIN — DEXAMETHASONE SODIUM PHOSPHATE 10 MG: 10 INJECTION, SOLUTION INTRAMUSCULAR; INTRAVENOUS at 11:03

## 2023-06-19 RX ADMIN — SODIUM CHLORIDE, POTASSIUM CHLORIDE, SODIUM LACTATE AND CALCIUM CHLORIDE 999.9 ML/HR: 600; 310; 30; 20 INJECTION, SOLUTION INTRAVENOUS at 09:18

## 2023-06-19 RX ADMIN — ONDANSETRON 4 MG: 2 INJECTION INTRAMUSCULAR; INTRAVENOUS at 13:44

## 2023-06-19 RX ADMIN — SODIUM CITRATE AND CITRIC ACID MONOHYDRATE 30 ML: 500; 334 SOLUTION ORAL at 09:23

## 2023-06-19 RX ADMIN — HYDROMORPHONE HYDROCHLORIDE 0.5 MG: 1 INJECTION, SOLUTION INTRAMUSCULAR; INTRAVENOUS; SUBCUTANEOUS at 15:01

## 2023-06-19 RX ADMIN — KETOROLAC TROMETHAMINE 30 MG: 30 INJECTION, SOLUTION INTRAMUSCULAR at 17:04

## 2023-06-19 RX ADMIN — FENTANYL CITRATE 15 MCG: 50 INJECTION, SOLUTION INTRAMUSCULAR; INTRAVENOUS at 10:59

## 2023-06-19 RX ADMIN — BUPIVACAINE HYDROCHLORIDE IN DEXTROSE 1.7 ML: 7.5 INJECTION, SOLUTION SUBARACHNOID at 10:59

## 2023-06-19 RX ADMIN — Medication 250 MILLI-UNITS/MIN: at 11:24

## 2023-06-19 RX ADMIN — SODIUM CHLORIDE, SODIUM LACTATE, POTASSIUM CHLORIDE, AND CALCIUM CHLORIDE 500 ML/HR: .6; .31; .03; .02 INJECTION, SOLUTION INTRAVENOUS at 22:07

## 2023-06-19 RX ADMIN — LIDOCAINE HYDROCHLORIDE 3 ML: 10 INJECTION, SOLUTION EPIDURAL; INFILTRATION; INTRACAUDAL; PERINEURAL at 10:58

## 2023-06-19 RX ADMIN — FAMOTIDINE 20 MG: 10 INJECTION, SOLUTION INTRAVENOUS at 09:49

## 2023-06-19 RX ADMIN — MORPHINE SULFATE 0.15 MG: 1 INJECTION, SOLUTION EPIDURAL; INTRATHECAL; INTRAVENOUS at 10:59

## 2023-06-19 NOTE — DISCHARGE INSTRUCTIONS

## 2023-06-19 NOTE — LACTATION NOTE
This note was copied from a baby's chart  CONSULT - LACTATION  Baby Girl Yun Mireles) [place] 0 days female MRN: [id number]    [facility] NURSERY Room / Bed:  316(N)/ 316(N) Encounter: [id number]    Maternal Information     MOTHER:  Sabra Pan  Maternal Age: 30 y o    OB History: # 1 - Date: 21, Sex: Female, Weight: 3050 g (6 lb 11 6 oz), GA: 38w1d, Delivery: , Low Transverse, Apgar1: 9, Apgar5: 9, Living: Living, Birth Comments: None    # 2 - Date: 23, Sex: Female, Weight: 3730 g (8 lb 3 6 oz), GA: 39w4d, Delivery: , Low Transverse, Apgar1: 8, Apgar5: 9, Living: Living, Birth Comments: None   Previouse breast reduction surgery? No    Lactation history:   Has patient previously breast fed: Yes   How long had patient previously breast fed: + 1yr   Previous breast feeding complications: Other (Comment) (hx of TT and correction)     Past Surgical History:   Procedure Laterality Date   • ANKLE SURGERY Left    • CERVIX LESION DESTRUCTION      [facility]     • COLPOSCOPY     • MOUTH SURGERY     • PA  DELIVERY ONLY N/A 2021    Procedure:  SECTION (); Surgeon: Ric Duckworth MD;  Location: [facility];  Service: Obstetrics   • PA ESOPHAGOGASTRODUODENOSCOPY TRANSORAL DIAGNOSTIC N/A 2018    Procedure: ESOPHAGOGASTRODUODENOSCOPY (EGD); Surgeon: Sejal De La Cruz MD;  Location: [facility];   Service: Gastroenterology   • TONSILLECTOMY     • WISDOM TOOTH EXTRACTION          Birth information:  YOB: 2021   Time of birth: 5:20 AM   Sex: female   Delivery type: , Low Transverse   Birth Weight: 3730 g (8 lb 3 6 oz)   Percent of Weight Change: 0%     Gestational Age: 43w3d   [unfilled]    Assessment     Breast and nipple assessment: normal assessment    [place] Assessment: no clinical assessment    Feeding assessment: as per mom  LATCH:  Latch: Repeated attempts, hold nipple in mouth, stimulate to suck   Audible Swallowing: A few with stimulation   Type of Nipple: Everted (After stimulation)   Comfort (Breast/Nipple): Soft/non-tender   Hold (Positioning): No assist from staff, mother able to position/hold infant   LATCH Score: 8          Feeding recommendations:  breast feed on demand  Mom nursed first child for over +1 after dx of TT and frenotomy  Mom has had 2 latches since birth  Ed  On early feeding cues  Ed  On positioning up  To the breast  Reviewed alignment and postioning for a deep latch  Enc  Both breasts  Enc  To call lactation to see a latch  Mom has a Medela pump    RSB/DC & handouts Reviewed    Handouts:   1st 2 weeks,   Latch Checklist    Information on hand expression given  Discussed benefits of knowing how to manually express breast including stimulating milk supply, softening nipple for latch and evacuating breast in the event of engorgement  Mom is encouraged to place baby skin to skin for feedings  Skin to skin education provided for baby placement on mother's chest, baby only in diaper, blankets below shoulders on baby's back  Skin to skin is encouraged to continue at home for feedings and between feedings  Worked on positioning infant up at chest level and starting to feed infant with nose arriving at the nipple  Then, using areolar compression to achieve a deep latch that is comfortable and exchanges optimum amounts of milk  - Start feedings on breast that last feeding ended   - allow no more than 3 hours between breast feeding sessions   - time between feedings is counted from the beginning of the first feed to the beginning of the next feeding session    Reviewed early signs of hunger, including tensing of hands and shoulders - no need to wait for open eyes  Crying is a late hunger sign  If baby is crying, soothe baby first and then attempt to latch    Reviewed normal sucking patterns: transition from stimulation to nutritive to release or non-nutritive  The goal is to see and hear lots of swallowing  Reviewed normal nursing pattern: infant could latch on one breast up to 30 minutes or until releases on own  Signs of satiation is open hand with fingers that do not grab your finger  Discussed difference in sensation of non-nutritive v nutritive sucking    Met with mother  Provided mother with Ready, Set, Baby booklet  Discussed Skin to Skin contact an benefits to mom and baby  Talked about the delay of the first bath until baby has adjusted  Spoke about the benefits of rooming in  Feeding on cue and what that means for recognizing infant's hunger  Avoidance of pacifiers for the first month discussed  Talked about exclusive breastfeeding for the first 6 months  Positioning and latch reviewed as well as showing images of other feeding positions  Discussed the properties of a good latch in any position  Reviewed hand/manual expression  Discussed s/s that baby is getting enough milk and some s/s that breastfeeding dyad may need further help  Gave information on common concerns, what to expect the first few weeks after delivery, preparing for other caregivers, and how partners can help  Resources for support also provided  Encouraged parents to call for assistance, questions, and concerns about breastfeeding  Extension provided  Provided education on growth spurts, when to introduce bottles; paced bottle feeding, and non-nutritive suck at the breast  Provided education on Signs of satiation  Encouraged to call lactation to observe a latch prior to discharge for reassurance  Encouraged to call baby and me with any questions and closely monitor output          Renate Castellon 6/19/2023 3:15 PM

## 2023-06-19 NOTE — ANESTHESIA PREPROCEDURE EVALUATION
Procedure:   SECTION () REPEAT (Uterus)  LIGATION/COAGULATION TUBAL (Bilateral: Abdomen)    30year old F  who presents for repeat C/S with PPTL    Lab Results   Component Value Date    WBC 6 80 2023    HGB 11 9 2023    HCT 36 3 2023    MCV 85 2023     2023     Relevant Problems   GI/HEPATIC   (+) Esophageal dysphagia   (+) GERD without esophagitis      GYN   (+) 39 weeks gestation of pregnancy   (+) AMA (advanced maternal age) multigravida 35+      PULMONARY   (+) Upper respiratory tract infection     Spinal Block    Patient location during procedure: OB  Reason for block: at surgeon's request and primary anesthetic  Staffing  Anesthesiologist: Wade Hoskins DO  Preanesthetic Checklist  Completed: patient identified, site marked, surgical consent, pre-op evaluation, timeout performed, IV checked, risks and benefits discussed and monitors and equipment checked  Spinal Block  Patient position: sitting  Prep: Betadine  Patient monitoring: heart rate, continuous pulse ox and frequent blood pressure checks  Approach: midline  Location: L3-4  Injection technique: single-shot  Needle  Needle type: pencil-tip   Needle gauge: 25 G  Needle length: 10 cm  Assessment  Sensory level: T10  Injection Assessment:  negative aspiration for heme, no paresthesia on injection and positive aspiration for clear CSF  Post-procedure:  site cleaned     Physical Exam    Airway    Mallampati score: II  TM Distance: >3 FB  Neck ROM: full     Dental   No notable dental hx     Cardiovascular  Rhythm: regular, Rate: normal,     Pulmonary  Breath sounds clear to auscultation,     Other Findings  Intercisor Distance > 3cm          Anesthesia Plan  ASA Score- 3     Anesthesia Type- spinal with ASA Monitors           Additional Monitors:   Airway Plan:     Comment: Discussed benefits/risks of neuraxial anesthesia including possibility of discomfort at site of injection, post-dural puncture headache, block failure, and more rare complications such as bleeding, infection, and permanent nerve damage  If block fails or there is difficulty placing neuraxial block, general anesthesia was discussed as back-up plan  Patient understands and wishes to proceed  All questions answered          Plan Factors-Exercise tolerance (METS): >4 METS  Chart reviewed  EKG reviewed  Existing labs reviewed  Induction-     Postoperative Plan- Plan for postoperative opioid use  Informed Consent- Anesthetic plan and risks discussed with patient  I personally reviewed this patient with the CRNA  Discussed and agreed on the Anesthesia Plan with the CRNA  Leland Poole

## 2023-06-19 NOTE — PLAN OF CARE
Problem: PAIN - ADULT  Goal: Verbalizes/displays adequate comfort level or baseline comfort level  Description: Interventions:  - Encourage patient to monitor pain and request assistance  - Assess pain using appropriate pain scale  - Administer analgesics based on type and severity of pain and evaluate response  - Implement non-pharmacological measures as appropriate and evaluate response  - Consider cultural and social influences on pain and pain management  - Notify physician/advanced practitioner if interventions unsuccessful or patient reports new pain  Outcome: Progressing     Problem: INFECTION - ADULT  Goal: Absence or prevention of progression during hospitalization  Description: INTERVENTIONS:  - Assess and monitor for signs and symptoms of infection  - Monitor lab/diagnostic results  - Monitor all insertion sites, i e  indwelling lines, tubes, and drains  - Monitor endotracheal if appropriate and nasal secretions for changes in amount and color  - [place] appropriate cooling/warming therapies per order  - Administer medications as ordered  - Instruct and encourage patient and family to use good hand hygiene technique  - Identify and instruct in appropriate isolation precautions for identified infection/condition  Outcome: Progressing  Goal: Absence of fever/infection during neutropenic period  Description: INTERVENTIONS:  - Monitor WBC    Outcome: Progressing     Problem: SAFETY ADULT  Goal: Patient will remain free of falls  Description: INTERVENTIONS:  - Educate patient/family on patient safety including physical limitations  - Instruct patient to call for assistance with activity   - Consult OT/PT to assist with strengthening/mobility   - Keep Call bell within reach  - Keep bed low and locked with side rails adjusted as appropriate  - Keep care items and personal belongings within reach  - Initiate and maintain comfort rounds  - Make Fall Risk Sign visible to staff  ts  - Consider moving patient to room near nurses station  Outcome: Progressing  Goal: Maintain or return to baseline ADL function  Description: INTERVENTIONS:  -  Assess patient's ability to carry out ADLs; assess patient's baseline for ADL function and identify physical deficits which impact ability to perform ADLs (bathing, care of mouth/teeth, toileting, grooming, dressing, etc )  - Assess/evaluate cause of self-care deficits   - Assess range of motion  - Assess patient's mobility; develop plan if impaired  - Assess patient's need for assistive devices and provide as appropriate  - Encourage maximum independence but intervene and supervise when necessary  - Involve family in performance of ADLs  - Assess for home care needs following discharge   - Consider OT consult to assist with ADL evaluation and planning for discharge  - Provide patient education as appropriate  Outcome: Progressing  Goal: Maintains/Returns to pre admission functional level  Description: INTERVENTIONS:  - Perform BMAT or MOVE assessment daily    - Set and communicate daily mobility goal to care team and patient/family/caregiver  - - - - Out of bed for toileting  - Record patient progress and toleration of activity level   Outcome: Progressing     Problem: Knowledge Deficit  Goal: Patient/family/caregiver demonstrates understanding of disease process, treatment plan, medications, and discharge instructions  Description: Complete learning assessment and assess knowledge base    Interventions:  - Provide teaching at level of understanding  - Provide teaching via preferred learning methods  Outcome: Progressing     Problem: DISCHARGE PLANNING  Goal: Discharge to home or other facility with appropriate resources  Description: INTERVENTIONS:  - Identify barriers to discharge w/patient and caregiver  - Arrange for needed discharge resources and transportation as appropriate  - Identify discharge learning needs (meds, wound care, etc )  - Arrange for interpretive services to assist at discharge as needed  - Refer to Case Management Department for coordinating discharge planning if the patient needs post-hospital services based on physician/advanced practitioner order or complex needs related to functional status, cognitive ability, or social support system  Outcome: Progressing

## 2023-06-19 NOTE — PROGRESS NOTES
"Progress Note - OB/GYN   Davidson Dumont 30 y o  female MRN: [id number]  Unit/Bed#:  316-01 Encounter: [id number]    Assessment:  Postop Day #0 s/p RLTCS + BS, stable    Plan:  1) Postoperative care  -  mL   - Hgb 11 9 g/dL --> f/u AM CBC   - Encourage ambulation  - Encourage breastfeeding    2) DVT ppx  - plan for Lovenox for mg BID to start POD #1    3) Dispo    - Anticipate discharge POD #2-4     Subjective/Objective   Chief Complaint:   Endorses some occasional nausea  Denies any vomiting  Dors is that she has been able to tolerate a small amount of p o  since surgery  Lochia is normal   Not yet passing flatus      Subjective:     Pain: yes, incisional, improved with meds  Tolerating PO: yes  Voiding: yes, via guzman   Flatus: no  Ambulating: no, SCDs in place   Chest pain: no  Shortness of breath: no  Leg pain: no  Lochia: minimal    Objective:     Vitals: /66   Pulse 69   Temp 98 3 °F (36 8 °C) (Oral)   Resp 20   Ht 5' 6\" (1 676 m)   Wt 131 kg (289 lb)   LMP 09/15/2022   SpO2 98%   Breastfeeding Yes   BMI 46 65 kg/m²       Intake/Output Summary (Last 24 hours) at 6/19/2023 1538  Last data filed at 6/19/2023 1323  Gross per 24 hour   Intake 1200 ml   Output 316 ml   Net 884 ml       Physical Exam:     General: AAOx3, NAD  Cardiovascular: regular rate  Respiratory: normal effort   Abdomen: soft, non-tender, non-distended, no rebound or guarding, no CVA tenderness  Pelvis: Uterine fundus firm and non-tender, at the umbilicus Pfannenstiel   Incision clean/dry/intact without signs of inflammation         Lab Results   Component Value Date    WBC 6 80 06/19/2023    HGB 11 9 06/19/2023    HCT 36 3 06/19/2023    MCV 85 06/19/2023     06/19/2023         Addi Shelby MD  6/19/2023  3:38 PM    "

## 2023-06-19 NOTE — OP NOTE
OPERATIVE REPORT  PATIENT NAME: Patrick Virk    :  1985  MRN: [id number]  Pt Location: [facility] L&D OR ROOM 02    SURGERY DATE: 2023    Surgeon(s) and Role:     * Mabel Bruner MD - Primary     * Ksenia Langley MD - Assisting    Preop Diagnosis:  Previous  section complicating pregnancy [O61 011]  Request for sterilization [Z30 2]    Post-Op Diagnosis Codes:     * Previous  section complicating pregnancy [M19 683]     * Request for sterilization [Z30 2]    Procedure(s) (LRB):   SECTION () REPEAT (N/A)  LIGATION/COAGULATION TUBAL (Bilateral)    Specimen(s):  ID Type Source Tests Collected by Time Destination   1 : PRN Tissue Fallopian Tube, Left TISSUE EXAM Edgardo Thomas RN 2023 1132    2 : PRN Tissue Fallopian Tube, Right TISSUE EXAM Mabel Bruner MD 2023 1133    A :  Cord Blood Cord BLOOD GAS, VENOUS, CORD, BLOOD GAS, ARTERIAL, CORD Mabel Bruner MD 2023 1123    B :  Tissue (Placenta on Hold) OB Only Placenta PLACENTA IN 9100 Vic Urbano MD 2023 1124        Surgical QBL:  Surgical QBL (mL): 216 mL      Drains:  Urethral Catheter Double-lumen;Non-latex 16 Fr   (Active)   Reasons to continue Urinary Catheter  Post-operative urological requirements 23 1102   Goal for Removal Voiding trial when ambulation improves 23 1102   Site Assessment Clean;Skin intact 23 1102   Collection Container Standard drainage bag 23 1102   Securement Method Securing device (Describe) 23 1102   Number of days: 0       Anesthesia Type:   Spinal    Operative Indications:  Previous  section complicating pregnancy [V00 675]  Request for sterilization [Z30 2]       [facility] [employer] Classification System:  No Multiple pregnancy, No Transverse or oblique lie, No Breech lie, Gestational age is > or =37 weeks, Multiparous, Previous uterine scar +  is [facility] 5    Operative Findings:  Minimal scar tissue of the bladder to the lower uterine segment  No other significant adhesive disease  Viable female infant in the cephalic presentation  PH arterial 7 273, base excess -1 8  PH venous 7 330, base excess -1 1    Complications:   None    Procedure and Technique:  After informed consent was obtained, the patient was taken to the operating room where spinal anesthesia was administered without difficulty  She was placed in the dorsal position with a leftward tilt  Vagina was prepped  A guzman catheter was inserted  Her abdomen was then prepped and draped in the normal sterile fashion  A Pfannenstiel skin incision 2 cm above the pubic symphysis was made with a knife and carried down to the underlying layer of fascia with the Bovie and scalpel  The fascia was incised in the midline and extended laterally with curved Mckeon scissors  The superior aspect of the fascia was grasped with Kocher clamps and elevated so the underlying rectus muscles could be dissected off the fascia both bluntly and with the scalpel  The rectus muscles were  in the midline and the peritoneum was identified and entered bluntly  This was extended superiorly and inferiorly with good visualization of the bladder  The bladder blade was inserted  Next, the vesicouterine peritoneum was identified and entered sharply with Metzenbaum scissors to create a bladder flap  The bladder blade was then replaced  A low transverse uterine incision was made and bluntly extended laterally  An amniotomy was performed for clear fluid  The operative hand was inserted into the uterus and the infant's head was flexed and elevated to the level of the hysterotomy  With appropriate fundal pressure, the shoulders and body were delivered atraumatically  The infant was stimulalted and dried on the field  After 30 second delay the cord was clamped and cut and the infant was handed off to the awaiting pediatricians   Cord gases were sent  The placenta was removed with uterine massage  The uterus was exteriorized and cleared of all clots and debris  The hysterotomy incision was then closed with 2-0 stratafix in a running fashion  A second imbricating layer of the same was completed  The uterine incision was found to be hemostatic  The ovaries and tubes were inspected and found to be normal in appearance  The right tube was elevated with babcocks and windows made along the mesosalpinx to allow for suture ligation of connecting blood vessels  The pedicles including the tubal attachment at the cornua were suture ligated with 2-0 plain gut  The same was completed on the left  Pedicles were inspected and hemostatic  The uterus was returned to the abdomen  The pericolic gutters were cleared of all clots and debris  The hysterotomy incision and tubal pedicles were inspected again and found to be hemostatic  The rectus fascial layers and the rectus muscles were examined and deemed hemostatic  The fascia was reapproximated with looped 0 PDS in a running fashion  The subcutaneous tissue was irrigated and made hemostatic with the Bovie  The subcutaneous fat was re-approximated with 2-0 plain gut  The skin was then closed with 4-0 stratafix in a subcuticular stitch  All sponge, lap and needle counts were correct x 2 and at the end of the procedure  The patient tolerated the procedure well and was transferred to the recovery room in stable condition  I was present for the entire procedure      Patient Disposition:  PACU         SIGNATURE: Xenia Leon MD  DATE: June 19, 2023  TIME: 11:56 AM

## 2023-06-19 NOTE — ANESTHESIA PROCEDURE NOTES
Spinal Block    Patient location during procedure: OR  Reason for block: procedure for pain and at surgeon's request  Staffing  Performed by: Jessi Tariq CRNA  Authorized by: Abby Acosta MD    Preanesthetic Checklist  Completed: patient identified, IV checked, risks and benefits discussed, surgical consent, monitors and equipment checked, pre-op evaluation and timeout performed  Spinal Block  Patient position: sitting  Prep: ChloraPrep  Patient monitoring: cardiac monitor and frequent blood pressure checks  Approach: midline  Location: L3-4  Injection technique: single-shot  Needle  Needle type: pencil-tip   Needle gauge: 25 G  Needle length: 10 cm  Assessment  Sensory level: T4  Injection Assessment:  negative aspiration for heme, no paresthesia on injection and positive aspiration for clear CSF    Post-procedure:  adhesive bandage applied, pressure dressing applied, secured with tape, site cleaned and sterile dressing applied  Additional Notes  Easy placement

## 2023-06-19 NOTE — ANESTHESIA POSTPROCEDURE EVALUATION
Post-Op Assessment Note    CV Status:  Stable  Pain Score: 0    Pain management: adequate     Mental Status:  Alert and awake   Hydration Status:  Stable   PONV Controlled:  None   Airway Patency:  Patent      Post Op Vitals Reviewed: Yes      Staff: CRNA         No notable events documented  BP   102/59   Temp   97 0   Pulse  71   Resp   16   SpO2   98% on RA   Postop VS in PACU noted above, SV non-obstructed  Unable to move LE yet

## 2023-06-19 NOTE — PLAN OF CARE
Problem: PAIN - ADULT  Goal: Verbalizes/displays adequate comfort level or baseline comfort level  Description: Interventions:  - Encourage patient to monitor pain and request assistance  - Assess pain using appropriate pain scale  - Administer analgesics based on type and severity of pain and evaluate response  - Implement non-pharmacological measures as appropriate and evaluate response  - Consider cultural and social influences on pain and pain management  - Notify physician/advanced practitioner if interventions unsuccessful or patient reports new pain  Outcome: Progressing     Problem: INFECTION - ADULT  Goal: Absence or prevention of progression during hospitalization  Description: INTERVENTIONS:  - Assess and monitor for signs and symptoms of infection  - Monitor lab/diagnostic results  - Monitor all insertion sites, i e  indwelling lines, tubes, and drains  - Monitor endotracheal if appropriate and nasal secretions for changes in amount and color  - [place] appropriate cooling/warming therapies per order  - Administer medications as ordered  - Instruct and encourage patient and family to use good hand hygiene technique  - Identify and instruct in appropriate isolation precautions for identified infection/condition  Outcome: Progressing  Goal: Absence of fever/infection during neutropenic period  Description: INTERVENTIONS:  - Monitor WBC    Outcome: Progressing     Problem: SAFETY ADULT  Goal: Patient will remain free of falls  Description: INTERVENTIONS:  - Educate patient/family on patient safety including physical limitations  - Instruct patient to call for assistance with activity   - Consult OT/PT to assist with strengthening/mobility   - Keep Call bell within reach  - Keep bed low and locked with side rails adjusted as appropriate  - Keep care items and personal belongings within reach  - Initiate and maintain comfort rounds  - Make Fall Risk Sign visible to staff  - Offer Toileting every  Hours, in advance of need  - Initiate/Maintain alarm  - Obtain necessary fall risk management equipment:   - Apply yellow socks and bracelet for high fall risk patients  - Consider moving patient to room near nurses station  Outcome: Progressing  Goal: Maintain or return to baseline ADL function  Description: INTERVENTIONS:  -  Assess patient's ability to carry out ADLs; assess patient's baseline for ADL function and identify physical deficits which impact ability to perform ADLs (bathing, care of mouth/teeth, toileting, grooming, dressing, etc )  - Assess/evaluate cause of self-care deficits   - Assess range of motion  - Assess patient's mobility; develop plan if impaired  - Assess patient's need for assistive devices and provide as appropriate  - Encourage maximum independence but intervene and supervise when necessary  - Involve family in performance of ADLs  - Assess for home care needs following discharge   - Consider OT consult to assist with ADL evaluation and planning for discharge  - Provide patient education as appropriate  Outcome: Progressing  Goal: Maintains/Returns to pre admission functional level  Description: INTERVENTIONS:  - Perform BMAT or MOVE assessment daily    - Set and communicate daily mobility goal to care team and patient/family/caregiver  - Collaborate with rehabilitation services on mobility goals if consulted  - Perform Range of Motion  times a day  - Reposition patient every  hours    - Dangle patient  times a day  - Stand patient  times a day  - Ambulate patient  times a day  - Out of bed to chair  times a day   - Out of bed for meals  times a day  - Out of bed for toileting  - Record patient progress and toleration of activity level   Outcome: Progressing     Problem: Knowledge Deficit  Goal: Patient/family/caregiver demonstrates understanding of disease process, treatment plan, medications, and discharge instructions  Description: Complete learning assessment and assess knowledge base   Interventions:  - Provide teaching at level of understanding  - Provide teaching via preferred learning methods  Outcome: Progressing     Problem: DISCHARGE PLANNING  Goal: Discharge to home or other facility with appropriate resources  Description: INTERVENTIONS:  - Identify barriers to discharge w/patient and caregiver  - Arrange for needed discharge resources and transportation as appropriate  - Identify discharge learning needs (meds, wound care, etc )  - Arrange for interpretive services to assist at discharge as needed  - Refer to Case Management Department for coordinating discharge planning if the patient needs post-hospital services based on physician/advanced practitioner order or complex needs related to functional status, cognitive ability, or social support system  Outcome: Progressing     Problem: POSTPARTUM  Goal: Experiences normal postpartum course  Description: INTERVENTIONS:  - Monitor maternal vital signs  - Assess uterine involution and lochia  Outcome: Progressing  Goal: Appropriate maternal -  bonding  Description: INTERVENTIONS:  - Identify family support  - Assess for appropriate maternal/infant bonding   -Encourage maternal/infant bonding opportunities  - Referral to  or  as needed  Outcome: Progressing  Goal: Establishment of infant feeding pattern  Description: INTERVENTIONS:  - Assess breast/bottle feeding  - Refer to lactation as needed  Outcome: Progressing  Goal: Incision(s), wounds(s) or drain site(s) healing without S/S of infection  Description: INTERVENTIONS  - Assess and document dressing, incision, wound bed, drain sites and surrounding tissue  - Provide patient and family education  - Perform skin care/dressing changes every   Outcome: Progressing

## 2023-06-19 NOTE — PLAN OF CARE
Problem: PAIN - ADULT  Goal: Verbalizes/displays adequate comfort level or baseline comfort level  Description: Interventions:  - Encourage patient to monitor pain and request assistance  - Assess pain using appropriate pain scale  - Administer analgesics based on type and severity of pain and evaluate response  - Implement non-pharmacological measures as appropriate and evaluate response  - Consider cultural and social influences on pain and pain management  - Notify physician/advanced practitioner if interventions unsuccessful or patient reports new pain  Outcome: Progressing     Problem: INFECTION - ADULT  Goal: Absence or prevention of progression during hospitalization  Description: INTERVENTIONS:  - Assess and monitor for signs and symptoms of infection  - Monitor lab/diagnostic results  - Monitor all insertion sites, i e  indwelling lines, tubes, and drains  - Monitor endotracheal if appropriate and nasal secretions for changes in amount and color  - [place] appropriate cooling/warming therapies per order  - Administer medications as ordered  - Instruct and encourage patient and family to use good hand hygiene technique  - Identify and instruct in appropriate isolation precautions for identified infection/condition  Outcome: Progressing  Goal: Absence of fever/infection during neutropenic period  Description: INTERVENTIONS:  - Monitor WBC    Outcome: Progressing     Problem: SAFETY ADULT  Goal: Patient will remain free of falls  Description: INTERVENTIONS:  - Educate patient/family on patient safety including physical limitations  - Instruct patient to call for assistance with activity   - Consult OT/PT to assist with strengthening/mobility   - Keep Call bell within reach  - Keep bed low and locked with side rails adjusted as appropriate  - Keep care items and personal belongings within reach  - Initiate and maintain comfort rounds  - Make Fall Risk Sign visible to staff  - Offer Toileting every  Hours, in advance of need  - Initiate/Maintain alarm  - Obtain necessary fall risk management equipment:   - Apply yellow socks and bracelet for high fall risk patients  - Consider moving patient to room near nurses station  Outcome: Progressing  Goal: Maintain or return to baseline ADL function  Description: INTERVENTIONS:  -  Assess patient's ability to carry out ADLs; assess patient's baseline for ADL function and identify physical deficits which impact ability to perform ADLs (bathing, care of mouth/teeth, toileting, grooming, dressing, etc )  - Assess/evaluate cause of self-care deficits   - Assess range of motion  - Assess patient's mobility; develop plan if impaired  - Assess patient's need for assistive devices and provide as appropriate  - Encourage maximum independence but intervene and supervise when necessary  - Involve family in performance of ADLs  - Assess for home care needs following discharge   - Consider OT consult to assist with ADL evaluation and planning for discharge  - Provide patient education as appropriate  Outcome: Progressing  Goal: Maintains/Returns to pre admission functional level  Description: INTERVENTIONS:  - Perform BMAT or MOVE assessment daily    - Set and communicate daily mobility goal to care team and patient/family/caregiver  - Collaborate with rehabilitation services on mobility goals if consulted  - Perform Range of Motion  times a day  - Reposition patient every hours    - Dangle patient  times a day  - Stand patient  times a day  - Ambulate patient times a day  - Out of bed to chair  times a day   - Out of bed for meals  times a day  - Out of bed for toileting  - Record patient progress and toleration of activity level   Outcome: Progressing     Problem: Knowledge Deficit  Goal: Patient/family/caregiver demonstrates understanding of disease process, treatment plan, medications, and discharge instructions  Description: Complete learning assessment and assess knowledge base   Interventions:  - Provide teaching at level of understanding  - Provide teaching via preferred learning methods  Outcome: Progressing     Problem: DISCHARGE PLANNING  Goal: Discharge to home or other facility with appropriate resources  Description: INTERVENTIONS:  - Identify barriers to discharge w/patient and caregiver  - Arrange for needed discharge resources and transportation as appropriate  - Identify discharge learning needs (meds, wound care, etc )  - Arrange for interpretive services to assist at discharge as needed  - Refer to Case Management Department for coordinating discharge planning if the patient needs post-hospital services based on physician/advanced practitioner order or complex needs related to functional status, cognitive ability, or social support system  Outcome: Progressing     Problem: POSTPARTUM  Goal: Experiences normal postpartum course  Description: INTERVENTIONS:  - Monitor maternal vital signs  - Assess uterine involution and lochia  Outcome: Progressing  Goal: Appropriate maternal -  bonding  Description: INTERVENTIONS:  - Identify family support  - Assess for appropriate maternal/infant bonding   -Encourage maternal/infant bonding opportunities  - Referral to  or  as needed  Outcome: Progressing  Goal: Establishment of infant feeding pattern  Description: INTERVENTIONS:  - Assess breast/bottle feeding  - Refer to lactation as needed  Outcome: Progressing  Goal: Incision(s), wounds(s) or drain site(s) healing without S/S of infection  Description: INTERVENTIONS  - Assess and document dressing, incision, wound bed, drain sites and surrounding tissue  - Provide patient and family education  - Perform skin care/dressing changes every   Outcome: Progressing     Problem: ALTERATION IN THE BREAST  Goal: Optimize infant feeding at the breast  Description: INTERVENTIONS:  - Latch, breast and nipple assessment  - Assess prior breast feeding history  - Hand expression of breast milk  - For cracked, bleeding and or sore nipples reassess latch, treat damaged nipple  -Educate mother on feeding cues  -Positioning/latch techniques  Outcome: Progressing     Problem: INADEQUATE LATCH, SUCK OR SWALLOW  Goal: Demonstrate ability to latch and sustain latch, audible swallowing and satiety  Description: INTERVENTIONS:  - Assess oral anatomy, notify Physician/AP for abnormal findings  - Establish milk expression  - Maximize feeding opportunity (skin to skin, behavioral state)  - Position/latch techniques  - Discourage use of pacifier-artificial nipple  - Mechanical pumping  - Nipple Shield  - Supplemental formula feeding (Physician/AP order)  - Alternative feeding method  Outcome: Progressing

## 2023-06-19 NOTE — DISCHARGE SUMMARY
CS Discharge Summary - Adore Burrows 30 y o  female MRN: [id number]    Unit/Bed#: LD PACU- Encounter: [id number]    Admission Date: 2023     Discharge Date: 23    Admitting Diagnosis:   1  Pregnancy at 39 weeks of gestation   2  History of prior  section   3  Request for permeant sterilization   4  Elevated BMI in pregnancy   5  AMA   6  Polyhydramnios in pregnancy     Discharge Diagnosis:   Same, delivered    Procedures:   repeat  section, low transverse incision  Bilateral salpingectomy    Discharge Attending: Dr Alfredo Sullivan service: none  Consult attending: none    Hospital Course:     Adore Burrows is a 30 y o  [id number] who was admitted at 39w4d for RLTCS + BS  She then underwent an uncomplicated  section delivery and delivered a viable female  at 36  APGARS were 8, 9 at 1 and 5 minutes, respectively   weighed 8lb 3 6oz  Placenta was delivered at 1126   was then transferred to  nursery  Patient tolerated the procedure well and was transferred to recovery in stable condition  The patient's post partum course was unremarkable    Preoperative hemaglobin was 11 9, postoperative was 10 1  Her postoperative pain was well controlled with oral analgesics  On day of discharge, she was ambulating and able to reasonably perform all ADLs  She was voiding and had appropriate bowel function  Pain was well controlled  She was discharged home on post-operative day #2 without complications  Patient was instructed to follow up with her OB as an outpatient and was given appropriate warnings to call provider if she develops signs of infection or uncontrolled pain  Complications:   None    Condition at discharge:   good     Provisions for Follow-Up Care:  See after visit summary for information related to follow-up care and any pertinent home health orders        Disposition:   Home    Planned Readmission:   No    Discharge Medications:   Prenatal vitamin daily for 6 months or the duration of nursing whichever is longer  Motrin 600 mg orally every 6 hours as needed for pain  Tylenol (over the counter) per bottle directions as needed for pain  Hydrocortisone cream 1% (over the counter) applied 1-2x daily to hemorrhoids as needed  Witch hazel pads for hemorrhoidal discomfort as needed      Discharge instructions :   -Do not place anything (no partner, tampons or douche) in your vagina for 6 weeks  -You may walk for exercise for the first 6 weeks then gradually return to your usual activities    -Please do not drive for 1 week if you have no stitches and for 2 weeks if you have stitches or underwent a  delivery     -You may take baths or shower per your preference    -Please look at your bust (breasts) in the mirror daily and call provider for redness or tenderness or increased warmth  - If you have had a  please look at your incision daily as well and call provider for increasing redness or steady drainage from the incision    -Please call your provider if temperature > 100 4*F or 38* C, worsening pain or a foul discharge      Margoth Freitas  OBGYN PGY1

## 2023-06-20 PROBLEM — Z90.79 HISTORY OF BILATERAL SALPINGECTOMY: Status: ACTIVE | Noted: 2023-06-20

## 2023-06-20 LAB
BASOPHILS # BLD AUTO: 0.03 THOUSANDS/ÂΜL (ref 0–0.1)
BASOPHILS NFR BLD AUTO: 0 % (ref 0–1)
EOSINOPHIL # BLD AUTO: 0.08 THOUSAND/ÂΜL (ref 0–0.61)
EOSINOPHIL NFR BLD AUTO: 1 % (ref 0–6)
ERYTHROCYTE [DISTWIDTH] IN BLOOD BY AUTOMATED COUNT: 13.6 % (ref 11.6–15.1)
HCT VFR BLD AUTO: 31 % (ref 34.8–46.1)
HGB BLD-MCNC: 10.1 G/DL (ref 11.5–15.4)
IMM GRANULOCYTES # BLD AUTO: 0.02 THOUSAND/UL (ref 0–0.2)
IMM GRANULOCYTES NFR BLD AUTO: 0 % (ref 0–2)
LYMPHOCYTES # BLD AUTO: 2.48 THOUSANDS/ÂΜL (ref 0.6–4.47)
LYMPHOCYTES NFR BLD AUTO: 36 % (ref 14–44)
MCH RBC QN AUTO: 28.1 PG (ref 26.8–34.3)
MCHC RBC AUTO-ENTMCNC: 32.6 G/DL (ref 31.4–37.4)
MCV RBC AUTO: 86 FL (ref 82–98)
MONOCYTES # BLD AUTO: 0.36 THOUSAND/ÂΜL (ref 0.17–1.22)
MONOCYTES NFR BLD AUTO: 5 % (ref 4–12)
NEUTROPHILS # BLD AUTO: 3.9 THOUSANDS/ÂΜL (ref 1.85–7.62)
NEUTS SEG NFR BLD AUTO: 58 % (ref 43–75)
NRBC BLD AUTO-RTO: 0 /100 WBCS
PLATELET # BLD AUTO: 239 THOUSANDS/UL (ref 149–390)
PMV BLD AUTO: 10.1 FL (ref 8.9–12.7)
RBC # BLD AUTO: 3.6 MILLION/UL (ref 3.81–5.12)
WBC # BLD AUTO: 6.87 THOUSAND/UL (ref 4.31–10.16)

## 2023-06-20 PROCEDURE — 85025 COMPLETE CBC W/AUTO DIFF WBC: CPT

## 2023-06-20 PROCEDURE — 99024 POSTOP FOLLOW-UP VISIT: CPT | Performed by: OBSTETRICS & GYNECOLOGY

## 2023-06-20 RX ORDER — IBUPROFEN 600 MG/1
600 TABLET ORAL EVERY 6 HOURS PRN
Status: DISCONTINUED | OUTPATIENT
Start: 2023-06-21 | End: 2023-06-21 | Stop reason: HOSPADM

## 2023-06-20 RX ADMIN — KETOROLAC TROMETHAMINE 30 MG: 30 INJECTION, SOLUTION INTRAMUSCULAR at 18:04

## 2023-06-20 RX ADMIN — KETOROLAC TROMETHAMINE 30 MG: 30 INJECTION, SOLUTION INTRAMUSCULAR at 13:13

## 2023-06-20 RX ADMIN — DOCUSATE SODIUM 100 MG: 100 CAPSULE, LIQUID FILLED ORAL at 18:04

## 2023-06-20 RX ADMIN — ACETAMINOPHEN 650 MG: 325 TABLET, FILM COATED ORAL at 06:32

## 2023-06-20 RX ADMIN — ACETAMINOPHEN 650 MG: 325 TABLET, FILM COATED ORAL at 18:04

## 2023-06-20 RX ADMIN — KETOROLAC TROMETHAMINE 30 MG: 30 INJECTION, SOLUTION INTRAMUSCULAR at 00:17

## 2023-06-20 RX ADMIN — KETOROLAC TROMETHAMINE 30 MG: 30 INJECTION, SOLUTION INTRAMUSCULAR at 06:32

## 2023-06-20 RX ADMIN — ACETAMINOPHEN 650 MG: 325 TABLET, FILM COATED ORAL at 00:17

## 2023-06-20 RX ADMIN — ACETAMINOPHEN 650 MG: 325 TABLET, FILM COATED ORAL at 13:12

## 2023-06-20 RX ADMIN — DOCUSATE SODIUM 100 MG: 100 CAPSULE, LIQUID FILLED ORAL at 08:57

## 2023-06-20 NOTE — PLAN OF CARE
Problem: PAIN - ADULT  Goal: Verbalizes/displays adequate comfort level or baseline comfort level  Description: Interventions:  - Encourage patient to monitor pain and request assistance  - Assess pain using appropriate pain scale  - Administer analgesics based on type and severity of pain and evaluate response  - Implement non-pharmacological measures as appropriate and evaluate response  - Consider cultural and social influences on pain and pain management  - Notify physician/advanced practitioner if interventions unsuccessful or patient reports new pain  Outcome: Progressing     Problem: INFECTION - ADULT  Goal: Absence or prevention of progression during hospitalization  Description: INTERVENTIONS:  - Assess and monitor for signs and symptoms of infection  - Monitor lab/diagnostic results  - Monitor all insertion sites, i e  indwelling lines, tubes, and drains  - Monitor endotracheal if appropriate and nasal secretions for changes in amount and color  - [place] appropriate cooling/warming therapies per order  - Administer medications as ordered  - Instruct and encourage patient and family to use good hand hygiene technique  - Identify and instruct in appropriate isolation precautions for identified infection/condition  Outcome: Progressing  Goal: Absence of fever/infection during neutropenic period  Description: INTERVENTIONS:  - Monitor WBC    Outcome: Progressing     Problem: SAFETY ADULT  Goal: Patient will remain free of falls  Description: INTERVENTIONS:  - Educate patient/family on patient safety including physical limitations  - Instruct patient to call for assistance with activity   - Consult OT/PT to assist with strengthening/mobility   - Keep Call bell within reach  - Keep bed low and locked with side rails adjusted as appropriate  - Keep care items and personal belongings within reach  - Initiate and maintain comfort rounds  - Make Fall Risk Sign visible to staff  - Apply yellow socks and bracelet for high fall risk patients  - Consider moving patient to room near nurses station  Outcome: Progressing  Goal: Maintain or return to baseline ADL function  Description: INTERVENTIONS:  -  Assess patient's ability to carry out ADLs; assess patient's baseline for ADL function and identify physical deficits which impact ability to perform ADLs (bathing, care of mouth/teeth, toileting, grooming, dressing, etc )  - Assess/evaluate cause of self-care deficits   - Assess range of motion  - Assess patient's mobility; develop plan if impaired  - Assess patient's need for assistive devices and provide as appropriate  - Encourage maximum independence but intervene and supervise when necessary  - Involve family in performance of ADLs  - Assess for home care needs following discharge   - Consider OT consult to assist with ADL evaluation and planning for discharge  - Provide patient education as appropriate  Outcome: Progressing  Goal: Maintains/Returns to pre admission functional level  Description: INTERVENTIONS:  - Perform BMAT or MOVE assessment daily    - Set and communicate daily mobility goal to care team and patient/family/caregiver  - Collaborate with rehabilitation services on mobility goals if consulted  - Out of bed for toileting  - Record patient progress and toleration of activity level   Outcome: Progressing     Problem: Knowledge Deficit  Goal: Patient/family/caregiver demonstrates understanding of disease process, treatment plan, medications, and discharge instructions  Description: Complete learning assessment and assess knowledge base    Interventions:  - Provide teaching at level of understanding  - Provide teaching via preferred learning methods  Outcome: Progressing     Problem: DISCHARGE PLANNING  Goal: Discharge to home or other facility with appropriate resources  Description: INTERVENTIONS:  - Identify barriers to discharge w/patient and caregiver  - Arrange for needed discharge resources and transportation as appropriate  - Identify discharge learning needs (meds, wound care, etc )  - Arrange for interpretive services to assist at discharge as needed  - Refer to Case Management Department for coordinating discharge planning if the patient needs post-hospital services based on physician/advanced practitioner order or complex needs related to functional status, cognitive ability, or social support system  Outcome: Progressing     Problem: POSTPARTUM  Goal: Experiences normal postpartum course  Description: INTERVENTIONS:  - Monitor maternal vital signs  - Assess uterine involution and lochia  Outcome: Progressing  Goal: Appropriate maternal -  bonding  Description: INTERVENTIONS:  - Identify family support  - Assess for appropriate maternal/infant bonding   -Encourage maternal/infant bonding opportunities  - Referral to  or  as needed  Outcome: Progressing  Goal: Establishment of infant feeding pattern  Description: INTERVENTIONS:  - Assess breast/bottle feeding  - Refer to lactation as needed  Outcome: Progressing  Goal: Incision(s), wounds(s) or drain site(s) healing without S/S of infection  Description: INTERVENTIONS  - Assess and document dressing, incision, wound bed, drain sites and surrounding tissue  - Provide patient and family education  Outcome: Progressing     Problem: ALTERATION IN THE BREAST  Goal: Optimize infant feeding at the breast  Description: INTERVENTIONS:  - Latch, breast and nipple assessment  - Assess prior breast feeding history  - Hand expression of breast milk  - For cracked, bleeding and or sore nipples reassess latch, treat damaged nipple  -Educate mother on feeding cues  -Positioning/latch techniques  Outcome: Progressing     Problem: INADEQUATE LATCH, SUCK OR SWALLOW  Goal: Demonstrate ability to latch and sustain latch, audible swallowing and satiety  Description: INTERVENTIONS:  - Assess oral anatomy, notify Physician/AP for abnormal findings  - Establish milk expression  - Maximize feeding opportunity (skin to skin, behavioral state)  - Position/latch techniques  - Discourage use of pacifier-artificial nipple  - Mechanical pumping  - Nipple Shield  - Supplemental formula feeding (Physician/AP order)  - Alternative feeding method  Outcome: Progressing

## 2023-06-20 NOTE — LACTATION NOTE
This note was copied from a baby's chart  Follow up Lactation: mom states her first child had a posterior tongue tie and [facility]  Mom states once TT was resolved, baby latched and fed at the breast for over +1  Mom states latch began to hurt overnight  Mom states it feels like baby is shallow at the breast  Mom states she used her personal Medela pump to express colostrum and feed via syringe during a night feeding  Mom wants help with latching and assessment  Upon oral assessment, baby presents with pursed lips and tight buccal/labia muscles  Demonstration and teach back of infant oral massage at upper/lower mandibles  Slight recessed chin  Tongue is elevated in the mouth; palate is WNL; bilateral laterization; extension over lower alveolar ridge, no elevation noted with exam  Upon digital exam, suck reflex with cupping of digit noted  Snap-back after 3-5 suck sequence, then resumes cupping  With lower mandible support, better peristalic movement  Mom brought baby up to the left breast in cradle hold  Ed  On latching in cross cradle and moving into cradle to support the wrist  Ed  On chin deep into the breast tissue  Wider latch with alignment, positioning support, and education  Demonstration of flanging upper and lower lips  Mom denies any pain  Active coordinated sucking  Baby demonstrates peristalic movement until mom's support relaxes  Baby then appears chompy on the breast  Ed  To parents to provide support to lower mandible in cross cradle and support to mom's arms to support the baby  Breast compressions demonstrated with teach back  FOWALLACE has already called [facility] for [facility]t  Enc  Both breasts and to call lactation when inpatient for assistance  Education on positioning and alignment   Mom is encouraged to:     - Bring baby up to the breast (use of pillows to elevate so baby's torso is against mom's breasts)   - Skin to skin for feedings with top hand exposed to show signs of satiation   - Chin deep into breast tissue (make baby look up to the nipple)   - nose aligned to the nipple   -Wait for wide gape, drag chin on the breast so nipple is aimed at the upper, back palate  - Cheek should be touching breast   - Deep, firm hold of baby with ear, shoulder, hip alignment    Provided demonstration, education and support of deep latch to breast by placing the nipple to the nose, dragging down to chin to achieve a wide latch  Bring baby to the breast, not breast to baby  Move your shoulders down and away from your ears  Look for ear, shoulder, hip alignment  Baby's upper and lower lip should be flanged on the breast     Demonstrated with teach back breast compressions during a feeding to increase milk transfer and stimulate suckling after a breathing/muscle break  - Start feedings on breast that last feeding ended   - allow no more than 3 hours between breast feeding sessions   - time between feedings is counted from the beginning of the first feed to the beginning of the next feeding session    Reviewed early signs of hunger, including tensing of hands and shoulders - no need to wait for open eyes  Crying is a late hunger sign  If baby is crying, soothe baby first and then attempt to latch  Reviewed normal sucking patterns: transition from stimulation to nutritive to release or non-nutritive  The goal is to see and hear lots of swallowing  Reviewed normal nursing pattern: infant could latch on one breast up to 30 minutes or until releases on own  Signs of satiation is open hand with fingers that do not grab your finger    Discussed difference in sensation of non-nutritive v nutritive sucking    Nurse on demand: when baby gives hunger cues; when your breasts feel full, or at least every 3 hours during the day and every 5 hours at night counting from the beginning of one feeding to the beginning of the next; which ever comes first  When sucking and "swallowing slow, gently compress the breast to restart flow  If active suck-swallow does not restart, gently remove the baby and offer the other breast; offering up to \"four\" breasts per feeding  Assistance with flanging baby's lips on the breast  Demonstration and teach-back of tugging on chin to flange lower lip and pressing digit into the breast, rolling digit under the upper lip and away from the oral cavity to flange upper lip while baby is latched  Education on how to achieve flanged lips with initial latch and positioning to the breast  Encouraged to un-latch and re-latch baby if mom has pain, discomfort, or if latch appears shallow  Encouraged to call lactation for assistance  To assist with deep latch to the breast with a visible recessed chin,have baby's chin pressed deeply into the breast tissue  Use compression with hand between the shoulder blades to aid in active, coordinated jaw movement  Encouraged parents to call for assistance, questions, and concerns about breastfeeding  Extension provided      "

## 2023-06-20 NOTE — PLAN OF CARE
Problem: PAIN - ADULT  Goal: Verbalizes/displays adequate comfort level or baseline comfort level  Description: Interventions:  - Encourage patient to monitor pain and request assistance  - Assess pain using appropriate pain scale  - Administer analgesics based on type and severity of pain and evaluate response  - Implement non-pharmacological measures as appropriate and evaluate response  - Consider cultural and social influences on pain and pain management  - Notify physician/advanced practitioner if interventions unsuccessful or patient reports new pain  Outcome: Progressing     Problem: INFECTION - ADULT  Goal: Absence or prevention of progression during hospitalization  Description: INTERVENTIONS:  - Assess and monitor for signs and symptoms of infection  - Monitor lab/diagnostic results  - Monitor all insertion sites, i e  indwelling lines, tubes, and drains  - Monitor endotracheal if appropriate and nasal secretions for changes in amount and color  - [place] appropriate cooling/warming therapies per order  - Administer medications as ordered  - Instruct and encourage patient and family to use good hand hygiene technique  - Identify and instruct in appropriate isolation precautions for identified infection/condition  Outcome: Progressing  Goal: Absence of fever/infection during neutropenic period  Description: INTERVENTIONS:  - Monitor WBC    Outcome: Progressing     Problem: SAFETY ADULT  Goal: Patient will remain free of falls  Description: INTERVENTIONS:  - Educate patient/family on patient safety including physical limitations  - Instruct patient to call for assistance with activity   - Consult OT/PT to assist with strengthening/mobility   - Keep Call bell within reach  - Keep bed low and locked with side rails adjusted as appropriate  - Keep care items and personal belongings within reach  - Initiate and maintain comfort rounds  - Make Fall Risk Sign visible to staff  - Offer Toileting every  Hours, in advance of need  - Initiate/Maintain alarm  - Obtain necessary fall risk management equipment:   - Apply yellow socks and bracelet for high fall risk patients  - Consider moving patient to room near nurses station  Outcome: Progressing  Goal: Maintain or return to baseline ADL function  Description: INTERVENTIONS:  -  Assess patient's ability to carry out ADLs; assess patient's baseline for ADL function and identify physical deficits which impact ability to perform ADLs (bathing, care of mouth/teeth, toileting, grooming, dressing, etc )  - Assess/evaluate cause of self-care deficits   - Assess range of motion  - Assess patient's mobility; develop plan if impaired  - Assess patient's need for assistive devices and provide as appropriate  - Encourage maximum independence but intervene and supervise when necessary  - Involve family in performance of ADLs  - Assess for home care needs following discharge   - Consider OT consult to assist with ADL evaluation and planning for discharge  - Provide patient education as appropriate  Outcome: Progressing  Goal: Maintains/Returns to pre admission functional level  Description: INTERVENTIONS:  - Perform BMAT or MOVE assessment daily    - Set and communicate daily mobility goal to care team and patient/family/caregiver  - Collaborate with rehabilitation services on mobility goals if consulted  - Perform Range of Motion  times a day  - Reposition patient every hours    - Dangle patient  times a day  - Stand patient  times a day  - Ambulate patient times a day  - Out of bed to chair  times a day   - Out of bed for meals  times a day  - Out of bed for toileting  - Record patient progress and toleration of activity level   Outcome: Progressing     Problem: Knowledge Deficit  Goal: Patient/family/caregiver demonstrates understanding of disease process, treatment plan, medications, and discharge instructions  Description: Complete learning assessment and assess knowledge base   Interventions:  - Provide teaching at level of understanding  - Provide teaching via preferred learning methods  Outcome: Progressing     Problem: DISCHARGE PLANNING  Goal: Discharge to home or other facility with appropriate resources  Description: INTERVENTIONS:  - Identify barriers to discharge w/patient and caregiver  - Arrange for needed discharge resources and transportation as appropriate  - Identify discharge learning needs (meds, wound care, etc )  - Arrange for interpretive services to assist at discharge as needed  - Refer to Case Management Department for coordinating discharge planning if the patient needs post-hospital services based on physician/advanced practitioner order or complex needs related to functional status, cognitive ability, or social support system  Outcome: Progressing     Problem: POSTPARTUM  Goal: Experiences normal postpartum course  Description: INTERVENTIONS:  - Monitor maternal vital signs  - Assess uterine involution and lochia  Outcome: Progressing  Goal: Appropriate maternal -  bonding  Description: INTERVENTIONS:  - Identify family support  - Assess for appropriate maternal/infant bonding   -Encourage maternal/infant bonding opportunities  - Referral to  or  as needed  Outcome: Progressing  Goal: Establishment of infant feeding pattern  Description: INTERVENTIONS:  - Assess breast/bottle feeding  - Refer to lactation as needed  Outcome: Progressing  Goal: Incision(s), wounds(s) or drain site(s) healing without S/S of infection  Description: INTERVENTIONS  - Assess and document dressing, incision, wound bed, drain sites and surrounding tissue  - Provide patient and family education  - Perform skin care/dressing changes every   Outcome: Progressing     Problem: ALTERATION IN THE BREAST  Goal: Optimize infant feeding at the breast  Description: INTERVENTIONS:  - Latch, breast and nipple assessment  - Assess prior breast feeding history  - Hand expression of breast milk  - For cracked, bleeding and or sore nipples reassess latch, treat damaged nipple  -Educate mother on feeding cues  -Positioning/latch techniques  Outcome: Progressing     Problem: INADEQUATE LATCH, SUCK OR SWALLOW  Goal: Demonstrate ability to latch and sustain latch, audible swallowing and satiety  Description: INTERVENTIONS:  - Assess oral anatomy, notify Physician/AP for abnormal findings  - Establish milk expression  - Maximize feeding opportunity (skin to skin, behavioral state)  - Position/latch techniques  - Discourage use of pacifier-artificial nipple  - Mechanical pumping  - Nipple Shield  - Supplemental formula feeding (Physician/AP order)  - Alternative feeding method  Outcome: Progressing

## 2023-06-20 NOTE — PROGRESS NOTES
"Progress Note - OB/GYN  Liu Porter 30 y o  female MRN: [id number]  Unit/Bed#:  316-01 Encounter: [id number]    Assessment and Plan     Liu Porter is a patient of: [facility]  She is POD# 1 s/p RLTCS and BS  Recovering well and is stable       * Status post repeat low transverse  section  Assessment & Plan  , Hgb 11 9 --> 10 1  Voiding spontaneously  Pain: Tylenol and toradol scheduled, [facility] 5/10 PRN    FEN: Tolerating regular diet  DVT ppx: SCDs and Lovenox 40mg BID  Passing flatus   Incision C/D/I           Disposition    - Anticipate discharge home on POD# 2vs3      Subjective/Objective     Chief Complaint: Postoperative State     Subjective:    Liu Porter is s/p RLTCS  She is POD# 1  She has no current complaints  Pain is well controlled  Patient is currently voiding  She is ambulating  Patient is currently passing flatus and has had no bowel movement  She is tolerating PO, and denies nausea or vomitting  Patient denies fever, chills, chest pain, shortness of breath, or calf tenderness  Lochia is normal  She is  Breastfeeding  Her incision is C/D/I  She is recovering well and is stable         Vitals:   /50   Pulse 83   Temp 98 1 °F (36 7 °C) (Oral)   Resp 18   Ht 5' 6\" (1 676 m)   Wt 131 kg (289 lb)   LMP 09/15/2022   SpO2 98%   Breastfeeding Yes   BMI 46 65 kg/m²       Intake/Output Summary (Last 24 hours) at 2023 0645  Last data filed at 2023 0600  Gross per 24 hour   Intake 3050 ml   Output 1141 ml   Net 1909 ml       Invasive Devices     Peripheral Intravenous Line  Duration           Peripheral IV 23 Left;Ventral (anterior) Forearm <1 day                Physical Exam:   GEN: Liu Porter appears well, alert, pleasant and cooperative   CARDIO: RRR  RESP:  Normal respiratory effort  ABDOMEN: soft, no tenderness, no distention, fundus firm, Incision C/D/I  EXTREMITIES: SCDs on, Negative Rajendra's sign " bilaterally      Labs:     Hemoglobin   Date Value Ref Range Status   06/20/2023 10 1 (L) 11 5 - 15 4 g/dL Final   06/19/2023 11 9 11 5 - 15 4 g/dL Final     WBC   Date Value Ref Range Status   06/20/2023 6 87 4 31 - 10 16 Thousand/uL Final   06/19/2023 6 80 4 31 - 10 16 Thousand/uL Final     Platelets   Date Value Ref Range Status   06/20/2023 239 149 - 390 Thousands/uL Final   06/19/2023 239 149 - 390 Thousands/uL Final     Creatinine   Date Value Ref Range Status   06/05/2023 0 53 (L) 0 60 - 1 30 mg/dL Final     Comment:     Standardized to IDMS reference method   01/28/2021 0 52 (L) 0 60 - 1 30 mg/dL Final     Comment:     Standardized to IDMS reference method     AST   Date Value Ref Range Status   06/05/2023 15 13 - 39 U/L Final   01/28/2021 16 5 - 45 U/L Final     Comment:     Specimen collection should occur prior to Sulfasalazine administration due to the potential for falsely depressed results  ALT   Date Value Ref Range Status   06/05/2023 15 7 - 52 U/L Final     Comment:     Specimen collection should occur prior to Sulfasalazine administration due to the potential for falsely depressed results  01/28/2021 34 12 - 78 U/L Final     Comment:     Specimen collection should occur prior to Sulfasalazine administration due to the potential for falsely depressed results             MD MISSY CoyGYN PGY1  6/20/2023  6:45 AM

## 2023-06-21 VITALS
HEIGHT: 66 IN | OXYGEN SATURATION: 96 % | RESPIRATION RATE: 17 BRPM | WEIGHT: 289 LBS | DIASTOLIC BLOOD PRESSURE: 72 MMHG | TEMPERATURE: 98.4 F | BODY MASS INDEX: 46.45 KG/M2 | SYSTOLIC BLOOD PRESSURE: 117 MMHG | HEART RATE: 66 BPM

## 2023-06-21 PROCEDURE — 99024 POSTOP FOLLOW-UP VISIT: CPT | Performed by: OBSTETRICS & GYNECOLOGY

## 2023-06-21 PROCEDURE — NC001 PR NO CHARGE: Performed by: OBSTETRICS & GYNECOLOGY

## 2023-06-21 RX ORDER — DOCUSATE SODIUM 100 MG/1
100 CAPSULE, LIQUID FILLED ORAL 2 TIMES DAILY
Refills: 0
Start: 2023-06-21

## 2023-06-21 RX ORDER — IBUPROFEN 600 MG/1
600 TABLET ORAL EVERY 6 HOURS PRN
Qty: 30 TABLET | Refills: 0
Start: 2023-06-21

## 2023-06-21 RX ORDER — OXYCODONE HYDROCHLORIDE 5 MG/1
5 TABLET ORAL EVERY 6 HOURS PRN
Qty: 5 TABLET | Refills: 0 | Status: SHIPPED | OUTPATIENT
Start: 2023-06-21

## 2023-06-21 RX ADMIN — IBUPROFEN 600 MG: 600 TABLET ORAL at 09:36

## 2023-06-21 RX ADMIN — IBUPROFEN 600 MG: 600 TABLET ORAL at 04:14

## 2023-06-21 RX ADMIN — ACETAMINOPHEN 650 MG: 325 TABLET, FILM COATED ORAL at 06:15

## 2023-06-21 RX ADMIN — ACETAMINOPHEN 650 MG: 325 TABLET, FILM COATED ORAL at 00:06

## 2023-06-21 RX ADMIN — DOCUSATE SODIUM 100 MG: 100 CAPSULE, LIQUID FILLED ORAL at 09:36

## 2023-06-21 NOTE — PLAN OF CARE
Problem: PAIN - ADULT  Goal: Verbalizes/displays adequate comfort level or baseline comfort level  Description: Interventions:  - Encourage patient to monitor pain and request assistance  - Assess pain using appropriate pain scale  - Administer analgesics based on type and severity of pain and evaluate response  - Implement non-pharmacological measures as appropriate and evaluate response  - Consider cultural and social influences on pain and pain management  - Notify physician/advanced practitioner if interventions unsuccessful or patient reports new pain  6/21/2023 1140 by Waldo Cantrell RN  Outcome: Completed  6/21/2023 1058 by Waldo Cantrell RN  Outcome: Progressing     Problem: INFECTION - ADULT  Goal: Absence or prevention of progression during hospitalization  Description: INTERVENTIONS:  - Assess and monitor for signs and symptoms of infection  - Monitor lab/diagnostic results  - Monitor all insertion sites, i e  indwelling lines, tubes, and drains  - Monitor endotracheal if appropriate and nasal secretions for changes in amount and color  - [place] appropriate cooling/warming therapies per order  - Administer medications as ordered  - Instruct and encourage patient and family to use good hand hygiene technique  - Identify and instruct in appropriate isolation precautions for identified infection/condition  6/21/2023 1140 by Waldo Cantrell RN  Outcome: Completed  6/21/2023 1058 by Waldo Cantrell RN  Outcome: Progressing  Goal: Absence of fever/infection during neutropenic period  Description: INTERVENTIONS:  - Monitor WBC    6/21/2023 1140 by Waldo Cantrell RN  Outcome: Completed  6/21/2023 1058 by Waldo Cantrell RN  Outcome: Progressing     Problem: SAFETY ADULT  Goal: Patient will remain free of falls  Description: INTERVENTIONS:  - Educate patient/family on patient safety including physical limitations  - Instruct patient to call for assistance with activity   - Consult OT/PT to assist with strengthening/mobility   - Keep Call bell within reach  - Keep bed low and locked with side rails adjusted as appropriate  - Keep care items and personal belongings within reach  - Initiate and maintain comfort rounds  - Make Fall Risk Sign visible to staff  - Apply yellow socks and bracelet for high fall risk patients  - Consider moving patient to room near nurses station  6/21/2023 1140 by Rolly Camacho RN  Outcome: Completed  6/21/2023 1058 by Rolly Camacho RN  Outcome: Progressing  Goal: Maintain or return to baseline ADL function  Description: INTERVENTIONS:  -  Assess patient's ability to carry out ADLs; assess patient's baseline for ADL function and identify physical deficits which impact ability to perform ADLs (bathing, care of mouth/teeth, toileting, grooming, dressing, etc )  - Assess/evaluate cause of self-care deficits   - Assess range of motion  - Assess patient's mobility; develop plan if impaired  - Assess patient's need for assistive devices and provide as appropriate  - Encourage maximum independence but intervene and supervise when necessary  - Involve family in performance of ADLs  - Assess for home care needs following discharge   - Consider OT consult to assist with ADL evaluation and planning for discharge  - Provide patient education as appropriate  6/21/2023 1140 by Rolly Camacho RN  Outcome: Completed  6/21/2023 1058 by Rolly Camacho RN  Outcome: Progressing  Goal: Maintains/Returns to pre admission functional level  Description: INTERVENTIONS:  - Perform BMAT or MOVE assessment daily    - Set and communicate daily mobility goal to care team and patient/family/caregiver     - Collaborate with rehabilitation services on mobility goals if consulted  - Out of bed for toileting  - Record patient progress and toleration of activity level   6/21/2023 1140 by Rolly Camacho RN  Outcome: Completed  6/21/2023 1058 by Rolly Camacho RN  Outcome: Progressing     Problem: Knowledge Deficit  Goal: Patient/family/caregiver demonstrates understanding of disease process, treatment plan, medications, and discharge instructions  Description: Complete learning assessment and assess knowledge base    Interventions:  - Provide teaching at level of understanding  - Provide teaching via preferred learning methods  2023 1140 by Cliff Portillo RN  Outcome: Completed  2023 1058 by Cliff Portillo RN  Outcome: Progressing     Problem: DISCHARGE PLANNING  Goal: Discharge to home or other facility with appropriate resources  Description: INTERVENTIONS:  - Identify barriers to discharge w/patient and caregiver  - Arrange for needed discharge resources and transportation as appropriate  - Identify discharge learning needs (meds, wound care, etc )  - Arrange for interpretive services to assist at discharge as needed  - Refer to Case Management Department for coordinating discharge planning if the patient needs post-hospital services based on physician/advanced practitioner order or complex needs related to functional status, cognitive ability, or social support system  2023 1140 by Cliff Portillo RN  Outcome: Completed  2023 1058 by Cliff Portillo RN  Outcome: Progressing     Problem: POSTPARTUM  Goal: Experiences normal postpartum course  Description: INTERVENTIONS:  - Monitor maternal vital signs  - Assess uterine involution and lochia  2023 1140 by Cliff Portillo RN  Outcome: Completed  2023 1058 by Cliff Portillo RN  Outcome: Progressing  Goal: Appropriate maternal -  bonding  Description: INTERVENTIONS:  - Identify family support  - Assess for appropriate maternal/infant bonding   -Encourage maternal/infant bonding opportunities  - Referral to  or  as needed  2023 1140 by Cliff Portillo RN  Outcome: Completed  2023 1058 by Cliff Portillo RN  Outcome: Progressing  Goal: Establishment of infant feeding pattern  Description: INTERVENTIONS:  - Assess breast/bottle feeding  - Refer to lactation as needed  6/21/2023 1140 by Alison Goins RN  Outcome: Completed  6/21/2023 1058 by Alison Goins RN  Outcome: Progressing  Goal: Incision(s), wounds(s) or drain site(s) healing without S/S of infection  Description: INTERVENTIONS  - Assess and document dressing, incision, wound bed, drain sites and surrounding tissue  - Provide patient and family education  6/21/2023 1140 by Alison Goins RN  Outcome: Completed  6/21/2023 1058 by Alison Goins RN  Outcome: Progressing     Problem: ALTERATION IN THE BREAST  Goal: Optimize infant feeding at the breast  Description: INTERVENTIONS:  - Latch, breast and nipple assessment  - Assess prior breast feeding history  - Hand expression of breast milk  - For cracked, bleeding and or sore nipples reassess latch, treat damaged nipple  -Educate mother on feeding cues  -Positioning/latch techniques  6/21/2023 1140 by Alison Goins RN  Outcome: Completed  6/21/2023 1058 by Alison Goins RN  Outcome: Progressing     Problem: INADEQUATE LATCH, SUCK OR SWALLOW  Goal: Demonstrate ability to latch and sustain latch, audible swallowing and satiety  Description: INTERVENTIONS:  - Assess oral anatomy, notify Physician/AP for abnormal findings  - Establish milk expression  - Maximize feeding opportunity (skin to skin, behavioral state)  - Position/latch techniques  - Discourage use of pacifier-artificial nipple  - Mechanical pumping  - Nipple Shield  - Supplemental formula feeding (Physician/AP order)  - Alternative feeding method  6/21/2023 1140 by Alison Goins RN  Outcome: Completed  6/21/2023 1058 by Alison Goins RN  Outcome: Progressing

## 2023-06-21 NOTE — PLAN OF CARE
Problem: PAIN - ADULT  Goal: Verbalizes/displays adequate comfort level or baseline comfort level  Description: Interventions:  - Encourage patient to monitor pain and request assistance  - Assess pain using appropriate pain scale  - Administer analgesics based on type and severity of pain and evaluate response  - Implement non-pharmacological measures as appropriate and evaluate response  - Consider cultural and social influences on pain and pain management  - Notify physician/advanced practitioner if interventions unsuccessful or patient reports new pain  Outcome: Progressing     Problem: INFECTION - ADULT  Goal: Absence or prevention of progression during hospitalization  Description: INTERVENTIONS:  - Assess and monitor for signs and symptoms of infection  - Monitor lab/diagnostic results  - Monitor all insertion sites, i e  indwelling lines, tubes, and drains  - Monitor endotracheal if appropriate and nasal secretions for changes in amount and color  - [place] appropriate cooling/warming therapies per order  - Administer medications as ordered  - Instruct and encourage patient and family to use good hand hygiene technique  - Identify and instruct in appropriate isolation precautions for identified infection/condition  Outcome: Progressing  Goal: Absence of fever/infection during neutropenic period  Description: INTERVENTIONS:  - Monitor WBC    Outcome: Progressing     Problem: SAFETY ADULT  Goal: Patient will remain free of falls  Description: INTERVENTIONS:  - Educate patient/family on patient safety including physical limitations  - Instruct patient to call for assistance with activity   - Consult OT/PT to assist with strengthening/mobility   - Keep Call bell within reach  - Keep bed low and locked with side rails adjusted as appropriate  - Keep care items and personal belongings within reach  - Initiate and maintain comfort rounds  - Make Fall Risk Sign visible to staff  - Apply yellow socks and bracelet for high fall risk patients  - Consider moving patient to room near nurses station  Outcome: Progressing  Goal: Maintain or return to baseline ADL function  Description: INTERVENTIONS:  -  Assess patient's ability to carry out ADLs; assess patient's baseline for ADL function and identify physical deficits which impact ability to perform ADLs (bathing, care of mouth/teeth, toileting, grooming, dressing, etc )  - Assess/evaluate cause of self-care deficits   - Assess range of motion  - Assess patient's mobility; develop plan if impaired  - Assess patient's need for assistive devices and provide as appropriate  - Encourage maximum independence but intervene and supervise when necessary  - Involve family in performance of ADLs  - Assess for home care needs following discharge   - Consider OT consult to assist with ADL evaluation and planning for discharge  - Provide patient education as appropriate  Outcome: Progressing  Goal: Maintains/Returns to pre admission functional level  Description: INTERVENTIONS:  - Perform BMAT or MOVE assessment daily    - Set and communicate daily mobility goal to care team and patient/family/caregiver  - Collaborate with rehabilitation services on mobility goals if consulted  - Out of bed for toileting  - Record patient progress and toleration of activity level   Outcome: Progressing     Problem: Knowledge Deficit  Goal: Patient/family/caregiver demonstrates understanding of disease process, treatment plan, medications, and discharge instructions  Description: Complete learning assessment and assess knowledge base    Interventions:  - Provide teaching at level of understanding  - Provide teaching via preferred learning methods  Outcome: Progressing     Problem: DISCHARGE PLANNING  Goal: Discharge to home or other facility with appropriate resources  Description: INTERVENTIONS:  - Identify barriers to discharge w/patient and caregiver  - Arrange for needed discharge resources and transportation as appropriate  - Identify discharge learning needs (meds, wound care, etc )  - Arrange for interpretive services to assist at discharge as needed  - Refer to Case Management Department for coordinating discharge planning if the patient needs post-hospital services based on physician/advanced practitioner order or complex needs related to functional status, cognitive ability, or social support system  Outcome: Progressing     Problem: POSTPARTUM  Goal: Experiences normal postpartum course  Description: INTERVENTIONS:  - Monitor maternal vital signs  - Assess uterine involution and lochia  Outcome: Progressing  Goal: Appropriate maternal -  bonding  Description: INTERVENTIONS:  - Identify family support  - Assess for appropriate maternal/infant bonding   -Encourage maternal/infant bonding opportunities  - Referral to  or  as needed  Outcome: Progressing  Goal: Establishment of infant feeding pattern  Description: INTERVENTIONS:  - Assess breast/bottle feeding  - Refer to lactation as needed  Outcome: Progressing  Goal: Incision(s), wounds(s) or drain site(s) healing without S/S of infection  Description: INTERVENTIONS  - Assess and document dressing, incision, wound bed, drain sites and surrounding tissue  - Provide patient and family education  Outcome: Progressing     Problem: ALTERATION IN THE BREAST  Goal: Optimize infant feeding at the breast  Description: INTERVENTIONS:  - Latch, breast and nipple assessment  - Assess prior breast feeding history  - Hand expression of breast milk  - For cracked, bleeding and or sore nipples reassess latch, treat damaged nipple  -Educate mother on feeding cues  -Positioning/latch techniques  Outcome: Progressing     Problem: INADEQUATE LATCH, SUCK OR SWALLOW  Goal: Demonstrate ability to latch and sustain latch, audible swallowing and satiety  Description: INTERVENTIONS:  - Assess oral anatomy, notify Physician/AP for abnormal findings  - Establish milk expression  - Maximize feeding opportunity (skin to skin, behavioral state)  - Position/latch techniques  - Discourage use of pacifier-artificial nipple  - Mechanical pumping  - Nipple Shield  - Supplemental formula feeding (Physician/AP order)  - Alternative feeding method  Outcome: Progressing

## 2023-06-21 NOTE — PROGRESS NOTES
"Progress Note - OB/GYN  Keri Amaya 30 y o  female MRN: [id number]  Unit/Bed#: -01 Encounter: [id number]    Assessment and Plan     Keri Amaya is a patient of: [facility]  She is POD# 2 s/p RLTCS and BS  Recovering well and is stable  Requesting discharge      * Status post repeat low transverse  section  Assessment & Plan  , Hgb 11 9 --> 10 1  Voiding spontaneously  Pain: Tylenol and toradol scheduled, [facility] 5/10 PRN    FEN: Tolerating regular diet  DVT ppx: SCDs and Lovenox 40mg BID  Passing flatus   Incision C/D/I           Disposition    - Anticipate discharge home on POD# 2vs3      Subjective/Objective     Chief Complaint: Postoperative State     Subjective:    Keri Amaya is s/p RLTCS  She is POD# 2  She has no current complaints  Pain is well controlled  Patient is currently voiding  She is ambulating  Patient is currently passing flatus and has had no bowel movement  She is tolerating PO, and denies nausea or vomitting  Patient denies fever, chills, chest pain, shortness of breath, or calf tenderness  Lochia is normal  She is  Breastfeeding  Her incision is C/D/I  She is recovering well and is stable         Vitals:   /54 (BP Location: Right arm)   Pulse 70   Temp 98 4 °F (36 9 °C) (Oral)   Resp 18   Ht 5' 6\" (1 676 m)   Wt 131 kg (289 lb)   LMP 09/15/2022   SpO2 98%   Breastfeeding Yes   BMI 46 65 kg/m²     No intake or output data in the 24 hours ending 23 0601    Invasive Devices     None                 Physical Exam:   GEN: Keri Amaya appears well, alert, pleasant and cooperative   CARDIO: RRR  RESP:  Normal respiratory effort  ABDOMEN: soft, no tenderness, no distention, fundus firm, Incision C/D/I  EXTREMITIES: SCDs on, Negative Rajendra's sign bilaterally      Labs:     Hemoglobin   Date Value Ref Range Status   2023 10 1 (L) 11 5 - 15 4 g/dL Final   2023 11 9 11 5 - 15 4 g/dL Final " WBC   Date Value Ref Range Status   06/20/2023 6 87 4 31 - 10 16 Thousand/uL Final   06/19/2023 6 80 4 31 - 10 16 Thousand/uL Final     Platelets   Date Value Ref Range Status   06/20/2023 239 149 - 390 Thousands/uL Final   06/19/2023 239 149 - 390 Thousands/uL Final     Creatinine   Date Value Ref Range Status   06/05/2023 0 53 (L) 0 60 - 1 30 mg/dL Final     Comment:     Standardized to IDMS reference method   01/28/2021 0 52 (L) 0 60 - 1 30 mg/dL Final     Comment:     Standardized to IDMS reference method     AST   Date Value Ref Range Status   06/05/2023 15 13 - 39 U/L Final   01/28/2021 16 5 - 45 U/L Final     Comment:     Specimen collection should occur prior to Sulfasalazine administration due to the potential for falsely depressed results  ALT   Date Value Ref Range Status   06/05/2023 15 7 - 52 U/L Final     Comment:     Specimen collection should occur prior to Sulfasalazine administration due to the potential for falsely depressed results  01/28/2021 34 12 - 78 U/L Final     Comment:     Specimen collection should occur prior to Sulfasalazine administration due to the potential for falsely depressed results             Leron Galeazzi, MD  OBGYN PGY1  6/21/2023  6:01 AM

## 2023-06-21 NOTE — LACTATION NOTE
"This note was copied from a baby's chart  Discharge Lactation: mom states she is offering both breasts at every feeding  Mom is going breast compressions  Mom is mx feeding is baby is not latching well  Mom states baby is cluster feeding  Output is meeting expectations  Enc  To call baby and me earlier than appt if needed    Enc  To go to appt  D/C reviewed    Mom is encouraged to meet early feeding cues, allow for non-nutritive suck, use breast compressions, and monitor baby's output  Mom wants to review feeding logs to verify if baby is meeting daily output of wet and soiled diapers  Nurse on demand: when baby gives hunger cues; when your breasts feel full, or at least every 3 hours during the day and every 5 hours at night counting from the beginning of one feeding to the beginning of the next; which ever comes first  When sucking and swallowing slow, gently compress the breast to restart flow  If active suck-swallow does not restart, gently remove the baby and offer the other breast; offering up to \"four\" breasts per feeding  Discussed 2nd night syndrome and ways to calm infant  Hand out given  Information on hand expression given  Discussed benefits of knowing how to manually express breast including stimulating milk supply, softening nipple for latch and evacuating breast in the event of engorgement  Provided education on growth spurts, when to introduce bottles; paced bottle feeding, and non-nutritive suck at the breast  Provided education on Signs of satiation  Encouraged to call lactation to observe a latch prior to discharge for reassurance  Encouraged to call baby and me with any questions and closely monitor output  Met with mother to go over discharge breastfeeding booklet including the feeding log   Emphasized 8 or more (12) feedings in a 24 hour period, what to expect for the number of diapers per day of life and the progression of properties of the  stooling " pattern  Reviewed breastfeeding and your lifestyle, storage and preparation of breast milk, how to keep you breast pump clean, the employed breastfeeding mother and paced bottle feeding handouts  Booklet included Breastfeeding Resources for after discharge including access to the number for the [id number]

## 2023-06-21 NOTE — PLAN OF CARE
Problem: PAIN - ADULT  Goal: Verbalizes/displays adequate comfort level or baseline comfort level  Description: Interventions:  - Encourage patient to monitor pain and request assistance  - Assess pain using appropriate pain scale  - Administer analgesics based on type and severity of pain and evaluate response  - Implement non-pharmacological measures as appropriate and evaluate response  - Consider cultural and social influences on pain and pain management  - Notify physician/advanced practitioner if interventions unsuccessful or patient reports new pain  Outcome: Progressing     Problem: INFECTION - ADULT  Goal: Absence or prevention of progression during hospitalization  Description: INTERVENTIONS:  - Assess and monitor for signs and symptoms of infection  - Monitor lab/diagnostic results  - Monitor all insertion sites, i e  indwelling lines, tubes, and drains  - Monitor endotracheal if appropriate and nasal secretions for changes in amount and color  - [place] appropriate cooling/warming therapies per order  - Administer medications as ordered  - Instruct and encourage patient and family to use good hand hygiene technique  - Identify and instruct in appropriate isolation precautions for identified infection/condition  Outcome: Progressing  Goal: Absence of fever/infection during neutropenic period  Description: INTERVENTIONS:  - Monitor WBC    Outcome: Progressing     Problem: SAFETY ADULT  Goal: Patient will remain free of falls  Description: INTERVENTIONS:  - Educate patient/family on patient safety including physical limitations  - Instruct patient to call for assistance with activity   - Consult OT/PT to assist with strengthening/mobility   - Keep Call bell within reach  - Keep bed low and locked with side rails adjusted as appropriate  - Keep care items and personal belongings within reach  - Initiate and maintain comfort rounds  - Make Fall Risk Sign visible to staff  - Apply yellow socks and bracelet for high fall risk patients  - Consider moving patient to room near nurses station  Outcome: Progressing  Goal: Maintain or return to baseline ADL function  Description: INTERVENTIONS:  -  Assess patient's ability to carry out ADLs; assess patient's baseline for ADL function and identify physical deficits which impact ability to perform ADLs (bathing, care of mouth/teeth, toileting, grooming, dressing, etc )  - Assess/evaluate cause of self-care deficits   - Assess range of motion  - Assess patient's mobility; develop plan if impaired  - Assess patient's need for assistive devices and provide as appropriate  - Encourage maximum independence but intervene and supervise when necessary  - Involve family in performance of ADLs  - Assess for home care needs following discharge   - Consider OT consult to assist with ADL evaluation and planning for discharge  - Provide patient education as appropriate  Outcome: Progressing  Goal: Maintains/Returns to pre admission functional level  Description: INTERVENTIONS:  - Perform BMAT or MOVE assessment daily    - Set and communicate daily mobility goal to care team and patient/family/caregiver  - Collaborate with rehabilitation services on mobility goals if consulted  - Out of bed for toileting  - Record patient progress and toleration of activity level   Outcome: Progressing     Problem: Knowledge Deficit  Goal: Patient/family/caregiver demonstrates understanding of disease process, treatment plan, medications, and discharge instructions  Description: Complete learning assessment and assess knowledge base    Interventions:  - Provide teaching at level of understanding  - Provide teaching via preferred learning methods  Outcome: Progressing     Problem: DISCHARGE PLANNING  Goal: Discharge to home or other facility with appropriate resources  Description: INTERVENTIONS:  - Identify barriers to discharge w/patient and caregiver  - Arrange for needed discharge resources and transportation as appropriate  - Identify discharge learning needs (meds, wound care, etc )  - Arrange for interpretive services to assist at discharge as needed  - Refer to Case Management Department for coordinating discharge planning if the patient needs post-hospital services based on physician/advanced practitioner order or complex needs related to functional status, cognitive ability, or social support system  Outcome: Progressing     Problem: POSTPARTUM  Goal: Experiences normal postpartum course  Description: INTERVENTIONS:  - Monitor maternal vital signs  - Assess uterine involution and lochia  Outcome: Progressing  Goal: Appropriate maternal -  bonding  Description: INTERVENTIONS:  - Identify family support  - Assess for appropriate maternal/infant bonding   -Encourage maternal/infant bonding opportunities  - Referral to  or  as needed  Outcome: Progressing  Goal: Establishment of infant feeding pattern  Description: INTERVENTIONS:  - Assess breast/bottle feeding  - Refer to lactation as needed  Outcome: Progressing  Goal: Incision(s), wounds(s) or drain site(s) healing without S/S of infection  Description: INTERVENTIONS  - Assess and document dressing, incision, wound bed, drain sites and surrounding tissue  - Provide patient and family education  Outcome: Progressing     Problem: ALTERATION IN THE BREAST  Goal: Optimize infant feeding at the breast  Description: INTERVENTIONS:  - Latch, breast and nipple assessment  - Assess prior breast feeding history  - Hand expression of breast milk  - For cracked, bleeding and or sore nipples reassess latch, treat damaged nipple  -Educate mother on feeding cues  -Positioning/latch techniques  Outcome: Progressing     Problem: INADEQUATE LATCH, SUCK OR SWALLOW  Goal: Demonstrate ability to latch and sustain latch, audible swallowing and satiety  Description: INTERVENTIONS:  - Assess oral anatomy, notify Physician/AP for abnormal findings  - Establish milk expression  - Maximize feeding opportunity (skin to skin, behavioral state)  - Position/latch techniques  - Discourage use of pacifier-artificial nipple  - Mechanical pumping  - Nipple Shield  - Supplemental formula feeding (Physician/AP order)  - Alternative feeding method  Outcome: Progressing

## 2023-06-22 ENCOUNTER — NURSE TRIAGE (OUTPATIENT)
Dept: OTHER | Facility: OTHER | Age: 38
End: 2023-06-22

## 2023-06-22 ENCOUNTER — HOSPITAL ENCOUNTER (EMERGENCY)
Facility: HOSPITAL | Age: 38
Discharge: HOME | End: 2023-06-22
Attending: EMERGENCY MEDICINE | Admitting: OBSTETRICS & GYNECOLOGY
Payer: COMMERCIAL

## 2023-06-22 VITALS
OXYGEN SATURATION: 98 % | HEART RATE: 64 BPM | SYSTOLIC BLOOD PRESSURE: 112 MMHG | RESPIRATION RATE: 18 BRPM | DIASTOLIC BLOOD PRESSURE: 71 MMHG | TEMPERATURE: 98.4 F

## 2023-06-22 DIAGNOSIS — R51.9 HEADACHE: Primary | ICD-10-CM

## 2023-06-22 PROBLEM — R03.0 ELEVATED BLOOD-PRESSURE READING, WITHOUT DIAGNOSIS OF HYPERTENSION: Status: ACTIVE | Noted: 2023-06-22

## 2023-06-22 LAB
ALBUMIN SERPL BCP-MCNC: 2.9 G/DL (ref 3.5–5)
ALP SERPL-CCNC: 81 U/L (ref 34–104)
ALT SERPL W P-5'-P-CCNC: 23 U/L (ref 7–52)
ANION GAP SERPL CALCULATED.3IONS-SCNC: 6 MMOL/L
AST SERPL W P-5'-P-CCNC: 16 U/L (ref 13–39)
BASOPHILS # BLD AUTO: 0.03 THOUSANDS/ÂΜL (ref 0–0.1)
BASOPHILS NFR BLD AUTO: 0 % (ref 0–1)
BILIRUB SERPL-MCNC: 0.2 MG/DL (ref 0.2–1)
BUN SERPL-MCNC: 12 MG/DL (ref 5–25)
CALCIUM ALBUM COR SERPL-MCNC: 9.5 MG/DL (ref 8.3–10.1)
CALCIUM SERPL-MCNC: 8.6 MG/DL (ref 8.4–10.2)
CHLORIDE SERPL-SCNC: 105 MMOL/L (ref 96–108)
CO2 SERPL-SCNC: 25 MMOL/L (ref 21–32)
CREAT SERPL-MCNC: 0.67 MG/DL (ref 0.6–1.3)
CREAT UR-MCNC: 44 MG/DL
EOSINOPHIL # BLD AUTO: 0.25 THOUSAND/ÂΜL (ref 0–0.61)
EOSINOPHIL NFR BLD AUTO: 4 % (ref 0–6)
ERYTHROCYTE [DISTWIDTH] IN BLOOD BY AUTOMATED COUNT: 13.8 % (ref 11.6–15.1)
GFR SERPL CREATININE-BSD FRML MDRD: 114 ML/MIN/1.73SQ M
GLUCOSE SERPL-MCNC: 88 MG/DL (ref 65–140)
HCT VFR BLD AUTO: 31.2 % (ref 34.8–46.1)
HGB BLD-MCNC: 10 G/DL (ref 11.5–15.4)
IMM GRANULOCYTES # BLD AUTO: 0.03 THOUSAND/UL (ref 0–0.2)
IMM GRANULOCYTES NFR BLD AUTO: 0 % (ref 0–2)
LYMPHOCYTES # BLD AUTO: 3.54 THOUSANDS/ÂΜL (ref 0.6–4.47)
LYMPHOCYTES NFR BLD AUTO: 52 % (ref 14–44)
MCH RBC QN AUTO: 27.9 PG (ref 26.8–34.3)
MCHC RBC AUTO-ENTMCNC: 32.1 G/DL (ref 31.4–37.4)
MCV RBC AUTO: 87 FL (ref 82–98)
MONOCYTES # BLD AUTO: 0.32 THOUSAND/ÂΜL (ref 0.17–1.22)
MONOCYTES NFR BLD AUTO: 5 % (ref 4–12)
NEUTROPHILS # BLD AUTO: 2.66 THOUSANDS/ÂΜL (ref 1.85–7.62)
NEUTS SEG NFR BLD AUTO: 39 % (ref 43–75)
NRBC BLD AUTO-RTO: 0 /100 WBCS
PLATELET # BLD AUTO: 305 THOUSANDS/UL (ref 149–390)
PMV BLD AUTO: 9.7 FL (ref 8.9–12.7)
POTASSIUM SERPL-SCNC: 3.8 MMOL/L (ref 3.5–5.3)
PROT SERPL-MCNC: 6 G/DL (ref 6.4–8.4)
PROT UR-MCNC: 15 MG/DL
PROT/CREAT UR: 0.34 MG/G{CREAT} (ref 0–0.1)
RBC # BLD AUTO: 3.59 MILLION/UL (ref 3.81–5.12)
SODIUM SERPL-SCNC: 136 MMOL/L (ref 135–147)
WBC # BLD AUTO: 6.83 THOUSAND/UL (ref 4.31–10.16)

## 2023-06-22 PROCEDURE — 99284 EMERGENCY DEPT VISIT MOD MDM: CPT

## 2023-06-22 PROCEDURE — 36415 COLL VENOUS BLD VENIPUNCTURE: CPT | Performed by: EMERGENCY MEDICINE

## 2023-06-22 PROCEDURE — 80053 COMPREHEN METABOLIC PANEL: CPT | Performed by: EMERGENCY MEDICINE

## 2023-06-22 PROCEDURE — 93005 ELECTROCARDIOGRAM TRACING: CPT

## 2023-06-22 PROCEDURE — NC001 PR NO CHARGE: Performed by: OBSTETRICS & GYNECOLOGY

## 2023-06-22 PROCEDURE — 88302 TISSUE EXAM BY PATHOLOGIST: CPT | Performed by: PATHOLOGY

## 2023-06-22 PROCEDURE — 99213 OFFICE O/P EST LOW 20 MIN: CPT

## 2023-06-22 PROCEDURE — 84156 ASSAY OF PROTEIN URINE: CPT | Performed by: OBSTETRICS & GYNECOLOGY

## 2023-06-22 PROCEDURE — 82570 ASSAY OF URINE CREATININE: CPT | Performed by: OBSTETRICS & GYNECOLOGY

## 2023-06-22 PROCEDURE — 85025 COMPLETE CBC W/AUTO DIFF WBC: CPT | Performed by: EMERGENCY MEDICINE

## 2023-06-22 NOTE — TELEPHONE ENCOUNTER
"Regarding: Postpartum/ Headache/Blurry vision  ----- Message from Derrick Hernandez sent at 6/22/2023  6:10 PM EDT -----  \"I was discharged from labor and delivery yesterday   I am experiencing a headache with blurry vision\"    "

## 2023-06-22 NOTE — TELEPHONE ENCOUNTER
"  Reason for Disposition  • [1] Blurred vision or visual changes AND [2] present now AND [3] sudden onset or new (e g , minutes, hours, days)    Answer Assessment - Initial Assessment Questions  1  DESCRIPTION: Sania Gastelum is the vision loss like? Describe it for me  \" (e g , complete vision loss, blurred vision, double vision, floaters, etc )      Her left eye was blurry for 30-40 minutes  2  LOCATION: \"One or both eyes? \" If one, ask: \"Which eye?\"      Left eye  3  SEVERITY: \"Can you see anything? \" If Yes, ask: \"What can you see? \" (e g , fine print)     The vision is almost normal but not completely  She see white floaters  4  ONSET: \"When did this begin? \" \"Did it start suddenly (minutes, hours) or has this been gradual (weeks, months)? \"      within the last hour  5  PATTERN: \"Does this come and go, or has it been constant since it started? \"      The blurry vision is now gone  6  PAIN: \"Is there any pain in your eye(s)? \"  (Scale 1-10; or mild, moderate, severe)      No pain in the eyes, mild headache  7  CONTACTS-GLASSES: \"Do you wear contacts or glasses? \"      She wears glasses as needed  8  CAUSE: \"What do you think is causing this visual problem? \"      Unsure  9  PREECLAMPSIA:  \"Were you diagnosed with preeclampsia during your pregnancy? \" none         10  OTHER SYMPTOMS: \"Do you have any other symptoms? \" (e g , headache, arm or leg weakness)        Headache was a 5/10 and now  its 3/10  She is on tylenol and motrin on board  Tylenol Q 6 hr and motrin in between  Had c section  11  DELIVERY DATE: \"When was your delivery date? \" \"Vaginal delivery or ? \" 2023      Bp is 110/76    Protocols used: POSTPARTUM - VISION LOSS OR CHANGE-ADULT-AH    "

## 2023-06-22 NOTE — ED PROVIDER NOTES
History  Chief Complaint   Patient presents with   • Headache     Pt reports waking up around 5pm with a headache and left eye blurriness with floaters  Reports took tylenol  During triage reports vision back to normal with headache subsiding       This is a 29-year-old female who had a  this past Monday for repeat reasons at 37 weeks without complications  Patient just started producing milk today  She states that      History provided by:  Patient  Medical Problem  Location:  Global headache with left-sided visual change  Quality:  Blurry vision and flashing of lights which has resolved  Severity:  Moderate  Onset quality:  Sudden  Duration:  30 minutes  Progression:  Resolved  Chronicity:  New  Context:  Headache and visual changes which have resolved  Relieved by:  Tylenol  Associated symptoms: headaches        Prior to Admission Medications   Prescriptions Last Dose Informant Patient Reported? Taking?    Prenatal MV-Min-Fe Fum-FA-DHA (PRENATAL 1 PO)  Self Yes No   Sig: Take 1 tablet by mouth in the morning   acetaminophen (TYLENOL) 500 mg tablet  Self Yes No   Sig: Take 500 mg by mouth every 6 (six) hours as needed for mild pain   cetirizine (ZyrTEC) 10 mg tablet  Self Yes No   Sig: Take 10 mg by mouth daily   docusate sodium (COLACE) 100 mg capsule   No No   Sig: Take 1 capsule (100 mg total) by mouth 2 (two) times a day   ibuprofen (MOTRIN) 600 mg tablet   No No   Sig: Take 1 tablet (600 mg total) by mouth every 6 (six) hours as needed for moderate pain   omeprazole (PriLOSEC) 20 mg delayed release capsule  Self No No   Sig: Take 1 capsule (20 mg total) by mouth daily   oxyCODONE (ROXICODONE) 5 immediate release tablet   No No   Sig: Take 1 tablet (5 mg total) by mouth every 6 (six) hours as needed for severe pain for up to 5 doses Max Daily Amount: 20 mg      Facility-Administered Medications: None       Past Medical History:   Diagnosis Date   • Abnormal Pap smear of cervix    • Elevated blood-pressure reading, without diagnosis of hypertension 2023   • Gastritis    • HPV (human papilloma virus) infection    • Varicella        Past Surgical History:   Procedure Laterality Date   • ANKLE SURGERY Left    • CERVIX LESION DESTRUCTION      Laser- in [address]   •  SECTION     • COLPOSCOPY     • MOUTH SURGERY     • ND  DELIVERY ONLY N/A 2021    Procedure:  SECTION (); Surgeon: Karla Bales MD;  Location: [facility];  Service: Obstetrics   • ND  DELIVERY ONLY N/A 2023    Procedure: Albert Marquez () REPEAT;  Surgeon: Jos Mcclendon MD;  Location: [facility];  Service: Obstetrics   • ND ESOPHAGOGASTRODUODENOSCOPY TRANSORAL DIAGNOSTIC N/A 2018    Procedure: ESOPHAGOGASTRODUODENOSCOPY (EGD); Surgeon: Holland Espinoza MD;  Location: [facility]; Service: Gastroenterology   • ND LIG/TRNSXJ FALOPIAN TUBE  DEL/ABDML SURG Bilateral 2023    Procedure: LIGATION/COAGULATION TUBAL;  Surgeon: Jos Mcclendon MD;  Location: [facility];  Service: Obstetrics   • TONSILLECTOMY     • WISDOM TOOTH EXTRACTION         Family History   Problem Relation Age of Onset   • Fibroids Mother    • Osteopenia Mother    • Hyperlipidemia Mother    • Osteoporosis Mother    • Hyperlipidemia Father    • Hyperlipidemia Brother    • No Known Problems Daughter    • Osteoporosis Maternal Grandmother    • Hypertension Maternal Grandmother    • Depression Maternal Grandmother    • No Known Problems Maternal Grandfather    • Hypertension Paternal Grandmother    • Osteoporosis Paternal Grandmother    • Depression Paternal Grandmother    • Stroke Paternal Grandmother    • Hyperlipidemia Paternal Grandmother    • Diabetes Paternal Grandfather    • Hyperlipidemia Paternal Grandfather    • Breast cancer Neg Hx    • Colon cancer Neg Hx    • Ovarian cancer Neg Hx      I have reviewed and agree with the history as documented      E-Cigarette/Vaping   • E-Cigarette Use Never User      E-Cigarette/Vaping Substances   • Nicotine No    • THC No    • CBD No    • Flavoring No    • Other No    • Unknown No      Social History     Tobacco Use   • Smoking status: Never   • Smokeless tobacco: Never   Vaping Use   • Vaping Use: Never used   Substance Use Topics   • Alcohol use: Not Currently     Comment: social   • Drug use: No       Review of Systems   Eyes: Positive for visual disturbance  Neurological: Positive for headaches  All other systems reviewed and are negative  Physical Exam  Physical Exam  Vitals and nursing note reviewed  Constitutional:       General: She is not in acute distress  Appearance: She is not ill-appearing, toxic-appearing or diaphoretic  HENT:      Head: Normocephalic and atraumatic  Right Ear: Tympanic membrane, ear canal and external ear normal       Left Ear: Tympanic membrane, ear canal and external ear normal       Nose: Nose normal    Eyes:      General: No scleral icterus  Right eye: No discharge  Left eye: No discharge  Extraocular Movements: Extraocular movements intact  Pupils: Pupils are equal, round, and reactive to light  Cardiovascular:      Rate and Rhythm: Normal rate and regular rhythm  Pulses: Normal pulses  Heart sounds: No murmur heard  No friction rub  No gallop  Pulmonary:      Effort: Pulmonary effort is normal  No respiratory distress  Breath sounds: No stridor  No wheezing, rhonchi or rales  Abdominal:      General: There is no distension  Palpations: Abdomen is soft  Tenderness: There is no abdominal tenderness  There is no guarding or rebound  Musculoskeletal:         General: No swelling, tenderness, deformity or signs of injury  Normal range of motion  Cervical back: Normal range of motion and neck supple  No rigidity or tenderness  Right lower leg: No edema  Left lower leg: No edema     Skin:     General: Skin is warm and dry       Findings: No erythema or rash  Comments: Surgical  site healing well without any sign of infection or bleeding   Neurological:      General: No focal deficit present  Mental Status: She is alert and oriented to person, place, and time  Cranial Nerves: No cranial nerve deficit  Sensory: No sensory deficit  Motor: No weakness  Coordination: Coordination normal    Psychiatric:         Behavior: Behavior normal          Thought Content:  Thought content normal          Vital Signs  ED Triage Vitals [23 1922]   Temperature Pulse Respirations Blood Pressure SpO2   98 5 °F (36 9 °C) 75 20 155/94 98 %      Temp Source Heart Rate Source Patient Position - Orthostatic VS BP Location FiO2 (%)   Tympanic Monitor Sitting Left arm --      Pain Score       4           Vitals:    23 2112 230 23   BP: 132/66 119/65 110/65 112/71   Pulse: 63 62 67 64   Patient Position - Orthostatic VS:             Visual Acuity      ED Medications  Medications - No data to display    Diagnostic Studies  Results Reviewed     Procedure Component Value Units Date/Time    Comprehensive metabolic panel [[id number]]  (Abnormal) Collected: 23    Lab Status: Final result Specimen: Blood from Arm, Left Updated: 23     Sodium 136 mmol/L      Potassium 3 8 mmol/L      Chloride 105 mmol/L      CO2 25 mmol/L      ANION GAP 6 mmol/L      BUN 12 mg/dL      Creatinine 0 67 mg/dL      Glucose 88 mg/dL      Calcium 8 6 mg/dL      Corrected Calcium 9 5 mg/dL      AST 16 U/L      ALT 23 U/L      Alkaline Phosphatase 81 U/L      Total Protein 6 0 g/dL      Albumin 2 9 g/dL      Total Bilirubin 0 20 mg/dL      eGFR 114 ml/min/1 73sq m     Narrative:      [place] guidelines for Chronic Kidney Disease (CKD):   •  Stage 1 with normal or high GFR (GFR > 90 mL/min/1 73 square meters)  •  Stage 2 Mild CKD (GFR = 60-89 mL/min/1 73 square meters)  •  Stage 3A Moderate CKD (GFR = 45-59 mL/min/1 73 square meters)  •  Stage 3B Moderate CKD (GFR = 30-44 mL/min/1 73 square meters)  •  Stage 4 Severe CKD (GFR = 15-29 mL/min/1 73 square meters)  •  Stage 5 End Stage CKD (GFR <15 mL/min/1 73 square meters)  Note: GFR calculation is accurate only with a steady state creatinine    CBC and differential [[id number]]  (Abnormal) Collected: 06/22/23 2008    Lab Status: Final result Specimen: Blood from Arm, Left Updated: 06/22/23 2019     WBC 6 83 Thousand/uL      RBC 3 59 Million/uL      Hemoglobin 10 0 g/dL      Hematocrit 31 2 %      MCV 87 fL      MCH 27 9 pg      MCHC 32 1 g/dL      RDW 13 8 %      MPV 9 7 fL      Platelets 114 Thousands/uL      nRBC 0 /100 WBCs      Neutrophils Relative 39 %      Immat GRANS % 0 %      Lymphocytes Relative 52 %      Monocytes Relative 5 %      Eosinophils Relative 4 %      Basophils Relative 0 %      Neutrophils Absolute 2 66 Thousands/µL      Immature Grans Absolute 0 03 Thousand/uL      Lymphocytes Absolute 3 54 Thousands/µL      Monocytes Absolute 0 32 Thousand/µL      Eosinophils Absolute 0 25 Thousand/µL      Basophils Absolute 0 03 Thousands/µL                  No orders to display              Procedures  ECG 12 Lead Documentation Only    Date/Time: 6/22/2023 8:14 PM    Performed by: Concepcion Aldana DO  Authorized by: Concepcion Aldana DO    ECG reviewed by me, the ED Provider: yes    Patient location:  ED  Rate:     ECG rate:  63  Rhythm:     Rhythm: sinus rhythm    Conduction:     Conduction: normal    T waves:     T waves: normal               ED Course  ED Course as of 06/23/23 1610   Thu Jun 22, 2023 2008 Charge nurse discussed case with OB/GYN who request patient to be brought up to labor and delivery for evaluation IV established blood work in process and will recheck blood pressure   2011 Repeat blood pressure is 103/61 and patient is being transferred up to the OB/GYN floor Medical Decision Making  Headache with visual changes low clinical suspicion for acute CNS event differential at this time includes migraine headache muscle tension headache dehydration    Amount and/or Complexity of Data Reviewed  Labs: ordered            Disposition  Final diagnoses:   Headache - With transient visual changes     Time reflects when diagnosis was documented in both MDM as applicable and the Disposition within this note     Time User Action Codes Description Comment    6/22/2023  8:09 PM Evelia Champion [R51 9] Headache     6/22/2023  8:09 PM Silver Ruiz [R51 9] Headache With transient visual changes      ED Disposition     ED Disposition   Send to L&D After Provider Eval    Condition   --    Date/Time   Thu Jun 22, 2023  8:07 PM    Comment   --         Follow-up Information    None         Discharge Medication List as of 6/22/2023 10:06 PM      CONTINUE these medications which have NOT CHANGED    Details   acetaminophen (TYLENOL) 500 mg tablet Take 500 mg by mouth every 6 (six) hours as needed for mild pain, Historical Med      cetirizine (ZyrTEC) 10 mg tablet Take 10 mg by mouth daily, Historical Med      docusate sodium (COLACE) 100 mg capsule Take 1 capsule (100 mg total) by mouth 2 (two) times a day, Starting Wed 6/21/2023, No Print      ibuprofen (MOTRIN) 600 mg tablet Take 1 tablet (600 mg total) by mouth every 6 (six) hours as needed for moderate pain, Starting Wed 6/21/2023, No Print      omeprazole (PriLOSEC) 20 mg delayed release capsule Take 1 capsule (20 mg total) by mouth daily, Starting Tue 12/1/2020, Normal      oxyCODONE (ROXICODONE) 5 immediate release tablet Take 1 tablet (5 mg total) by mouth every 6 (six) hours as needed for severe pain for up to 5 doses Max Daily Amount: 20 mg, Starting Wed 6/21/2023, Normal      Prenatal MV-Min-Fe Fum-FA-DHA (PRENATAL 1 PO) Take 1 tablet by mouth in the morning, Historical Med No discharge procedures on file      PDMP Review     None          ED Provider  Electronically Signed by           Monica Burch DO  06/23/23 1152

## 2023-06-23 ENCOUNTER — TELEPHONE (OUTPATIENT)
Dept: OBGYN CLINIC | Facility: CLINIC | Age: 38
End: 2023-06-23

## 2023-06-23 ENCOUNTER — OFFICE VISIT (OUTPATIENT)
Dept: POSTPARTUM | Facility: CLINIC | Age: 38
End: 2023-06-23
Payer: COMMERCIAL

## 2023-06-23 VITALS — SYSTOLIC BLOOD PRESSURE: 122 MMHG | DIASTOLIC BLOOD PRESSURE: 84 MMHG

## 2023-06-23 LAB
ATRIAL RATE: 63 BPM
P AXIS: 53 DEGREES
PR INTERVAL: 148 MS
QRS AXIS: 12 DEGREES
QRSD INTERVAL: 86 MS
QT INTERVAL: 380 MS
QTC INTERVAL: 388 MS
T WAVE AXIS: 17 DEGREES
VENTRICULAR RATE: 63 BPM

## 2023-06-23 PROCEDURE — 99404 PREV MED CNSL INDIV APPRX 60: CPT | Performed by: PEDIATRICS

## 2023-06-23 PROCEDURE — 93010 ELECTROCARDIOGRAM REPORT: CPT | Performed by: INTERNAL MEDICINE

## 2023-06-23 NOTE — PATIENT INSTRUCTIONS
"-I recommend lots of tummy time for Mirna to help with reflex development and muscle relaxation  Also ensure she is able to extend her head back when offering the breast  This will help her gape more widely and establish a deep initial latch  -Feed baby on demand, watch for hunger and fulness cues  Monitor diapers daily for signs of hydration, follow up with Pediatrician as scheduled   -Latching should be without pain, if experiencing pain or you feel the latch is shallow please assist baby to release the breast (insert finger to the corner of the baby's mouth to break suction), make positional changes needed, and perform proper \"U\" breast shaping to assist baby to achieve a deeper, more comfortable latch   -Refer to this video for review of good latching techniques: Attaching Your Baby at the Breast - Video - [facility]   -Reverse pressure softening to soften areola before latching attempts:  Engorgement Help: Reverse Pressure Softening  [employer]   -Pump as needed for uncomfortable engorgement, utilize flange fit and settings that are comfortable for you, pumping should not be painful!   - I recommend waiting to offer a bottle until breastfeeding is well established  When doing so, utilize paced bottle feeding technique anytime you offer a bottle, this will prevent her from overfeeding, getting overwhelmed with the flow and assist in transitioning from breast to bottle more easily  How to bottle feed the  baby  [employer]    -Healing techniques for nipples : lanolin and airflow, or apply lanolin and cover with a piece of wax or parchment paper over top in between feedings  - Storing and Thawing Breast Milk  [facility] Breastfeeding Support ([employer])    -Please call or scheduled a follow up appointment with lactation as needed for questions or concerns you may have      "

## 2023-06-23 NOTE — PROGRESS NOTES
INITIAL BREAST FEEDING EVALUATION    Informant/Relationship: Skip Rick (self, Mom), Tracy Page (dad)    Discussion of General Lactation Issues: First daughter had trouble latching, Mom needed nipple shield until she had tongue tie release, this was done when she was a few weeks old by Dr Bob Ross  This child can latch but doesn't always open wide  Her nipple shape is different and feels this is why baby is latching more easily, but she wants to have her latch checked because she doesn't feel it is deep enough  Infant is 3days old today   History:  Fertility Problem:no  Breast changes:yes - enlargement, tenderness  : repeat C/S, scheduled  Her pregnancy and delivery went well  Full term:yes - 44 and 4   labor:no  First nursing/attempt < 1 hour after birth:unknown, Mom does not remember timing but she feels this was in the PACU  Feels this was a good latch at the time     Skin to skin following delivery:no, but once baby was with Mom in the PACU they were skin to skin  Breast changes after delivery:yes - she has a lot of milk, per Mom  Rooming in (infant in room with mother with exception of procedures, eg  Circumcision: yes - entire stay and lots of skin to skin  Blood sugar issues:no  NICU stay:no  Jaundice:no  Phototherapy:no  Supplement given: (list supplement and method used as well as reason(s):no    Past Medical History:   Diagnosis Date   • Abnormal Pap smear of cervix    • Elevated blood-pressure reading, without diagnosis of hypertension 2023   • Gastritis    • HPV (human papilloma virus) infection    • Varicella          Current Outpatient Medications:   •  acetaminophen (TYLENOL) 500 mg tablet, Take 500 mg by mouth every 6 (six) hours as needed for mild pain, Disp: , Rfl:   •  cetirizine (ZyrTEC) 10 mg tablet, Take 10 mg by mouth daily, Disp: , Rfl:   •  docusate sodium (COLACE) 100 mg capsule, Take 1 capsule (100 mg total) by mouth 2 (two) times a day, Disp: , Rfl: 0  • ibuprofen (MOTRIN) 600 mg tablet, Take 1 tablet (600 mg total) by mouth every 6 (six) hours as needed for moderate pain, Disp: 30 tablet, Rfl: 0  •  omeprazole (PriLOSEC) 20 mg delayed release capsule, Take 1 capsule (20 mg total) by mouth daily, Disp: 90 capsule, Rfl: 2  •  oxyCODONE (ROXICODONE) 5 immediate release tablet, Take 1 tablet (5 mg total) by mouth every 6 (six) hours as needed for severe pain for up to 5 doses Max Daily Amount: 20 mg, Disp: 5 tablet, Rfl: 0  •  Prenatal MV-Min-Fe Fum-FA-DHA (PRENATAL 1 PO), Take 1 tablet by mouth in the morning, Disp: , Rfl:   No current facility-administered medications for this visit  No Known Allergies    Social History     Substance and Sexual Activity   Drug Use No       Social History     Interval Breastfeeding History:    Frequency of breast feeding: every 2-3 hours   Does mother feel breastfeeding is effective: Yes, but is hesitant to latch because of nipple pain  Does infant appear satisfied after nursing:Yes  Stooling pattern normal: lYes  Urinating frequently:Yes  Using shield or shells: No    Alternative/Artificial Feedings:   Bottle: Yes, nipples that go with the Medela bottle   Cup: No  Syringe/Finger: No                   Breast Milk:                      Amount: 1 5 ounces every 2-3 hours, when replacing feedings at the breast     Baby gets pacifier after feedings  Elimination Problems: No      Equipment:  Pump            Type: Medela flex            Frequency of Use: every 2 hours yesterday she was pumping in place of latching the baby     Equipment Problems: no    Mom:  Breast: Engorgement/Swelling, hot to the touch, tender per Mom  Nipple Assessment in General: scabs along nipple faced noted bilaterally, L is more tender than the R   Mother's Awareness of Feeding Cues                 Recognizes: Yes                  Verbalizes: Yes  Support System: Good family support at home  Older daughter, Janie Gooden is 35 years old     History of Breastfeeding: Nursed for 12 mo, baby had a tongue tie, post release there were not latching issues  Changes/Stressors/Violence: want to have her tongue and latch checked, latching/breastfeeding is painful  Concerns/Goals: Breastfeeding without pain, she does not want to have to give any formula  At least 6 mo of just breastmilk  Problems with Mom: engorgement, nipple damage, newly postpartum/post op  Using lanolin cream on her nipples for the tenderness  Pumping is also painful due to the damage  Physical Exam  Constitutional:       Appearance: Normal appearance  HENT:      Head: Normocephalic  Cardiovascular:      Rate and Rhythm: Normal rate  Pulses: Normal pulses  Pulmonary:      Effort: Pulmonary effort is normal       Breath sounds: Normal breath sounds  Musculoskeletal:         General: Swelling present  Normal range of motion  Cervical back: Normal range of motion  Right lower leg: Edema present  Left lower leg: Edema present  Comments: General swelling, LE bilaterally     Neurological:      General: No focal deficit present  Mental Status: She is alert and oriented to person, place, and time  Psychiatric:         Mood and Affect: Mood normal          Behavior: Behavior normal          Thought Content:  Thought content normal          Infant:  Behaviors: Sleepy, but active on the breast   Color: Jaundice  Birth weight: 3730 g  Current weight: 3395 g      Problems with infant: hypertonic, tight jaw      General Appearance:  Alert, active, no distress                            Head:  Normocephalic, AFOF, sutures opposed                            Eyes:   Conjunctiva clear, no drainage                            Ears:   Normally placed, no anomolies                           Nose:   Septum intact, no drainage or erythema                          Mouth:  No lesions, tongue lifts, good lateralization and extension, maintains strong suction and peristalsis on gloved finger  Jaw is tight  Neck:  Supple, symmetrical, trachea midline                Respiratory:  No grunting, flaring, retractions, breath sounds clear and equal           Cardiovascular:  Regular rate and rhythm  No murmur  Adequate perfusion/capillary refill  Femoral pulse present                  Abdomen:    Soft, non-tender, no masses, bowel sounds present, no HSM  Umbilical stump dry and intact            Genitourinary:  Normal female genitalia, anus patent                         Spine:   No abnormalities noted       Musculoskeletal:   Full range of motion         Skin/Hair/Nails:   Skin warm, dry, and intact, no rashes or abnormal dyspigmentation or lesions               Neurologic:   No abnormal movement, tone appropriate for gestational age    [facility] Assessment for Lingual Frenulum Function    Appearance Items Function Items   Appearance of tongue when lifted  2: Round or square   Lateralization  2: Complete   Elasticity of frenulum  2: Very elastic   Lift of tongue  2: Tip to mid-mouth     Length of lingual frenulum when tongue lifted  lingual frenulum length: 2: > 1cm     Extension of tongue  2: Tip over lower lip   Attachment of lingual frenulum to tongue  2: Posterior to tip   Spread of anterior tongue  2: Complete   Attachment of lingual frenulum to inferior alveolar ridge  2: Attached to floor of mouth or well below ridge Cupping  2: Entire edge, firm cup   Ankyloglossia Grading:  Class I: mild, 12-16 mm  Class II: moderate, 8-11 mm  Class III: severe, 3-7 mm  ClassIV: complete, less than 3 mm Peristalsis  2: Complete, anterior to posterior       SCORE:    Appearance: 10 (<8=ankyloglossia)  Function: 14 (<11=ankyloglossia) Snapback  2: None         [place] Latch:  Efficiency:               Lips Flanged: Yes              Depth of latch: wide latch               Audible Swallow:  Yes              Visible Milk: Yes              Wide Open/ Asymmetrical: Yes              Suck Swallow Cycle: Breathing: good SSB, unlabored, Coordinated: yes  Nipple Assessment after latch: Normal: elongated/eraser, no discoloration and no damage noted  Latch Problems: latch on tenderness, wide latch and rhythmic sucking observed throughout the remainder of the feeding  Position:  Infant's Ergonomics/Body               Body Alignment: Yes               Head Supported: Yes               Close to Mom's body/ Lifted/ Supported: Yes               Mom's Ergonomics/Body: Yes                           Supported: Yes                           Sitting Back: Yes, with guidance                           Brings Baby to her breast: Yes  Positioning Problems: Discussed importance of comfortably reclined position, and baby's next extension  Mom latches baby in laid back/BN positioning  She reports increased comfort overall  Handouts:   Engorgement and Paced bottle feeding    Education:  Reviewed Latch: importance of deep latch without pain  Reviewed Positioning for Dyad: proper alignment and head angle when positioning at the breast   Reviewed Frequency/Supply & Demand: offer the breast at each feeding, pump if baby is not latching and effective transferring milk  Reviewed Infant:Cues and varied States of Awareness: watch for hunger cues, feed on demand  If baby seems satisfied at the breast (calm, relaxed sleeping, breasts are softer) no need to pump or supplement   Reviewed Infant Elimination: goal of 6+ wets and 2-3 stools per day   Reviewed Alternative/Artificial Feedings: paced bottle feeding technique demonstrated  Reviewed Mom/Breast care: tips to manage engorgement and promote nipple healing  Reviewed lymphatic drainage and reverse pressure softening  Reviewed Equipment: Hand pump and electric pump general guidance, recommended she brings her pumps with her to our follow up visit   Discussed proper flange fit, how to measure         Plan:  Demand feeds (at least every 3 hours until achieving birth weight) and monitor output daily  Offer breast first, followed by pumped milk or supplemental formula as needed via paced bottle feeding  Pump if feedings at the breast are not effective  Do not allow breasts to be uncomfortablly engorged, watch for s/s of mastitis and contact provider for fever and chills that align with breast symptoms  Follow up with Ped as scheduled, follow up with lactation as needed for latching and pumping support  I have spent 90 minutes with Patient and family today in which greater than 50% of this time was spent in counseling/coordination of care regarding Patient and family education

## 2023-06-25 LAB — PLACENTA IN STORAGE: NORMAL

## 2023-06-26 NOTE — PROGRESS NOTES
I have reviewed the notes, assessments, and/or procedures performed by Chicho Leger RN, IBCLC, I concur with her/his documentation of Anna Cote MD 06/25/23

## 2023-06-27 NOTE — DISCHARGE SUMMARY
CS Discharge Summary - Myriam Mena 30 y o  female MRN: [id number]    Unit/Bed#: -01 Encounter: [id number]    Admission Date: 2023     Discharge Date: 23    Admitting Diagnosis:   1  Pregnancy at 39 weeks of gestation   2  History of prior  section   3  Request for permeant sterilization   4  Elevated BMI in pregnancy   5  AMA   6  Polyhydramnios in pregnancy     Discharge Diagnosis:   Same, delivered    Procedures:   repeat  section, low transverse incision  Bilateral salpingectomy    Discharge Attending: Dr Clark Riley service: none  Consult attending: none    Hospital Course:     Myriam Mena is a 30 y o  [id number] who was admitted at 39w4d for RLTCS + BS  She then underwent an uncomplicated  section delivery and delivered a viable female  at 36  APGARS were 8, 9 at 1 and 5 minutes, respectively   weighed 8lb 3 6oz  Placenta was delivered at 1126   was then transferred to  nursery  Patient tolerated the procedure well and was transferred to recovery in stable condition  The patient's post partum course was unremarkable    Preoperative hemaglobin was 11 9, postoperative was 10 1  Her postoperative pain was well controlled with oral analgesics  On day of discharge, she was ambulating and able to reasonably perform all ADLs  She was voiding and had appropriate bowel function  Pain was well controlled  She was discharged home on post-operative day #2 without complications  Patient was instructed to follow up with her OB as an outpatient and was given appropriate warnings to call provider if she develops signs of infection or uncontrolled pain  Complications:   None    Condition at discharge:   good     Provisions for Follow-Up Care:  See after visit summary for information related to follow-up care and any pertinent home health orders        Disposition:   Home    Planned Readmission:   No    Discharge Medications:   Prenatal vitamin daily for 6 months or the duration of nursing whichever is longer  Motrin 600 mg orally every 6 hours as needed for pain  Tylenol (over the counter) per bottle directions as needed for pain  Hydrocortisone cream 1% (over the counter) applied 1-2x daily to hemorrhoids as needed  Witch hazel pads for hemorrhoidal discomfort as needed      Discharge instructions :   -Do not place anything (no partner, tampons or douche) in your vagina for 6 weeks  -You may walk for exercise for the first 6 weeks then gradually return to your usual activities    -Please do not drive for 1 week if you have no stitches and for 2 weeks if you have stitches or underwent a  delivery     -You may take baths or shower per your preference    -Please look at your bust (breasts) in the mirror daily and call provider for redness or tenderness or increased warmth  - If you have had a  please look at your incision daily as well and call provider for increasing redness or steady drainage from the incision    -Please call your provider if temperature > 100 4*F or 38* C, worsening pain or a foul discharge      Meghan BAKER PGY1

## 2023-06-28 ENCOUNTER — TELEPHONE (OUTPATIENT)
Dept: OBGYN CLINIC | Facility: CLINIC | Age: 38
End: 2023-06-28

## 2023-06-28 NOTE — TELEPHONE ENCOUNTER
C section 6/19  She went to ER with a headache 6/22  PB was elevated (150/90) but pt said she was crying and upset  She never had hi bp   patients bp during pregnancy was low  Pt left ER with no treatment - headache went away by itself  Today, pt got headache about 5:30 and took tylenol  She will alternate tylenol and advil  Her bp now is 122/89  She has no eye symptoms  She is breast feeding w/o problems  Pt will call back later if headache persists later

## 2023-07-08 ENCOUNTER — NURSE TRIAGE (OUTPATIENT)
Dept: OTHER | Facility: OTHER | Age: 38
End: 2023-07-08

## 2023-07-08 NOTE — TELEPHONE ENCOUNTER
Reason for Disposition  • MODERATE vaginal bleeding (e.g., soaking 1 pad or tampon per hour and present > 6 hours)    Answer Assessment - Initial Assessment Questions  1. ONSET: "Describe your bleeding: is it getting worse, staying the same, improving, or stopping and starting?"     Started 1200 today, Increased bleeding with walking; small clots and a lot of blood  2. AMOUNT: "How much bleeding are you having today?"      - SPOTTING: spotting, or pinkish / brownish mucous discharge; does not fill panti-liner or pad     - MILD:  less than 1 pad / hour; less than patient's usual menstrual bleeding    - MODERATE: 1-2 pads / hour; 1 menstrual cup every 6 hours; small-medium blood clots (e.g., pea, grape, small coin)    - SEVERE: soaking 2 or more pads/hour for 2 or more hours; 1 menstrual cup every 2 hours; bleeding not contained by pads or continuous red blood from vagina; large blood clots (e.g., golf ball, large coin)       2.5 pads in 4.5 hours. 3. ABDOMINAL PAIN: "Do you have any pain?" "How bad is the pain?" "What does it keep you from doing?"      - MILD -  doesn't interfere with normal activities, abdomen soft and not tender to touch      - MODERATE - interferes with normal activities or awakens from sleep, tender to touch      - SEVERE - excruciating pain, doubled over, unable to do any normal activities        1/10  4. HORMONES: "Are you taking any birth control medications?" (e.g., birth control pills, Depo-Provera)     no  5. BLOOD THINNERS: "Do you take any blood thinners?" (e.g., coumadin, aspirin)      no  6. OTHER SYMPTOMS: "Do you have any other symptoms?" (e.g., fever, chills, dizziness)      denies  7. DELIVERY DATE: "When was your delivery date?" "Vaginal delivery or ?"      8.  BREASTFEEDING: "Are you breastfeeding?"   yes    Protocols used: POSTPARTUM - VAGINAL BLEEDING AND LOCHIA-ADULT-AH

## 2023-07-08 NOTE — TELEPHONE ENCOUNTER
Regarding: Excessiive bleeding  ----- Message from Bonilla Caballero sent at 7/8/2023  4:46 PM EDT -----  '' I'm still having excessive bleeding I filled a two and a half pad within 4 hours. ''

## 2023-07-08 NOTE — TELEPHONE ENCOUNTER
On call provider contacted. Recommends to continue to monitor. If bleeding increases proceed to ER. May take 600mg Motrin q6h. Follow with office Monday.

## 2023-07-10 ENCOUNTER — TELEPHONE (OUTPATIENT)
Dept: OBGYN CLINIC | Facility: CLINIC | Age: 38
End: 2023-07-10

## 2023-07-10 NOTE — TELEPHONE ENCOUNTER
Pt had bleeding on Sat - filled a pad and then light bleeding after. Hernando Negro , the same thing happened. Not bleeding today - Pt has an appt tomorrow. I told her to call back if heavy bleeding today.

## 2023-07-11 ENCOUNTER — POSTPARTUM VISIT (OUTPATIENT)
Age: 38
End: 2023-07-11

## 2023-07-11 VITALS — DIASTOLIC BLOOD PRESSURE: 78 MMHG | BODY MASS INDEX: 43.87 KG/M2 | SYSTOLIC BLOOD PRESSURE: 124 MMHG | WEIGHT: 271.8 LBS

## 2023-07-11 PROCEDURE — 99024 POSTOP FOLLOW-UP VISIT: CPT | Performed by: OBSTETRICS & GYNECOLOGY

## 2023-07-12 NOTE — PROGRESS NOTES
Gucci Clemente  1985    S:  30 y.o. female here for postpartum visit. She is s/p  (Repeat and BS) on 2023 . Has been having episodes daily of heavier bleeding, with no to minimal bleeding in between. Can gush heavy for 1-2 hours, and then it stops. Bright red. Gender: female   Apgars: 8/9  Weight: 8lb 3.6oz  She is Breastfeeding without problems. She denies postpartum blues/depression. Her EPDS is 6. Last Pap: 2021 - NILM, Neg HPV    Salpingectomy for contraception    O:   /78 (BP Location: Right arm, Patient Position: Sitting, Cuff Size: Standard)   Wt 123 kg (271 lb 12.8 oz)   LMP 09/15/2022   Breastfeeding Yes   BMI 43.87 kg/m²   She appears well and in no distress  Abdomen is soft and nontender, incision  Clean/dry/intact      A/P:  Postpartum visit. Bleeding - if does not continue to resolve labs and pelvic US ordered to eval for retained POC/other problems. She will return in 3-4 months for her yearly exam, sooner PRN.

## 2023-07-13 ENCOUNTER — APPOINTMENT (OUTPATIENT)
Dept: LAB | Facility: HOSPITAL | Age: 38
End: 2023-07-13
Payer: COMMERCIAL

## 2023-07-13 ENCOUNTER — HOSPITAL ENCOUNTER (OUTPATIENT)
Dept: ULTRASOUND IMAGING | Facility: HOSPITAL | Age: 38
End: 2023-07-13
Attending: OBSTETRICS & GYNECOLOGY
Payer: COMMERCIAL

## 2023-07-13 LAB
B-HCG SERPL-ACNC: 1 MIU/ML (ref 0–5)
BASOPHILS # BLD AUTO: 0.04 THOUSANDS/ÂΜL (ref 0–0.1)
BASOPHILS NFR BLD AUTO: 1 % (ref 0–1)
EOSINOPHIL # BLD AUTO: 0.14 THOUSAND/ÂΜL (ref 0–0.61)
EOSINOPHIL NFR BLD AUTO: 2 % (ref 0–6)
ERYTHROCYTE [DISTWIDTH] IN BLOOD BY AUTOMATED COUNT: 13.5 % (ref 11.6–15.1)
HCT VFR BLD AUTO: 39.8 % (ref 34.8–46.1)
HGB BLD-MCNC: 12.7 G/DL (ref 11.5–15.4)
IMM GRANULOCYTES # BLD AUTO: 0.02 THOUSAND/UL (ref 0–0.2)
IMM GRANULOCYTES NFR BLD AUTO: 0 % (ref 0–2)
LYMPHOCYTES # BLD AUTO: 3.56 THOUSANDS/ÂΜL (ref 0.6–4.47)
LYMPHOCYTES NFR BLD AUTO: 50 % (ref 14–44)
MCH RBC QN AUTO: 27.5 PG (ref 26.8–34.3)
MCHC RBC AUTO-ENTMCNC: 31.9 G/DL (ref 31.4–37.4)
MCV RBC AUTO: 86 FL (ref 82–98)
MONOCYTES # BLD AUTO: 0.36 THOUSAND/ÂΜL (ref 0.17–1.22)
MONOCYTES NFR BLD AUTO: 5 % (ref 4–12)
NEUTROPHILS # BLD AUTO: 2.95 THOUSANDS/ÂΜL (ref 1.85–7.62)
NEUTS SEG NFR BLD AUTO: 42 % (ref 43–75)
NRBC BLD AUTO-RTO: 0 /100 WBCS
PLATELET # BLD AUTO: 371 THOUSANDS/UL (ref 149–390)
PMV BLD AUTO: 9 FL (ref 8.9–12.7)
RBC # BLD AUTO: 4.62 MILLION/UL (ref 3.81–5.12)
TSH SERPL DL<=0.05 MIU/L-ACNC: 1.26 UIU/ML (ref 0.45–4.5)
WBC # BLD AUTO: 7.07 THOUSAND/UL (ref 4.31–10.16)

## 2023-07-13 PROCEDURE — 36415 COLL VENOUS BLD VENIPUNCTURE: CPT

## 2023-07-13 PROCEDURE — 85025 COMPLETE CBC W/AUTO DIFF WBC: CPT

## 2023-07-13 PROCEDURE — 76856 US EXAM PELVIC COMPLETE: CPT

## 2023-07-13 PROCEDURE — 84702 CHORIONIC GONADOTROPIN TEST: CPT

## 2023-07-13 PROCEDURE — 76830 TRANSVAGINAL US NON-OB: CPT

## 2023-07-13 PROCEDURE — 84443 ASSAY THYROID STIM HORMONE: CPT

## 2023-08-10 NOTE — TELEPHONE ENCOUNTER
"  Reason for Disposition  • [1] Pregnant 23 or more weeks AND [2] baby moving less today by kick count  (e g , kick count < 5 in 1 hour or < 10 in 2 hours)    Answer Assessment - Initial Assessment Questions  1  FETAL MOVEMENT: \"Has the baby's movement decreased or changed significantly from normal?\" (e g , yes, no; describe)      Decrease movement- last 2 hours 6 kicks    2  AVE: \"What date are you expecting to deliver? \"       6/22    3  PREGNANCY: \"How many weeks pregnant are you?\"       36w4d    4  OTHER SYMPTOMS: \"Do you have any other symptoms? \" (e g , abdominal pain, leaking fluid from vagina, vaginal bleeding, etc )      \"stretching pains today\" some cramping last night    Protocols used: PREGNANCY - DECREASED FETAL MOVEMENT-ADULT-AH    " yes n/a

## 2023-10-11 ENCOUNTER — TELEPHONE (OUTPATIENT)
Age: 38
End: 2023-10-11

## 2023-10-17 NOTE — PATIENT INSTRUCTIONS
Continue to feed on demand with careful attention to positioning  Continue to try to latch without the nipple shield but stop if Merryl Estela becomes frustrated  Continue pumping per your current routine  Continue with several brief periods of Tummy Time for Merryl Estela each day as tolerated  Follow up with Dr Gaudencio Lara as scheduled  Call with questions or concerns  What Is The Reason For Today's Visit?: Skin Lesions

## 2024-06-14 ENCOUNTER — NURSE TRIAGE (OUTPATIENT)
Age: 39
End: 2024-06-14

## 2024-06-14 NOTE — TELEPHONE ENCOUNTER
"Patient states she was prescribed Seasonale OCP by her PCP for heavy painful periods s/p tubal ligation. She started her first pack in early May and experience mild nausea and dizziness. She developed vaginal bleeding about 2 weeks after initiation and bleeding persisted to current time. She states bleeding has been a mild-moderate flow with the exception of 2 days ago when her bleeding was heavy and she needed to empty her menstrual cup 4 times that day. She denies passing clots. Patient verbalizes liking her previous IUD which was removed for conception and if able, would like another IUD inserted. Patient scheduled with Dr. Davis on 6/24 for IUD consult but will be unable to come in person and is requesting a virtual visit. HP in basket message to Dr. Davis and  Michelle Espinoza with request. Made patient aware RN will call back to confirm virtual appointment or reschedule if virtual is not approved. Reviewed bleeding precautions and when to call back or seek emergency care. Patient verbalizes understanding.    Reason for Disposition  • Periods last > 7 days    Answer Assessment - Initial Assessment Questions  1. AMOUNT: \"Describe the bleeding that you are having.\"     - SPOTTING: spotting, or pinkish / brownish mucous discharge; does not fill panti-liner or pad     - MILD:  less than 1 pad / hour; less than patient's usual menstrual bleeding    - MODERATE: 1-2 pads / hour; 1 menstrual cup every 6 hours; small-medium blood clots (e.g., pea, grape, small coin)    - SEVERE: soaking 2 or more pads/hour for 2 or more hours; 1 menstrual cup every 2 hours; bleeding not contained by pads or continuous red blood from vagina; large blood clots (e.g., golf ball, large coin)       Spotting/light flow for the last 2 weeks, stopped OCP 4 days ago, bleeding became heavier 2 days ago - uses menstrual cup and emptied about 4 times per day - unsure if she was passing clots  2. ONSET: \"When did the bleeding begin?\" " "\"Is it continuing now?\"      12 days ago - Started Seasonale OCP first week of May.   3. MENSTRUAL PERIOD: \"When was the last normal menstrual period?\" \"How is this different than your period?\"      Longer than usual period  4. REGULARITY: \"How regular are your periods?\"      Regular   5. ABDOMINAL PAIN: \"Do you have any pain?\" \"How bad is the pain?\"  (e.g., Scale 1-10; mild, moderate, or severe)    - MILD (1-3): doesn't interfere with normal activities, abdomen soft and not tender to touch     - MODERATE (4-7): interferes with normal activities or awakens from sleep, tender to touch     - SEVERE (8-10): excruciating pain, doubled over, unable to do any normal activities       Denies pain or cramping  6. PREGNANCY: \"Could you be pregnant?\" \"Are you sexually active?\" \"Did you recently give birth?\"      S/p Tubal  7. BREASTFEEDING: \"Are you breastfeeding?\"      Denies  8. HORMONES: \"Are you taking any hormone medications, prescription or OTC?\" (e.g., birth control pills, estrogen)      Seasonale - on first month   9. BLOOD THINNERS: \"Do you take any blood thinners?\" (e.g., Coumadin/warfarin, Pradaxa/dabigatran, aspirin)      Denies  10. CAUSE: \"What do you think is causing the bleeding?\" (e.g., recent gyn surgery, recent gyn procedure; known bleeding disorder, cervical cancer, polycystic ovarian disease, fibroids)          Unsure  11. HEMODYNAMIC STATUS: \"Are you weak or feeling lightheaded?\" If Yes, ask: \"Can you stand and walk normally?\"         Fatigued this past week  12. OTHER SYMPTOMS: \"What other symptoms are you having with the bleeding?\" (e.g., passed tissue, vaginal discharge, fever, menstrual-type cramps)        Notes nausea. Denies vaginal discharge, odor, urinary issues.     Started OCP due to severe cramping and heavy bleeding with cycles. Had IUD in the past.    Protocols used: Vaginal Bleeding - Abnormal-ADULT-[place]    "

## 2024-06-24 ENCOUNTER — TELEPHONE (OUTPATIENT)
Age: 39
End: 2024-06-24

## 2024-06-24 ENCOUNTER — TELEMEDICINE (OUTPATIENT)
Age: 39
End: 2024-06-24
Payer: COMMERCIAL

## 2024-06-24 VITALS — WEIGHT: 256 LBS | HEIGHT: 66 IN | BODY MASS INDEX: 41.14 KG/M2

## 2024-06-24 DIAGNOSIS — Z30.09 COUNSELING FOR BIRTH CONTROL, INTRAUTERINE DEVICE: Primary | ICD-10-CM

## 2024-06-24 PROCEDURE — 99212 OFFICE O/P EST SF 10 MIN: CPT | Performed by: OBSTETRICS & GYNECOLOGY

## 2024-06-24 NOTE — PROGRESS NOTES
Virtual Regular Visit    Verification of patient location:    Patient is located at Home in the following state in which I hold an active license [place]

## 2024-06-24 NOTE — TELEPHONE ENCOUNTER
Attempted to reach patient, left brief message requesting patient to call back to check in. No communication consent, left brief voicemail.

## 2024-06-24 NOTE — PROGRESS NOTES
Virtual Regular Visit    Verification of patient location:    Patient is located at {[facility] Virtual Patient Location:[id number]} in the following state in which I hold an active license {[facility] virtual patient location:[id number]}

## 2024-06-24 NOTE — TELEPHONE ENCOUNTER
Called patient to review chart and check in for virtual appt with Dr. Davis at 9:45am. No answer. LMOM to call us back.

## 2024-07-08 ENCOUNTER — TELEPHONE (OUTPATIENT)
Age: 39
End: 2024-07-08

## 2024-07-08 NOTE — TELEPHONE ENCOUNTER
LMOM for patient to call us back to reschedule her appt on 7/24/24. If the patient calls back please offer first available.

## 2024-07-12 NOTE — TELEPHONE ENCOUNTER
Attempted to contact patient to reschedule upcoming appt with Dr. Davis on 7/24. No answer. Will send a Vidyardt message

## 2024-07-25 ENCOUNTER — PROCEDURE VISIT (OUTPATIENT)
Age: 39
End: 2024-07-25
Payer: COMMERCIAL

## 2024-07-25 VITALS
HEIGHT: 66 IN | BODY MASS INDEX: 41.91 KG/M2 | WEIGHT: 260.8 LBS | SYSTOLIC BLOOD PRESSURE: 122 MMHG | DIASTOLIC BLOOD PRESSURE: 76 MMHG

## 2024-07-25 DIAGNOSIS — Z30.430 ENCOUNTER FOR INSERTION OF MIRENA IUD: Primary | ICD-10-CM

## 2024-07-25 DIAGNOSIS — Z32.02 NEGATIVE PREGNANCY TEST: ICD-10-CM

## 2024-07-25 LAB — SL AMB POCT URINE HCG: NEGATIVE

## 2024-07-25 PROCEDURE — 58300 INSERT INTRAUTERINE DEVICE: CPT | Performed by: OBSTETRICS & GYNECOLOGY

## 2024-07-25 PROCEDURE — 81025 URINE PREGNANCY TEST: CPT | Performed by: OBSTETRICS & GYNECOLOGY

## 2024-07-25 RX ORDER — TOPIRAMATE 25 MG/1
25 TABLET ORAL DAILY
COMMUNITY
Start: 2024-07-19 | End: 2024-08-18

## 2024-07-25 RX ORDER — DEXTROAMPHETAMINE SACCHARATE, AMPHETAMINE ASPARTATE, DEXTROAMPHETAMINE SULFATE AND AMPHETAMINE SULFATE 2.5; 2.5; 2.5; 2.5 MG/1; MG/1; MG/1; MG/1
10 TABLET ORAL 2 TIMES DAILY
COMMUNITY
Start: 2024-05-07

## 2024-07-25 NOTE — PROGRESS NOTES
Iud insertions    Performed by: Petty Davis MD  Authorized by: Petty Davis MD    Procedure: IUD insertion    Consent obtained by patient, parent, or legal power of  - including discussion of procedure risks and benefits, patient questions answered, and patient education provided.: yes    Pregnancy risk: reasonably certain the patient is not pregnant    Pregnancy risk comment:  Salpingectomy, neg UPT  Date/Time of Insertion:  7/25/2024 1:04 PM  Pelvic exam performed: yes    Speculum placed in vagina: yes    Cervix cleaned and prepped: yes    Tenaculum/Allis/Ring Forceps applied to cervix: yes    Uterus sound depth (cm):  9  Cervix dilated: no    IUD inserted without complications: yes    IUD type:  Levonorgestrel 20 MCG/DAY  Patient tolerated procedure well: yes    Intended removal date: 10 years

## 2025-02-19 ENCOUNTER — TELEPHONE (OUTPATIENT)
Age: 40
End: 2025-02-19

## 2025-02-19 NOTE — TELEPHONE ENCOUNTER
Patient is calling to inquire about our travel clinic as she will be traveling to [place] of [place] on March 22nd with her two young daughters and they will be staying there for two weeks. I explained the basics of our travel clinic and gave her the prices. Patient will discuss with her  and was encouraged to call us back if she would like to move forward with our clinic.    Thank you.

## 2025-02-21 ENCOUNTER — ANNUAL EXAM (OUTPATIENT)
Age: 40
End: 2025-02-21
Payer: COMMERCIAL

## 2025-02-21 VITALS
HEIGHT: 66 IN | SYSTOLIC BLOOD PRESSURE: 102 MMHG | DIASTOLIC BLOOD PRESSURE: 68 MMHG | WEIGHT: 218 LBS | BODY MASS INDEX: 35.03 KG/M2

## 2025-02-21 DIAGNOSIS — Z01.419 ROUTINE GYNECOLOGICAL EXAMINATION: Primary | ICD-10-CM

## 2025-02-21 PROBLEM — O99.210 OBESITY COMPLICATING PREGNANCY: Status: RESOLVED | Noted: 2020-10-29 | Resolved: 2025-02-21

## 2025-02-21 PROBLEM — R10.2 PELVIC PAIN IN PREGNANCY: Status: RESOLVED | Noted: 2020-11-03 | Resolved: 2025-02-21

## 2025-02-21 PROBLEM — O40.3XX0 POLYHYDRAMNIOS IN THIRD TRIMESTER: Status: RESOLVED | Noted: 2023-05-15 | Resolved: 2025-02-21

## 2025-02-21 PROBLEM — O99.891 BACK PAIN AFFECTING PREGNANCY IN SECOND TRIMESTER: Status: RESOLVED | Noted: 2020-10-06 | Resolved: 2025-02-21

## 2025-02-21 PROBLEM — O34.219 HISTORY OF CESAREAN DELIVERY, ANTEPARTUM: Status: RESOLVED | Noted: 2023-06-05 | Resolved: 2025-02-21

## 2025-02-21 PROBLEM — M54.9 BACK PAIN AFFECTING PREGNANCY IN SECOND TRIMESTER: Status: RESOLVED | Noted: 2020-10-06 | Resolved: 2025-02-21

## 2025-02-21 PROBLEM — O26.899 PELVIC PAIN IN PREGNANCY: Status: RESOLVED | Noted: 2020-11-03 | Resolved: 2025-02-21

## 2025-02-21 PROBLEM — Z34.93 PRENATAL CARE IN THIRD TRIMESTER: Status: RESOLVED | Noted: 2023-04-13 | Resolved: 2025-02-21

## 2025-02-21 PROBLEM — E66.9 OBESITY (BMI 35.0-39.9 WITHOUT COMORBIDITY): Status: ACTIVE | Noted: 2025-02-21

## 2025-02-21 PROBLEM — O09.529 AMA (ADVANCED MATERNAL AGE) MULTIGRAVIDA 35+: Status: RESOLVED | Noted: 2022-11-30 | Resolved: 2025-02-21

## 2025-02-21 PROCEDURE — S0612 ANNUAL GYNECOLOGICAL EXAMINA: HCPCS | Performed by: OBSTETRICS & GYNECOLOGY

## 2025-02-21 RX ORDER — FLUTICASONE PROPIONATE 50 MCG
SPRAY, SUSPENSION (ML) NASAL
COMMUNITY

## 2025-02-21 RX ORDER — AZELASTINE HYDROCHLORIDE 137 UG/1
SPRAY, METERED NASAL
COMMUNITY
Start: 2025-01-27

## 2025-02-21 NOTE — PROGRESS NOTES
Juany Guevara  1985      CC:  Yearly exam    S:  39 y.o. female here for yearly exam. At last visit had Mirena placed for cycle control.  Since that time periods have improved.  Most recent cycle was the best she has had in terms of flow/cramping.     She denies vaginal discharge, itching, pelvic pain.   She has no urinary concerns, does not have incontinence.  No bowel concerns.  No breast concerns.     Sexual activity: She is sexually active without pain, bleeding or dryness.     Contraception: She uses Mirena/tubal for contraception.     Last Pap: 21 - NILM, Neg HPV    We reviewed ASCCP guidelines for Pap testing today.     Family hx of breast cancer: M Cousin  Family hx of ovarian cancer: no  Family hx of colon cancer: no      Current Outpatient Medications:     amphetamine-dextroamphetamine (ADDERALL) 10 mg tablet, Take 10 mg by mouth 2 (two) times a day, Disp: , Rfl:     Azelastine HCl 137 MCG/SPRAY SOLN, SPRAY 1-2 SPRAYS INTO EACH NOSTRIL TWICE A DAY AS DIRECTED, Disp: , Rfl:     fluticasone (FLONASE) 50 mcg/act nasal spray, spray 2 sprays into each nostril every day, Disp: , Rfl:     Levonorgestrel (MIRENA) 20 MCG/DAY IUD, 1 each by Intrauterine Device route Once every 8 years, Disp: , Rfl:     omeprazole (PriLOSEC) 20 mg delayed release capsule, Take 1 capsule (20 mg total) by mouth daily, Disp: 90 capsule, Rfl: 2    topiramate (TOPAMAX) 25 mg tablet, Take 25 mg by mouth daily, Disp: , Rfl:     Patient Active Problem List   Diagnosis    GERD without esophagitis    Esophageal dysphagia    Back pain affecting pregnancy in second trimester    Obesity complicating pregnancy    Pelvic pain in pregnancy    Decreased fetal movement    Upper respiratory tract infection    AMA (advanced maternal age) multigravida 35+    Status post repeat low transverse  section    Prenatal care in third trimester    Polyhydramnios in third trimester    History of  delivery, antepartum    History of  "bilateral salpingectomy    Elevated blood-pressure reading, without diagnosis of hypertension     Past Medical History:   Diagnosis Date    Abnormal Pap smear of cervix     Elevated blood-pressure reading, without diagnosis of hypertension 6/22/2023    Gastritis     HPV (human papilloma virus) infection     Varicella      Family History   Problem Relation Age of Onset    Fibroids Mother         hysterectomy 40's    Osteopenia Mother     Hyperlipidemia Mother     Osteoporosis Mother     Hyperlipidemia Father     Hyperlipidemia Brother     Osteoporosis Maternal Grandmother     Hypertension Maternal Grandmother     Depression Maternal Grandmother     No Known Problems Maternal Grandfather     Hypertension Paternal Grandmother     Osteoporosis Paternal Grandmother     Depression Paternal Grandmother     Stroke Paternal Grandmother     Hyperlipidemia Paternal Grandmother     Diabetes Paternal Grandfather     Hyperlipidemia Paternal Grandfather     No Known Problems Daughter     Breast cancer Maternal Cousin     Colon cancer Neg Hx     Ovarian cancer Neg Hx           Review of Systems   Respiratory: Negative.    Cardiovascular: Negative.    Gastrointestinal: Negative for constipation and diarrhea.     O:  Blood pressure 102/68, height 5' 6\" (1.676 m), weight 98.9 kg (218 lb), last menstrual period 02/07/2025, not currently breastfeeding.    Patient appears well and is not in distress  Breasts are symmetrical without mass, tenderness, nipple discharge, skin changes or adenopathy.   Abdomen is soft and nontender without masses.   External genitals are normal without lesions or rashes.  Urethral meatus and urethra are normal  Bladder is normal to palpation  Vagina is normal without discharge or bleeding.   Cervix is normal without discharge or lesion, IUD strings normal    Uterus is normal, mobile, nontender without palpable mass.  Adnexa are normal, nontender, without palpable mass.     A:  Yearly exam.     P:   Pap & HPV " up to date    Mammo age 42   Colon Cancer Screening age 47   RTO one year for yearly exam or sooner as needed.

## 2025-08-19 ENCOUNTER — OFFICE VISIT (OUTPATIENT)
Dept: INFECTIOUS DISEASES | Facility: CLINIC | Age: 40
End: 2025-08-19

## 2025-08-19 VITALS
TEMPERATURE: 97.5 F | HEART RATE: 90 BPM | OXYGEN SATURATION: 98 % | SYSTOLIC BLOOD PRESSURE: 128 MMHG | WEIGHT: 219 LBS | DIASTOLIC BLOOD PRESSURE: 74 MMHG | BODY MASS INDEX: 35.2 KG/M2 | HEIGHT: 66 IN | RESPIRATION RATE: 18 BRPM

## 2025-08-19 DIAGNOSIS — Z71.84 TRAVEL ADVICE ENCOUNTER: Primary | ICD-10-CM

## 2025-08-19 PROCEDURE — 99401 PREV MED CNSL INDIV APPRX 15: CPT | Performed by: INTERNAL MEDICINE

## 2025-08-19 PROCEDURE — 90474 IMMUNE ADMIN ORAL/NASAL ADDL: CPT

## 2025-08-19 PROCEDURE — 90690 TYPHOID VACCINE ORAL: CPT

## 2025-08-19 RX ORDER — ATOVAQUONE AND PROGUANIL HYDROCHLORIDE 250; 100 MG/1; MG/1
1 TABLET, FILM COATED ORAL
Qty: 15 TABLET | Refills: 1 | Status: SHIPPED | OUTPATIENT
Start: 2025-10-09 | End: 2025-10-24

## (undated) DEVICE — GAUZE SPONGES,16 PLY: Brand: CURITY

## (undated) DEVICE — SUT MONOCRYL 4-0 PS-2 27 IN Y426H

## (undated) DEVICE — ABG MICROSTICKS SAFETY

## (undated) DEVICE — SUT VICRYL 0 CTX 36 IN J978H

## (undated) DEVICE — Device

## (undated) DEVICE — SUT STRATAFIX SPIRAL 4-0 PGA/PCL 30 X 30 CM SXMD2B409

## (undated) DEVICE — 3M™ STERI-STRIP™ REINFORCED ADHESIVE SKIN CLOSURES, R1547, 1/2 IN X 4 IN (12 MM X 100 MM), 6 STRIPS/ENVELOPE: Brand: 3M™ STERI-STRIP™

## (undated) DEVICE — SUT PLAIN 2-0 CTX 27 IN 872H

## (undated) DEVICE — GLOVE INDICATOR PI UNDERGLOVE SZ 7 BLUE

## (undated) DEVICE — TELFA NON-ADHERENT ABSORBENT DRESSING: Brand: TELFA

## (undated) DEVICE — MEDI-VAC YANKAUER SUCTION HANDLE W/STRAIGHT TIP & CONTROL VENT: Brand: CARDINAL HEALTH

## (undated) DEVICE — SWABSTCK, BENZOIN TINCTURE, 1/PK, STRL: Brand: APLICARE

## (undated) DEVICE — SKIN MARKER DUAL TIP WITH RULER CAP, FLEXIBLE RULER AND LABELS: Brand: DEVON

## (undated) DEVICE — CHLORAPREP HI-LITE 26ML ORANGE

## (undated) DEVICE — GLOVE PI ULTRA TOUCH SZ.6.5

## (undated) DEVICE — GLOVE SRG BIOGEL ECLIPSE 7

## (undated) DEVICE — SUT PDS II 0 TP-1 60 IN Z991G

## (undated) DEVICE — SUT CHROMIC 0 CT-1 27 IN 812H

## (undated) DEVICE — ADHESIVE SKIN HIGH VISCOSITY EXOFIN 1ML

## (undated) DEVICE — GLOVE INDICATOR PI UNDERGLOVE SZ 7.5 BLUE

## (undated) DEVICE — ABDOMINAL PAD: Brand: DERMACEA

## (undated) DEVICE — PACK C-SECTION PBDS

## (undated) DEVICE — SUT VICRYL 0 CT-1 36 IN J946H

## (undated) DEVICE — SUT STRATAFIX SPIRAL PDS 2-0 CT-1 45 CM SXPP1B411